# Patient Record
Sex: FEMALE | Race: WHITE | NOT HISPANIC OR LATINO | Employment: UNEMPLOYED | URBAN - METROPOLITAN AREA
[De-identification: names, ages, dates, MRNs, and addresses within clinical notes are randomized per-mention and may not be internally consistent; named-entity substitution may affect disease eponyms.]

---

## 2021-03-09 ENCOUNTER — PATIENT MESSAGE (OUTPATIENT)
Dept: OBSTETRICS AND GYNECOLOGY | Facility: CLINIC | Age: 61
End: 2021-03-09

## 2021-03-09 ENCOUNTER — OFFICE VISIT (OUTPATIENT)
Dept: OBSTETRICS AND GYNECOLOGY | Facility: CLINIC | Age: 61
End: 2021-03-09
Payer: COMMERCIAL

## 2021-03-09 VITALS — WEIGHT: 215.25 LBS | DIASTOLIC BLOOD PRESSURE: 70 MMHG | SYSTOLIC BLOOD PRESSURE: 118 MMHG

## 2021-03-09 DIAGNOSIS — Z12.39 ENCOUNTER FOR SCREENING FOR MALIGNANT NEOPLASM OF BREAST, UNSPECIFIED SCREENING MODALITY: ICD-10-CM

## 2021-03-09 DIAGNOSIS — Z01.419 ENCOUNTER FOR ANNUAL ROUTINE GYNECOLOGICAL EXAMINATION: Primary | ICD-10-CM

## 2021-03-09 PROCEDURE — 99999 PR PBB SHADOW E&M-NEW PATIENT-LVL III: CPT | Mod: PBBFAC,,, | Performed by: NURSE PRACTITIONER

## 2021-03-09 PROCEDURE — 99386 PREV VISIT NEW AGE 40-64: CPT | Mod: S$GLB,,, | Performed by: NURSE PRACTITIONER

## 2021-03-09 PROCEDURE — 1126F PR PAIN SEVERITY QUANTIFIED, NO PAIN PRESENT: ICD-10-PCS | Mod: S$GLB,,, | Performed by: NURSE PRACTITIONER

## 2021-03-09 PROCEDURE — 99386 PR PREVENTIVE VISIT,NEW,40-64: ICD-10-PCS | Mod: S$GLB,,, | Performed by: NURSE PRACTITIONER

## 2021-03-09 PROCEDURE — 99999 PR PBB SHADOW E&M-NEW PATIENT-LVL III: ICD-10-PCS | Mod: PBBFAC,,, | Performed by: NURSE PRACTITIONER

## 2021-03-09 PROCEDURE — 1126F AMNT PAIN NOTED NONE PRSNT: CPT | Mod: S$GLB,,, | Performed by: NURSE PRACTITIONER

## 2021-03-09 RX ORDER — LABETALOL 100 MG/1
TABLET, FILM COATED ORAL
COMMUNITY
Start: 2020-07-15 | End: 2021-10-12

## 2021-03-09 RX ORDER — ALENDRONATE SODIUM 70 MG/1
TABLET ORAL
COMMUNITY
End: 2021-10-12

## 2021-03-09 RX ORDER — HYDROCHLOROTHIAZIDE 25 MG/1
TABLET ORAL
COMMUNITY
Start: 2020-06-10 | End: 2021-10-12 | Stop reason: SDUPTHER

## 2021-03-09 RX ORDER — LOSARTAN POTASSIUM 100 MG/1
TABLET ORAL
COMMUNITY
Start: 2020-07-15 | End: 2022-02-22 | Stop reason: SDUPTHER

## 2021-03-09 RX ORDER — LEVOTHYROXINE SODIUM 200 UG/1
TABLET ORAL
COMMUNITY
Start: 2021-03-01

## 2021-03-09 RX ORDER — VENLAFAXINE HYDROCHLORIDE 150 MG/1
CAPSULE, EXTENDED RELEASE ORAL
COMMUNITY
Start: 2020-08-11 | End: 2022-02-15

## 2021-03-09 RX ORDER — DULOXETIN HYDROCHLORIDE 20 MG/1
20 CAPSULE, DELAYED RELEASE ORAL DAILY
COMMUNITY
Start: 2021-02-27 | End: 2021-10-12

## 2021-03-11 ENCOUNTER — HOSPITAL ENCOUNTER (OUTPATIENT)
Dept: RADIOLOGY | Facility: HOSPITAL | Age: 61
Discharge: HOME | End: 2021-03-11
Attending: NURSE PRACTITIONER
Payer: COMMERCIAL

## 2021-03-11 VITALS — WEIGHT: 215.38 LBS

## 2021-03-11 DIAGNOSIS — Z12.39 ENCOUNTER FOR SCREENING FOR MALIGNANT NEOPLASM OF BREAST, UNSPECIFIED SCREENING MODALITY: ICD-10-CM

## 2021-03-11 PROCEDURE — 77067 SCR MAMMO BI INCL CAD: CPT | Mod: 26,,, | Performed by: RADIOLOGY

## 2021-03-11 PROCEDURE — 77067 MAMMO DIGITAL SCREENING BILAT WITH TOMO: ICD-10-PCS | Mod: 26,,, | Performed by: RADIOLOGY

## 2021-03-11 PROCEDURE — 77063 MAMMO DIGITAL SCREENING BILAT WITH TOMO: ICD-10-PCS | Mod: 26,,, | Performed by: RADIOLOGY

## 2021-03-11 PROCEDURE — 77063 BREAST TOMOSYNTHESIS BI: CPT | Mod: 26,,, | Performed by: RADIOLOGY

## 2021-03-11 PROCEDURE — 77067 SCR MAMMO BI INCL CAD: CPT | Mod: TC,PN

## 2021-10-10 PROBLEM — E03.9 HYPOTHYROIDISM: Status: ACTIVE | Noted: 2021-10-10

## 2021-10-10 PROBLEM — F32.A DEPRESSIVE DISORDER: Status: ACTIVE | Noted: 2020-10-19

## 2021-10-10 PROBLEM — E66.9 OBESITY: Status: ACTIVE | Noted: 2021-10-10

## 2021-10-10 PROBLEM — M81.0 OSTEOPOROSIS: Status: ACTIVE | Noted: 2020-10-19

## 2021-10-10 PROBLEM — M79.7 FIBROMYALGIA: Status: ACTIVE | Noted: 2020-10-19

## 2021-10-10 PROBLEM — I10 HYPERTENSION: Status: ACTIVE | Noted: 2020-10-19

## 2021-10-10 PROBLEM — M19.90 OSTEOARTHRITIS: Status: ACTIVE | Noted: 2020-10-19

## 2021-10-12 ENCOUNTER — OFFICE VISIT (OUTPATIENT)
Dept: PRIMARY CARE CLINIC | Facility: CLINIC | Age: 61
End: 2021-10-12
Payer: COMMERCIAL

## 2021-10-12 VITALS
DIASTOLIC BLOOD PRESSURE: 82 MMHG | OXYGEN SATURATION: 99 % | SYSTOLIC BLOOD PRESSURE: 126 MMHG | HEIGHT: 62 IN | TEMPERATURE: 98 F | BODY MASS INDEX: 40.57 KG/M2 | RESPIRATION RATE: 18 BRPM | WEIGHT: 220.44 LBS | HEART RATE: 92 BPM

## 2021-10-12 DIAGNOSIS — Z11.59 NEED FOR HEPATITIS C SCREENING TEST: ICD-10-CM

## 2021-10-12 DIAGNOSIS — C44.90 SKIN CANCER: ICD-10-CM

## 2021-10-12 DIAGNOSIS — F32.A DEPRESSIVE DISORDER: ICD-10-CM

## 2021-10-12 DIAGNOSIS — M81.0 OSTEOPOROSIS, UNSPECIFIED OSTEOPOROSIS TYPE, UNSPECIFIED PATHOLOGICAL FRACTURE PRESENCE: ICD-10-CM

## 2021-10-12 DIAGNOSIS — E66.01 MORBID OBESITY WITH BMI OF 40.0-44.9, ADULT: ICD-10-CM

## 2021-10-12 DIAGNOSIS — F41.9 ANXIETY: ICD-10-CM

## 2021-10-12 DIAGNOSIS — G60.9 IDIOPATHIC PERIPHERAL NEUROPATHY: ICD-10-CM

## 2021-10-12 DIAGNOSIS — C73 THYROID CANCER: ICD-10-CM

## 2021-10-12 DIAGNOSIS — M79.7 FIBROMYALGIA: ICD-10-CM

## 2021-10-12 DIAGNOSIS — E89.0 POSTOPERATIVE HYPOTHYROIDISM: ICD-10-CM

## 2021-10-12 DIAGNOSIS — N18.30 STAGE 3 CHRONIC KIDNEY DISEASE, UNSPECIFIED WHETHER STAGE 3A OR 3B CKD: ICD-10-CM

## 2021-10-12 DIAGNOSIS — I10 HYPERTENSION, UNSPECIFIED TYPE: Primary | ICD-10-CM

## 2021-10-12 DIAGNOSIS — Z11.4 ENCOUNTER FOR SCREENING FOR HIV: ICD-10-CM

## 2021-10-12 PROBLEM — E55.9 VITAMIN D DEFICIENCY: Status: ACTIVE | Noted: 2021-04-29

## 2021-10-12 PROBLEM — R80.0 ISOLATED NON-NEPHROTIC PROTEINURIA: Status: ACTIVE | Noted: 2021-04-29

## 2021-10-12 PROBLEM — D63.1 ANEMIA IN CHRONIC KIDNEY DISEASE: Status: ACTIVE | Noted: 2021-04-29

## 2021-10-12 PROBLEM — E78.00 HYPERCHOLESTEROLEMIA: Status: ACTIVE | Noted: 2021-10-07

## 2021-10-12 PROBLEM — N18.9 ANEMIA IN CHRONIC KIDNEY DISEASE: Status: ACTIVE | Noted: 2021-04-29

## 2021-10-12 PROCEDURE — 99204 OFFICE O/P NEW MOD 45 MIN: CPT | Mod: S$GLB,,, | Performed by: INTERNAL MEDICINE

## 2021-10-12 PROCEDURE — 3074F PR MOST RECENT SYSTOLIC BLOOD PRESSURE < 130 MM HG: ICD-10-PCS | Mod: CPTII,S$GLB,, | Performed by: INTERNAL MEDICINE

## 2021-10-12 PROCEDURE — 1160F PR REVIEW ALL MEDS BY PRESCRIBER/CLIN PHARMACIST DOCUMENTED: ICD-10-PCS | Mod: CPTII,S$GLB,, | Performed by: INTERNAL MEDICINE

## 2021-10-12 PROCEDURE — 99999 PR PBB SHADOW E&M-EST. PATIENT-LVL IV: CPT | Mod: PBBFAC,,, | Performed by: INTERNAL MEDICINE

## 2021-10-12 PROCEDURE — 4010F PR ACE/ARB THEARPY RXD/TAKEN: ICD-10-PCS | Mod: CPTII,S$GLB,, | Performed by: INTERNAL MEDICINE

## 2021-10-12 PROCEDURE — 4010F ACE/ARB THERAPY RXD/TAKEN: CPT | Mod: CPTII,S$GLB,, | Performed by: INTERNAL MEDICINE

## 2021-10-12 PROCEDURE — 3079F PR MOST RECENT DIASTOLIC BLOOD PRESSURE 80-89 MM HG: ICD-10-PCS | Mod: CPTII,S$GLB,, | Performed by: INTERNAL MEDICINE

## 2021-10-12 PROCEDURE — 3074F SYST BP LT 130 MM HG: CPT | Mod: CPTII,S$GLB,, | Performed by: INTERNAL MEDICINE

## 2021-10-12 PROCEDURE — 3008F BODY MASS INDEX DOCD: CPT | Mod: CPTII,S$GLB,, | Performed by: INTERNAL MEDICINE

## 2021-10-12 PROCEDURE — 99204 PR OFFICE/OUTPT VISIT, NEW, LEVL IV, 45-59 MIN: ICD-10-PCS | Mod: S$GLB,,, | Performed by: INTERNAL MEDICINE

## 2021-10-12 PROCEDURE — 99999 PR PBB SHADOW E&M-EST. PATIENT-LVL IV: ICD-10-PCS | Mod: PBBFAC,,, | Performed by: INTERNAL MEDICINE

## 2021-10-12 PROCEDURE — 3079F DIAST BP 80-89 MM HG: CPT | Mod: CPTII,S$GLB,, | Performed by: INTERNAL MEDICINE

## 2021-10-12 PROCEDURE — 1159F MED LIST DOCD IN RCRD: CPT | Mod: CPTII,S$GLB,, | Performed by: INTERNAL MEDICINE

## 2021-10-12 PROCEDURE — 1160F RVW MEDS BY RX/DR IN RCRD: CPT | Mod: CPTII,S$GLB,, | Performed by: INTERNAL MEDICINE

## 2021-10-12 PROCEDURE — 1159F PR MEDICATION LIST DOCUMENTED IN MEDICAL RECORD: ICD-10-PCS | Mod: CPTII,S$GLB,, | Performed by: INTERNAL MEDICINE

## 2021-10-12 PROCEDURE — 3008F PR BODY MASS INDEX (BMI) DOCUMENTED: ICD-10-PCS | Mod: CPTII,S$GLB,, | Performed by: INTERNAL MEDICINE

## 2021-10-12 RX ORDER — HYDROCHLOROTHIAZIDE 12.5 MG/1
12.5 TABLET ORAL DAILY
Qty: 90 TABLET | Refills: 1 | Status: SHIPPED | OUTPATIENT
Start: 2021-10-12 | End: 2022-02-22 | Stop reason: SDUPTHER

## 2021-10-12 RX ORDER — ACETAMINOPHEN AND CODEINE PHOSPHATE 300; 30 MG/1; MG/1
1 TABLET ORAL
COMMUNITY
Start: 2021-06-17 | End: 2021-10-12

## 2021-10-12 RX ORDER — VENLAFAXINE HYDROCHLORIDE 37.5 MG/1
CAPSULE, EXTENDED RELEASE ORAL
Qty: 30 CAPSULE | Refills: 1 | Status: SHIPPED | OUTPATIENT
Start: 2021-10-12 | End: 2021-12-06

## 2021-10-12 RX ORDER — DICLOXACILLIN SODIUM 500 MG/1
500 CAPSULE ORAL 2 TIMES DAILY
COMMUNITY
Start: 2021-06-18 | End: 2021-10-12

## 2021-10-12 RX ORDER — PREGABALIN 25 MG/1
25 CAPSULE ORAL
COMMUNITY
Start: 2021-10-08 | End: 2023-03-01

## 2021-10-12 RX ORDER — METOPROLOL SUCCINATE 50 MG/1
50 TABLET, EXTENDED RELEASE ORAL DAILY
COMMUNITY
Start: 2021-09-22 | End: 2022-02-22 | Stop reason: SDUPTHER

## 2021-11-23 ENCOUNTER — OFFICE VISIT (OUTPATIENT)
Dept: URGENT CARE | Facility: CLINIC | Age: 61
End: 2021-11-23
Payer: COMMERCIAL

## 2021-11-23 VITALS
TEMPERATURE: 98 F | SYSTOLIC BLOOD PRESSURE: 156 MMHG | OXYGEN SATURATION: 98 % | RESPIRATION RATE: 18 BRPM | WEIGHT: 215 LBS | BODY MASS INDEX: 39.56 KG/M2 | HEART RATE: 74 BPM | HEIGHT: 62 IN | DIASTOLIC BLOOD PRESSURE: 101 MMHG

## 2021-11-23 DIAGNOSIS — C73 THYROID CANCER: ICD-10-CM

## 2021-11-23 DIAGNOSIS — N18.31 STAGE 3A CHRONIC KIDNEY DISEASE: ICD-10-CM

## 2021-11-23 DIAGNOSIS — E66.9 OBESITY, UNSPECIFIED CLASSIFICATION, UNSPECIFIED OBESITY TYPE, UNSPECIFIED WHETHER SERIOUS COMORBIDITY PRESENT: ICD-10-CM

## 2021-11-23 DIAGNOSIS — S49.92XA INJURY OF LEFT SHOULDER, INITIAL ENCOUNTER: Primary | ICD-10-CM

## 2021-11-23 PROCEDURE — 4010F ACE/ARB THERAPY RXD/TAKEN: CPT | Mod: CPTII,S$GLB,, | Performed by: SURGERY

## 2021-11-23 PROCEDURE — 73030 X-RAY EXAM OF SHOULDER: CPT | Mod: LT,S$GLB,, | Performed by: RADIOLOGY

## 2021-11-23 PROCEDURE — 99214 PR OFFICE/OUTPT VISIT, EST, LEVL IV, 30-39 MIN: ICD-10-PCS | Mod: 25,S$GLB,, | Performed by: SURGERY

## 2021-11-23 PROCEDURE — 4010F PR ACE/ARB THEARPY RXD/TAKEN: ICD-10-PCS | Mod: CPTII,S$GLB,, | Performed by: SURGERY

## 2021-11-23 PROCEDURE — 99214 OFFICE O/P EST MOD 30 MIN: CPT | Mod: 25,S$GLB,, | Performed by: SURGERY

## 2021-11-23 PROCEDURE — 96372 THER/PROPH/DIAG INJ SC/IM: CPT | Mod: S$GLB,,, | Performed by: SURGERY

## 2021-11-23 PROCEDURE — 96372 PR INJECTION,THERAP/PROPH/DIAG2ST, IM OR SUBCUT: ICD-10-PCS | Mod: S$GLB,,, | Performed by: SURGERY

## 2021-11-23 PROCEDURE — 73030 XR SHOULDER COMPLETE 2 OR MORE VIEWS LEFT: ICD-10-PCS | Mod: LT,S$GLB,, | Performed by: RADIOLOGY

## 2021-11-23 RX ORDER — BETAMETHASONE SODIUM PHOSPHATE AND BETAMETHASONE ACETATE 3; 3 MG/ML; MG/ML
6 INJECTION, SUSPENSION INTRA-ARTICULAR; INTRALESIONAL; INTRAMUSCULAR; SOFT TISSUE
Status: COMPLETED | OUTPATIENT
Start: 2021-11-23 | End: 2021-11-23

## 2021-11-23 RX ORDER — TIZANIDINE 4 MG/1
4 TABLET ORAL 3 TIMES DAILY PRN
Qty: 20 TABLET | Refills: 0 | Status: SHIPPED | OUTPATIENT
Start: 2021-11-23 | End: 2022-02-22

## 2021-11-23 RX ADMIN — BETAMETHASONE SODIUM PHOSPHATE AND BETAMETHASONE ACETATE 6 MG: 3; 3 INJECTION, SUSPENSION INTRA-ARTICULAR; INTRALESIONAL; INTRAMUSCULAR; SOFT TISSUE at 08:11

## 2021-11-29 ENCOUNTER — OFFICE VISIT (OUTPATIENT)
Dept: SPORTS MEDICINE | Facility: CLINIC | Age: 61
End: 2021-11-29
Payer: COMMERCIAL

## 2021-11-29 VITALS
HEIGHT: 62 IN | WEIGHT: 222.88 LBS | DIASTOLIC BLOOD PRESSURE: 103 MMHG | HEART RATE: 96 BPM | BODY MASS INDEX: 41.02 KG/M2 | SYSTOLIC BLOOD PRESSURE: 153 MMHG

## 2021-11-29 DIAGNOSIS — M75.22 BICEPS TENDINITIS OF LEFT SHOULDER: ICD-10-CM
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
## 2021-11-29 DIAGNOSIS — M25.512 ACUTE PAIN OF LEFT SHOULDER: Primary | ICD-10-CM

## 2021-11-29 PROCEDURE — 99204 PR OFFICE/OUTPT VISIT, NEW, LEVL IV, 45-59 MIN: ICD-10-PCS | Mod: S$GLB,,, | Performed by: PHYSICIAN ASSISTANT

## 2021-11-29 PROCEDURE — 99204 OFFICE O/P NEW MOD 45 MIN: CPT | Mod: S$GLB,,, | Performed by: PHYSICIAN ASSISTANT

## 2021-11-29 PROCEDURE — 4010F PR ACE/ARB THEARPY RXD/TAKEN: ICD-10-PCS | Mod: CPTII,S$GLB,, | Performed by: PHYSICIAN ASSISTANT

## 2021-11-29 PROCEDURE — 99999 PR PBB SHADOW E&M-EST. PATIENT-LVL III: CPT | Mod: PBBFAC,,, | Performed by: PHYSICIAN ASSISTANT

## 2021-11-29 PROCEDURE — 4010F ACE/ARB THERAPY RXD/TAKEN: CPT | Mod: CPTII,S$GLB,, | Performed by: PHYSICIAN ASSISTANT

## 2021-11-29 PROCEDURE — 99999 PR PBB SHADOW E&M-EST. PATIENT-LVL III: ICD-10-PCS | Mod: PBBFAC,,, | Performed by: PHYSICIAN ASSISTANT

## 2021-11-29 RX ORDER — METHOCARBAMOL 500 MG/1
500 TABLET, FILM COATED ORAL 4 TIMES DAILY
Qty: 40 TABLET | Refills: 0 | Status: SHIPPED | OUTPATIENT
Start: 2021-11-29 | End: 2021-12-09

## 2021-12-08 LAB
HCV AB S/CO SERPL IA: 0.01
HCV AB SERPL QL IA: NORMAL
HIV 1+2 AB+HIV1 P24 AG SERPL QL IA: NORMAL

## 2021-12-13 ENCOUNTER — PATIENT MESSAGE (OUTPATIENT)
Dept: PRIMARY CARE CLINIC | Facility: CLINIC | Age: 61
End: 2021-12-13
Payer: COMMERCIAL

## 2021-12-13 RX ORDER — DAPAGLIFLOZIN 10 MG/1
10 TABLET, FILM COATED ORAL DAILY
Qty: 1 TABLET | Refills: 0
Start: 2021-12-13 | End: 2022-02-22

## 2021-12-17 ENCOUNTER — OFFICE VISIT (OUTPATIENT)
Dept: SPORTS MEDICINE | Facility: CLINIC | Age: 61
End: 2021-12-17
Payer: COMMERCIAL

## 2021-12-17 VITALS
SYSTOLIC BLOOD PRESSURE: 164 MMHG | DIASTOLIC BLOOD PRESSURE: 94 MMHG | WEIGHT: 222.25 LBS | HEIGHT: 62 IN | HEART RATE: 96 BPM | BODY MASS INDEX: 40.9 KG/M2

## 2021-12-17 DIAGNOSIS — M25.512 ACUTE PAIN OF LEFT SHOULDER: Primary | ICD-10-CM

## 2021-12-17 PROCEDURE — 4010F ACE/ARB THERAPY RXD/TAKEN: CPT | Mod: CPTII,S$GLB,, | Performed by: PHYSICIAN ASSISTANT

## 2021-12-17 PROCEDURE — 99999 PR PBB SHADOW E&M-EST. PATIENT-LVL III: ICD-10-PCS | Mod: PBBFAC,,, | Performed by: PHYSICIAN ASSISTANT

## 2021-12-17 PROCEDURE — 99213 PR OFFICE/OUTPT VISIT, EST, LEVL III, 20-29 MIN: ICD-10-PCS | Mod: S$GLB,,, | Performed by: PHYSICIAN ASSISTANT

## 2021-12-17 PROCEDURE — 99999 PR PBB SHADOW E&M-EST. PATIENT-LVL III: CPT | Mod: PBBFAC,,, | Performed by: PHYSICIAN ASSISTANT

## 2021-12-17 PROCEDURE — 99213 OFFICE O/P EST LOW 20 MIN: CPT | Mod: S$GLB,,, | Performed by: PHYSICIAN ASSISTANT

## 2021-12-17 PROCEDURE — 4010F PR ACE/ARB THEARPY RXD/TAKEN: ICD-10-PCS | Mod: CPTII,S$GLB,, | Performed by: PHYSICIAN ASSISTANT

## 2021-12-21 ENCOUNTER — PATIENT OUTREACH (OUTPATIENT)
Dept: ADMINISTRATIVE | Facility: HOSPITAL | Age: 61
End: 2021-12-21
Payer: COMMERCIAL

## 2022-01-07 ENCOUNTER — PATIENT MESSAGE (OUTPATIENT)
Dept: PRIMARY CARE CLINIC | Facility: CLINIC | Age: 62
End: 2022-01-07
Payer: COMMERCIAL

## 2022-01-10 NOTE — TELEPHONE ENCOUNTER
Pt was in contact with a covid positive person on 1/6/22. She states she started with sore throat and a cough on Saturday 1/8/22 and received a negative covid test.

## 2022-01-25 ENCOUNTER — PATIENT MESSAGE (OUTPATIENT)
Dept: PRIMARY CARE CLINIC | Facility: CLINIC | Age: 62
End: 2022-01-25
Payer: COMMERCIAL

## 2022-01-25 DIAGNOSIS — R05.9 COUGH: Primary | ICD-10-CM

## 2022-01-25 RX ORDER — CODEINE PHOSPHATE AND GUAIFENESIN 10; 100 MG/5ML; MG/5ML
5 SOLUTION ORAL EVERY 8 HOURS PRN
Qty: 105 ML | Refills: 0 | Status: SHIPPED | OUTPATIENT
Start: 2022-01-25 | End: 2022-02-04

## 2022-01-25 NOTE — TELEPHONE ENCOUNTER
Will send her cough syrup with codeine.  The Rx says she can take it up to Q8hrs but she may want to take just at bedtime as it may make her tired and a little loopy due to the codeine.  If this does not help or she worsens she should let us know and she may need a CXR and examination.    Dr. ROGERS

## 2022-02-08 ENCOUNTER — PATIENT OUTREACH (OUTPATIENT)
Dept: ADMINISTRATIVE | Facility: HOSPITAL | Age: 62
End: 2022-02-08
Payer: COMMERCIAL

## 2022-02-08 NOTE — PROGRESS NOTES
Patient due for the following    Pneumococcal Vaccines (Age 0-64) (2 of 4 - PPSV23)    Mammogram       Received c-scope records.  Scanned to media.  updated    Mammogram due 3/2022      Immunizations: reviewed and updated  Care Everywhere: triggered  Care Teams:  Up to date  Outreach: none needed

## 2022-02-20 NOTE — PROGRESS NOTES
[facility] Note    Chief Complaint      Chief Complaint   Patient presents with    Follow-up       History of Present Illness      Bree Gonzales is a 61 y.o.  F with HTN, Hypothyroidism, Depression, Anxiety, FM, OA, and Osteoporosis, Anemia of CKD III, HLD, Vit D def, FM, Peripheral neuropathy, Morbid Obesity, and fam h/o Breast Ca presents to fu chronic issues. Last visit - 10/12/21.    H/o Melanoma - Fu by Derm. Doing well. Had resection of lesion to Left cheek 6/17/21     HTN - Pt controlled on HCTZ 12.5 mg/d, Toprol 50 mg/d, and Losartan 100 mg/d     Hypothyroidism - Pt on Brand Name Synthroid 200 mcg/d. Pt is s/p Total Thyroidectomy due to thyroid Ca. Fu by Dr. Iam Haile.      Depression - Pt on Venlafaxine  mg +37.5 mg/d.     Anxiety -  Pt on Venlafaxine  mg +37.5 mg/d. Her mom has dementia.      Anemia of CKD III - Fu by Dr. Sarmiento.        CKD III -  Fu by Dr. Sarmiento.  Pt could not afford Farxiga 10 mg/d.      HLD - Pt not on pharmacotherapy.        Vit D def - Pt is on a MVI and Ca+D.      OA - Fu by Rheum, Dr. Grimes.      Osteoporosis - Pt on Prolia. Last inj was in Dec.     FM - Pt on Lyrica 25 mg QAm and 2 tabs QHS.       Peripheral neuropathy - Foot pain - She c/o numbness and throbbing at night.   Cymbalta 20 mg/d no help. Pt is on Lyrica 25 mg QAm and 2 tabs QHS.     Morbid Obesity - BMI -41.65  Up 7 lb. She is in the process of moving. Pt reports a recent normal HA1c. Pt tries to limit carbs. She does not exercise.  Rec goal 150 min/wk.     fam h/o Breast Ca - Pt is BRCA neg.      H/o Malignant Hyperthermia -s/p anesthesia    Lab review: 12/6/21 - BUN/Cr - 33/1.10; H/H - 12/37.4.     HCM - Flu - 10/12/21;  Tdap - 5/2/14;  PCV 13 - none;  PVX 23 - none;  Shingrix - 8/26/20 and #2 - 11/11/21;  Zostavax - 2/24/15; COVID - 19 Vaccine (Pfiizer)  #1 2/26/21; #2 3/19/21; #3 - 10/26/21; MGM - 3/11/21 - repeat 1 yr;  DEXA - 11/2020 - +Osteoporosis;  PAP - ?; Hep C  Screen - 12/6/21 - neg. ;  HIV Screen - 12/6/21 - neg. ; C-scope - '11- due;   Prev PCP - Dr. Duckworth;  Rheum - Dr. Grimes; Renal - Dr. Sarmiento; Derm - [facility]; Hand Sx - Dr. Quiroz; Ob/GYN - NP - Quiana Sotomayor; Endo - Dr. Vitale; Ophtho - Dr. Kennedy; GI - Dr. Bansal       Patient Care Team:  Shana Jade MD as PCP - General (Internal Medicine)  Amber Sotomayor MA as Care Coordinator  Praneeth Bansal MD as Consulting Physician (Gastroenterology)     Health Maintenance:  Immunization History   Administered Date(s) Administered    COVID-19, MRNA, LN-S, PF (Pfizer) (Purple Cap) 02/26/2021, 03/19/2021, 10/26/2021    Influenza - Quadrivalent 10/03/2019    Influenza - Quadrivalent - MDCK - PF 08/26/2020    Influenza - Quadrivalent - PF *Preferred* (6 months and older) 10/03/2018, 10/12/2021    Pneumococcal Polysaccharide - 23 Valent 01/25/2017    Tdap 04/30/2014, 05/02/2014    Zoster 02/24/2015    Zoster Recombinant 08/26/2020, 11/11/2021      Health Maintenance   Topic Date Due    Mammogram  03/11/2022    TETANUS VACCINE  05/02/2024    Lipid Panel  12/29/2025    Hepatitis C Screening  Completed        Past Medical History:  Past Medical History:   Diagnosis Date    CKD (chronic kidney disease), stage III 10/12/2021    Family history of breast cancer     Fibromyalgia     Hypertension     Hypothyroidism     Malignant hyperthermia due to anesthesia     Osteoporosis     Skin cancer 10/12/2021    Thyroid cancer 10/12/2021       Past Surgical History:   has a past surgical history that includes Cholecystectomy (05/1981); Total thyroidectomy (03/2002); Skin cancer excision; Dilation and curettage of uterus; Trigger finger release (Right, 11/2020); and Cataract extraction, bilateral.    Family History:  family history includes Breast cancer in her maternal grandmother; Breast cancer (age of onset: 51) in her sister; Breast cancer (age of onset: 74) in her mother; Dementia in her mother.      Social History:  Social History     Tobacco Use    Smoking status: Never Smoker    Smokeless tobacco: Never Used   Substance Use Topics    Alcohol use: Never    Drug use: Never       Review of Systems   Constitutional: Negative for chills, diaphoresis and fever.   HENT: Positive for nasal congestion and postnasal drip.    Eyes: Negative for visual disturbance.   Respiratory: Negative for chest tightness and shortness of breath.    Cardiovascular: Negative for chest pain and palpitations.   Gastrointestinal: Negative for abdominal pain, constipation, diarrhea, nausea and vomiting.   Genitourinary: Negative for dysuria and frequency.   Musculoskeletal: Positive for arthralgias and myalgias.        Low back - took tylenol this Am.  She has been packing and moving boxes.    Neurological: Negative for dizziness and headaches.   Psychiatric/Behavioral: Negative for dysphoric mood. The patient is not nervous/anxious.         Stress with moving        Medications:    Current Outpatient Medications:     cetirizine 10 mg Cap, Take by mouth., Disp: , Rfl:     denosumab (PROLIA) 60 mg/mL Syrg, Inject 1 mL (60 mg total) into the skin every 6 (six) months., Disp: , Rfl:     pregabalin (LYRICA) 25 MG capsule, Take 25 mg by mouth 3 (three) times daily with meals., Disp: , Rfl:     SYNTHROID 200 mcg tablet, TAKE 1 TABLET BY MOUTH ON MONDAY THRU SATURDAY AND 2 TABLETS ON SUNDAY, Disp: , Rfl:     hydroCHLOROthiazide (HYDRODIURIL) 12.5 MG Tab, Take 1 tablet (12.5 mg total) by mouth once daily., Disp: 90 tablet, Rfl: 1    losartan (COZAAR) 100 MG tablet, 1 po daily, Disp: 90 tablet, Rfl: 1    metoprolol succinate (TOPROL-XL) 50 MG 24 hr tablet, Take 1 tablet (50 mg total) by mouth once daily., Disp: 90 tablet, Rfl: 1    venlafaxine (EFFEXOR-XR) 150 MG Cp24, Take 1 capsule (150 mg total) by mouth every evening., Disp: 30 capsule, Rfl: 5    venlafaxine (EFFEXOR-XR) 37.5 MG 24 hr capsule, TAKE 1 CAPSULE BY MOUTH EVERY DAY  "ALONG WITH 150MG CAPSULES, Disp: 30 capsule, Rfl: 5     Allergies:  Review of patient's allergies indicates:   Allergen Reactions    Nsaids (non-steroidal anti-inflammatory drug)     Neosporin (neomycin-polymyx) Rash       Physical Exam      Vital Signs  Temp: 98 °F (36.7 °C)  Pulse: 89  Resp: 18  SpO2: 97 %  BP: 127/64  BP Location: Left arm  Patient Position: Sitting  Pain Score:   4  Height and Weight  Height: 5' 2" (157.5 cm)  Weight: 103.3 kg (227 lb 11.8 oz)  BSA (Calculated - sq m): 2.13 sq meters  BMI (Calculated): 41.6  Weight in (lb) to have BMI = 25: 136.4      Patient Position: Sitting      Physical Exam  Vitals reviewed.   Constitutional:       General: She is not in acute distress.     Appearance: Normal appearance. She is not ill-appearing, toxic-appearing or diaphoretic.   HENT:      Head: Normocephalic and atraumatic.      Ears:      Comments: RT serous otitis  Eyes:      Extraocular Movements: Extraocular movements intact.      Conjunctiva/sclera: Conjunctivae normal.      Pupils: Pupils are equal, round, and reactive to light.   Neck:      Vascular: No carotid bruit.   Cardiovascular:      Rate and Rhythm: Normal rate and regular rhythm.      Pulses: Normal pulses.      Heart sounds: Normal heart sounds. No murmur heard.  Pulmonary:      Effort: No respiratory distress.      Breath sounds: Normal breath sounds. No wheezing.   Abdominal:      General: Bowel sounds are normal. There is no distension.      Palpations: Abdomen is soft.      Tenderness: There is no abdominal tenderness. There is no guarding or rebound.   Musculoskeletal:         General: Normal range of motion.   Skin:     General: Skin is warm and dry.   Neurological:      General: No focal deficit present.      Mental Status: She is alert and oriented to person, place, and time.   Psychiatric:         Mood and Affect: Mood normal.         Behavior: Behavior normal.          Laboratory:        Recent Labs   Lab 12/06/21  0922 "   Hepatitis C Ab NON-REACTIVE       Assessment/Plan     Bree Gonzales is a 61 y.o.female with:    Normal physical exam, routine  - Performed today.  Will check Basic labs.  RTC in 1 yr for fu or sooner if needed    Hypertension, unspecified type  -     hydroCHLOROthiazide (HYDRODIURIL) 12.5 MG Tab; Take 1 tablet (12.5 mg total) by mouth once daily.  Dispense: 90 tablet; Refill: 1  -     losartan (COZAAR) 100 MG tablet; 1 po daily  Dispense: 90 tablet; Refill: 1  -     metoprolol succinate (TOPROL-XL) 50 MG 24 hr tablet; Take 1 tablet (50 mg total) by mouth once daily.  Dispense: 90 tablet; Refill: 1  - Controlled.  Cont current.     Postoperative hypothyroidism  - Controlled.  Cont current. Managed by Dr. Vitale.     Stage 3 chronic kidney disease, unspecified whether stage 3a or 3b CKD  - Stable.  Cont current regimen.    Depressive disorder  -     venlafaxine (EFFEXOR-XR) 150 MG Cp24; Take 1 capsule (150 mg total) by mouth every evening.  Dispense: 30 capsule; Refill: 5  -     venlafaxine (EFFEXOR-XR) 37.5 MG 24 hr capsule; TAKE 1 CAPSULE BY MOUTH EVERY DAY ALONG WITH 150MG CAPSULES  Dispense: 30 capsule; Refill: 5  - Stable.  Cont current regimen.    Anxiety  -     venlafaxine (EFFEXOR-XR) 37.5 MG 24 hr capsule; TAKE 1 CAPSULE BY MOUTH EVERY DAY ALONG WITH 150MG CAPSULES  Dispense: 30 capsule; Refill: 5  - Stable.  Cont current regimen.    Fibromyalgia  - Stable.  Cont current regimen.    Osteoporosis, unspecified osteoporosis type, unspecified pathological fracture presence  - Stable. Pt on Prolia. Last inj was in Dec.    Morbid obesity with BMI of 40.0-44.9, adult  - Rec diet and exercise as discussed for wt loss.      Hypercholesterolemia  -     Lipid Panel; Future; Expected date: 02/22/2022  -Repeat FLP. Pt not on pharmacotherapy.     Acute rhinitis  - Rec autoinsufflation exercises prn.  Flonase 2 SEN/d, Claritin daily prn. Thsi will likely help with postnasal drip, congestion, and serous otitis.           Chronic conditions status updated as per HPI.  Other than changes above, cont current medications and maintain follow up with specialists.  Follow up in about 6 months (around 8/22/2022) for fu chronic issues or sooner if needed.      Shana Jade MD  [facility]

## 2022-02-22 ENCOUNTER — OFFICE VISIT (OUTPATIENT)
Dept: PRIMARY CARE CLINIC | Facility: CLINIC | Age: 62
End: 2022-02-22
Payer: COMMERCIAL

## 2022-02-22 VITALS
DIASTOLIC BLOOD PRESSURE: 64 MMHG | RESPIRATION RATE: 18 BRPM | OXYGEN SATURATION: 97 % | BODY MASS INDEX: 41.91 KG/M2 | HEIGHT: 62 IN | TEMPERATURE: 98 F | SYSTOLIC BLOOD PRESSURE: 127 MMHG | WEIGHT: 227.75 LBS | HEART RATE: 89 BPM

## 2022-02-22 DIAGNOSIS — N18.30 STAGE 3 CHRONIC KIDNEY DISEASE, UNSPECIFIED WHETHER STAGE 3A OR 3B CKD: ICD-10-CM

## 2022-02-22 DIAGNOSIS — E66.01 MORBID OBESITY WITH BMI OF 40.0-44.9, ADULT: ICD-10-CM

## 2022-02-22 DIAGNOSIS — M81.0 OSTEOPOROSIS, UNSPECIFIED OSTEOPOROSIS TYPE, UNSPECIFIED PATHOLOGICAL FRACTURE PRESENCE: ICD-10-CM

## 2022-02-22 DIAGNOSIS — F41.9 ANXIETY: ICD-10-CM

## 2022-02-22 DIAGNOSIS — F32.A DEPRESSIVE DISORDER: ICD-10-CM

## 2022-02-22 DIAGNOSIS — M79.7 FIBROMYALGIA: ICD-10-CM

## 2022-02-22 DIAGNOSIS — J00 ACUTE RHINITIS: ICD-10-CM

## 2022-02-22 DIAGNOSIS — E78.00 HYPERCHOLESTEROLEMIA: ICD-10-CM

## 2022-02-22 DIAGNOSIS — Z00.00 NORMAL PHYSICAL EXAM, ROUTINE: Primary | ICD-10-CM

## 2022-02-22 DIAGNOSIS — I10 HYPERTENSION, UNSPECIFIED TYPE: ICD-10-CM

## 2022-02-22 DIAGNOSIS — E89.0 POSTOPERATIVE HYPOTHYROIDISM: ICD-10-CM

## 2022-02-22 PROCEDURE — 4010F ACE/ARB THERAPY RXD/TAKEN: CPT | Mod: CPTII,S$GLB,, | Performed by: INTERNAL MEDICINE

## 2022-02-22 PROCEDURE — 99396 PREV VISIT EST AGE 40-64: CPT | Mod: S$GLB,,, | Performed by: INTERNAL MEDICINE

## 2022-02-22 PROCEDURE — 1159F MED LIST DOCD IN RCRD: CPT | Mod: CPTII,S$GLB,, | Performed by: INTERNAL MEDICINE

## 2022-02-22 PROCEDURE — 3008F PR BODY MASS INDEX (BMI) DOCUMENTED: ICD-10-PCS | Mod: CPTII,S$GLB,, | Performed by: INTERNAL MEDICINE

## 2022-02-22 PROCEDURE — 1159F PR MEDICATION LIST DOCUMENTED IN MEDICAL RECORD: ICD-10-PCS | Mod: CPTII,S$GLB,, | Performed by: INTERNAL MEDICINE

## 2022-02-22 PROCEDURE — 3078F DIAST BP <80 MM HG: CPT | Mod: CPTII,S$GLB,, | Performed by: INTERNAL MEDICINE

## 2022-02-22 PROCEDURE — 4010F PR ACE/ARB THEARPY RXD/TAKEN: ICD-10-PCS | Mod: CPTII,S$GLB,, | Performed by: INTERNAL MEDICINE

## 2022-02-22 PROCEDURE — 99999 PR PBB SHADOW E&M-EST. PATIENT-LVL IV: CPT | Mod: PBBFAC,,, | Performed by: INTERNAL MEDICINE

## 2022-02-22 PROCEDURE — 99396 PR PREVENTIVE VISIT,EST,40-64: ICD-10-PCS | Mod: S$GLB,,, | Performed by: INTERNAL MEDICINE

## 2022-02-22 PROCEDURE — 3008F BODY MASS INDEX DOCD: CPT | Mod: CPTII,S$GLB,, | Performed by: INTERNAL MEDICINE

## 2022-02-22 PROCEDURE — 3078F PR MOST RECENT DIASTOLIC BLOOD PRESSURE < 80 MM HG: ICD-10-PCS | Mod: CPTII,S$GLB,, | Performed by: INTERNAL MEDICINE

## 2022-02-22 PROCEDURE — 3074F SYST BP LT 130 MM HG: CPT | Mod: CPTII,S$GLB,, | Performed by: INTERNAL MEDICINE

## 2022-02-22 PROCEDURE — 99999 PR PBB SHADOW E&M-EST. PATIENT-LVL IV: ICD-10-PCS | Mod: PBBFAC,,, | Performed by: INTERNAL MEDICINE

## 2022-02-22 PROCEDURE — 3074F PR MOST RECENT SYSTOLIC BLOOD PRESSURE < 130 MM HG: ICD-10-PCS | Mod: CPTII,S$GLB,, | Performed by: INTERNAL MEDICINE

## 2022-02-22 RX ORDER — HYDROCHLOROTHIAZIDE 12.5 MG/1
12.5 TABLET ORAL DAILY
Qty: 90 TABLET | Refills: 1 | Status: SHIPPED | OUTPATIENT
Start: 2022-02-22 | End: 2022-07-26

## 2022-02-22 RX ORDER — METOPROLOL SUCCINATE 50 MG/1
50 TABLET, EXTENDED RELEASE ORAL DAILY
Qty: 90 TABLET | Refills: 1 | Status: SHIPPED | OUTPATIENT
Start: 2022-02-22 | End: 2022-08-23 | Stop reason: SDUPTHER

## 2022-02-22 RX ORDER — VENLAFAXINE HYDROCHLORIDE 37.5 MG/1
CAPSULE, EXTENDED RELEASE ORAL
Qty: 30 CAPSULE | Refills: 5 | Status: SHIPPED | OUTPATIENT
Start: 2022-02-22 | End: 2022-08-23 | Stop reason: SDUPTHER

## 2022-02-22 RX ORDER — VENLAFAXINE HYDROCHLORIDE 150 MG/1
150 CAPSULE, EXTENDED RELEASE ORAL NIGHTLY
Qty: 30 CAPSULE | Refills: 5 | Status: SHIPPED | OUTPATIENT
Start: 2022-02-22 | End: 2022-08-23 | Stop reason: SDUPTHER

## 2022-02-22 RX ORDER — LOSARTAN POTASSIUM 100 MG/1
TABLET ORAL
Qty: 90 TABLET | Refills: 1 | Status: SHIPPED | OUTPATIENT
Start: 2022-02-22 | End: 2022-08-23 | Stop reason: SDUPTHER

## 2022-02-23 ENCOUNTER — PATIENT MESSAGE (OUTPATIENT)
Dept: PRIMARY CARE CLINIC | Facility: CLINIC | Age: 62
End: 2022-02-23
Payer: COMMERCIAL

## 2022-02-23 LAB
CHOLEST SERPL-MCNC: 240 MG/DL
CHOLEST/HDLC SERPL: 4.2 (CALC)
HDLC SERPL-MCNC: 57 MG/DL
LDLC SERPL CALC-MCNC: 148 MG/DL (CALC)
NONHDLC SERPL-MCNC: 183 MG/DL (CALC)
TRIGL SERPL-MCNC: 210 MG/DL

## 2022-02-23 NOTE — PROGRESS NOTES
I sent pt a my chart message -  I reviewed your labs.  Your cholesterol was high. The 10-year ASCVD risk score (Risk of having a cardiovascular event within 10 yrs) is: 4.9%.  Given that you have chronic kidney disease Stage III I recommend you follow a low cholesterol diet and exercise.  You can visit the American heart association website at heart.org for further info on a low cholesterol diet.   I recommend you consider starting a statin.  I would like to start you on Atorvastatin 20 mg daily. There are potential Side effects including muscle pain,  muscle weakness, and hyperglycemia.  Please let me know if you are willing to start this so I can send it to the pharm for you . You should call with any concerns.  Will cont to monitor. No further recommendations at this time.    Dr. ROGERS

## 2022-02-24 RX ORDER — ATORVASTATIN CALCIUM 20 MG/1
20 TABLET, FILM COATED ORAL DAILY
Qty: 90 TABLET | Refills: 1 | Status: SHIPPED | OUTPATIENT
Start: 2022-02-24 | End: 2022-07-28

## 2022-02-24 NOTE — TELEPHONE ENCOUNTER
No new care gaps identified.  Powered by [email or web address] by [employer]. Reference number: [id number].   2/24/2022 8:03:59 AM CST

## 2022-04-18 ENCOUNTER — OFFICE VISIT (OUTPATIENT)
Dept: OBSTETRICS AND GYNECOLOGY | Facility: CLINIC | Age: 62
End: 2022-04-18
Payer: COMMERCIAL

## 2022-04-18 ENCOUNTER — HOSPITAL ENCOUNTER (OUTPATIENT)
Dept: RADIOLOGY | Facility: HOSPITAL | Age: 62
Discharge: HOME | End: 2022-04-18
Attending: OBSTETRICS & GYNECOLOGY
Payer: COMMERCIAL

## 2022-04-18 VITALS
SYSTOLIC BLOOD PRESSURE: 120 MMHG | HEIGHT: 62 IN | WEIGHT: 230.19 LBS | BODY MASS INDEX: 42.36 KG/M2 | DIASTOLIC BLOOD PRESSURE: 78 MMHG

## 2022-04-18 VITALS — HEIGHT: 62 IN | WEIGHT: 229 LBS | BODY MASS INDEX: 42.14 KG/M2

## 2022-04-18 DIAGNOSIS — Z12.31 SCREENING MAMMOGRAM FOR BREAST CANCER: ICD-10-CM

## 2022-04-18 DIAGNOSIS — Z01.419 ENCOUNTER FOR WELL WOMAN EXAM WITH ROUTINE GYNECOLOGICAL EXAM: Primary | ICD-10-CM

## 2022-04-18 PROCEDURE — 77067 SCR MAMMO BI INCL CAD: CPT | Mod: 26,,, | Performed by: RADIOLOGY

## 2022-04-18 PROCEDURE — 4010F PR ACE/ARB THEARPY RXD/TAKEN: ICD-10-PCS | Mod: CPTII,S$GLB,, | Performed by: OBSTETRICS & GYNECOLOGY

## 2022-04-18 PROCEDURE — 77063 BREAST TOMOSYNTHESIS BI: CPT | Mod: TC,PN

## 2022-04-18 PROCEDURE — 3078F DIAST BP <80 MM HG: CPT | Mod: CPTII,S$GLB,, | Performed by: OBSTETRICS & GYNECOLOGY

## 2022-04-18 PROCEDURE — 3008F PR BODY MASS INDEX (BMI) DOCUMENTED: ICD-10-PCS | Mod: CPTII,S$GLB,, | Performed by: OBSTETRICS & GYNECOLOGY

## 2022-04-18 PROCEDURE — 3078F PR MOST RECENT DIASTOLIC BLOOD PRESSURE < 80 MM HG: ICD-10-PCS | Mod: CPTII,S$GLB,, | Performed by: OBSTETRICS & GYNECOLOGY

## 2022-04-18 PROCEDURE — 99396 PR PREVENTIVE VISIT,EST,40-64: ICD-10-PCS | Mod: S$GLB,,, | Performed by: OBSTETRICS & GYNECOLOGY

## 2022-04-18 PROCEDURE — 1159F PR MEDICATION LIST DOCUMENTED IN MEDICAL RECORD: ICD-10-PCS | Mod: CPTII,S$GLB,, | Performed by: OBSTETRICS & GYNECOLOGY

## 2022-04-18 PROCEDURE — 77067 MAMMO DIGITAL SCREENING BILAT WITH TOMO: ICD-10-PCS | Mod: 26,,, | Performed by: RADIOLOGY

## 2022-04-18 PROCEDURE — 3008F BODY MASS INDEX DOCD: CPT | Mod: CPTII,S$GLB,, | Performed by: OBSTETRICS & GYNECOLOGY

## 2022-04-18 PROCEDURE — 1159F MED LIST DOCD IN RCRD: CPT | Mod: CPTII,S$GLB,, | Performed by: OBSTETRICS & GYNECOLOGY

## 2022-04-18 PROCEDURE — 3074F SYST BP LT 130 MM HG: CPT | Mod: CPTII,S$GLB,, | Performed by: OBSTETRICS & GYNECOLOGY

## 2022-04-18 PROCEDURE — 4010F ACE/ARB THERAPY RXD/TAKEN: CPT | Mod: CPTII,S$GLB,, | Performed by: OBSTETRICS & GYNECOLOGY

## 2022-04-18 PROCEDURE — 99999 PR PBB SHADOW E&M-EST. PATIENT-LVL III: CPT | Mod: PBBFAC,,, | Performed by: OBSTETRICS & GYNECOLOGY

## 2022-04-18 PROCEDURE — 77063 BREAST TOMOSYNTHESIS BI: CPT | Mod: 26,,, | Performed by: RADIOLOGY

## 2022-04-18 PROCEDURE — 3074F PR MOST RECENT SYSTOLIC BLOOD PRESSURE < 130 MM HG: ICD-10-PCS | Mod: CPTII,S$GLB,, | Performed by: OBSTETRICS & GYNECOLOGY

## 2022-04-18 PROCEDURE — 77067 SCR MAMMO BI INCL CAD: CPT | Mod: TC,PN

## 2022-04-18 PROCEDURE — 99396 PREV VISIT EST AGE 40-64: CPT | Mod: S$GLB,,, | Performed by: OBSTETRICS & GYNECOLOGY

## 2022-04-18 PROCEDURE — 99999 PR PBB SHADOW E&M-EST. PATIENT-LVL III: ICD-10-PCS | Mod: PBBFAC,,, | Performed by: OBSTETRICS & GYNECOLOGY

## 2022-04-18 PROCEDURE — 77063 MAMMO DIGITAL SCREENING BILAT WITH TOMO: ICD-10-PCS | Mod: 26,,, | Performed by: RADIOLOGY

## 2022-04-18 RX ORDER — FERROUS SULFATE, DRIED 160(50) MG
1 TABLET, EXTENDED RELEASE ORAL
COMMUNITY
End: 2024-03-18

## 2022-04-18 RX ORDER — CEFDINIR 300 MG/1
CAPSULE ORAL
COMMUNITY
Start: 2022-01-09 | End: 2022-08-23

## 2022-04-18 RX ORDER — ZOSTER VACCINE RECOMBINANT, ADJUVANTED 50 MCG/0.5
KIT INTRAMUSCULAR
COMMUNITY
Start: 2021-11-11 | End: 2022-08-23

## 2022-04-18 NOTE — PROGRESS NOTES
CC: Well woman exam    SUBJECTIVE:   Bree Gonzales is a 61 y.o. female  (SVDx 2) here for annual routine Pap and checkup. No LMP recorded. Patient is postmenopausal..  She has no unusual complaints.      Pt is postmenopausal and is not on HRT. She denies hot flashes or night sweats. She denies postmenopausal bleeding.   Pt is not sexually active.    History of abnormal pap: Yes - h/o cryotherapy many years ago  Last Pap: , NILM- no HPV done  Last MMG: Yes - 3/11/2021  Last Colonoscopy:  Yes, scheduled for July with Dr. Bansal  Dexa: last year, osteoporosis, on fosamax. Followed by Dr. Rico     Family history of breast cancer in mom and sister. Mom was 74 and sister was 48.  Also maternal grandmother  Pt is negative for BRCA 1&2    Pt with hx of thyroid cancer s/p total thyroidectomy.  Followed by Dr. Vitale    Pt prior pt of Dr. Rojas.     Past Medical History:   Diagnosis Date    CKD (chronic kidney disease), stage III 10/12/2021    Family history of breast cancer     Fibromyalgia     Hypertension     Hypothyroidism     Malignant hyperthermia due to anesthesia     Osteoporosis     Skin cancer 10/12/2021    Thyroid cancer 10/12/2021     Past Surgical History:   Procedure Laterality Date    CATARACT EXTRACTION, BILATERAL      CHOLECYSTECTOMY  1981    DILATION AND CURETTAGE OF UTERUS      SKIN CANCER EXCISION      TOTAL THYROIDECTOMY  2002    TRIGGER FINGER RELEASE Right 2020     Social History     Socioeconomic History    Marital status:    Tobacco Use    Smoking status: Never Smoker    Smokeless tobacco: Never Used   Substance and Sexual Activity    Alcohol use: Never    Drug use: Never    Sexual activity: Yes     Partners: Male     Comment:      Family History   Problem Relation Age of Onset    Breast cancer Mother 74    Dementia Mother     Breast cancer Sister 51    Breast cancer Maternal Grandmother     Colon cancer Neg Hx     Ovarian  "cancer Neg Hx      OB History        2    Para   2    Term   2            AB        Living           SAB        IAB        Ectopic        Multiple        Live Births                     /78   Ht 5' 2" (1.575 m)   Wt 104.4 kg (230 lb 2.6 oz)   BMI 42.10 kg/m²     ROS:  GENERAL: Denies weight gain or weight loss. Feeling well overall.   SKIN: Denies rash or lesions.   HEAD: Denies head injury or headache.   NODES: Denies enlarged lymph nodes.   CHEST: Denies chest pain or shortness of breath.   CARDIOVASCULAR: Denies palpitations or left sided chest pain.   ABDOMEN: No abdominal pain, constipation, diarrhea, nausea, vomiting or rectal bleeding.   URINARY: No frequency, dysuria, hematuria, or burning on urination.  REPRODUCTIVE: See HPI.   BREASTS: The patient performs breast self-examination and denies pain, lumps, or nipple discharge.   HEMATOLOGIC: No easy bruisability or excessive bleeding.   MUSCULOSKELETAL: Denies joint pain or swelling.   NEUROLOGIC: Denies syncope or weakness.   PSYCHIATRIC: Denies depression, anxiety or mood swings.    PHYSICAL EXAM:  APPEARANCE: Well nourished, well developed, in no acute distress.  AFFECT: WNL, alert and oriented x 3  SKIN: No acne or hirsutism  NECK: Neck symmetric without masses or thyromegaly  NODES: No inguinal, cervical, axillary, or femoral lymph node enlargement  CHEST: Good respiratory effect  ABDOMEN: Soft.  No tenderness or masses.  No hepatosplenomegaly.  No hernias.  BREASTS: Symmetrical, no skin changes or visible lesions.  No palpable masses, nipple discharge bilaterally.  PELVIC: Normal external genitalia without lesions.  Normal hair distribution.  Adequate perineal body, normal urethral meatus.  Vagina mildly atrophic without lesions or discharge.  Cervix pink, without lesions, discharge or tenderness.  No significant cystocele or rectocele.  Bimanual exam shows uterus to be normal size, regular, mobile and nontender.  Adnexa without " masses or tenderness.    RECTAL: Rectovaginal exam confirms above with normal sphincter tone, no masses.  EXTREMITIES: No edema.    ASSESSMENT AND PLAN:    ICD-10-CM ICD-9-CM    1. Encounter for well woman exam with routine gynecological exam  Z01.419 V72.31    2. Screening mammogram for breast cancer  Z12.31 V76.12 Mammo Digital Screening Bilat w/ Dong         Bree was seen today for well woman.    Diagnoses and all orders for this visit:    Encounter for well woman exam with routine gynecological exam  - pap smear up to date.  Will do with cotesting next years 2023  - MMG ordered  - Cscope scheduled  - DEXA up to date    Screening mammogram for breast cancer  -     Mammo Digital Screening Bilat w/ Dong; Future      Orders Placed This Encounter   Procedures    Mammo Digital Screening Bilat w/ Odng       Follow up in about 1 year (around 4/18/2023) for annual.    Zara Beckett MD  Obstetrics & Gynecology

## 2022-05-30 ENCOUNTER — PATIENT OUTREACH (OUTPATIENT)
Dept: ADMINISTRATIVE | Facility: HOSPITAL | Age: 62
End: 2022-05-30
Payer: COMMERCIAL

## 2022-05-30 NOTE — PROGRESS NOTES
Patient due for the following    Pneumococcal Vaccines (Age 0-64) (2 - PCV)    COVID-19 Vaccine (4 - Booster for Pfizer series)    Colorectal Cancer Screening       [facility]    Immunizations: reviewed and updated  Care Everywhere: triggered  Care Teams: up to date  Outreach: none needed

## 2022-05-30 NOTE — LETTER
AUTHORIZATION FOR RELEASE OF   CONFIDENTIAL INFORMATION    Dear Dr. Bansal,    We are seeing Bree Gonzales, date of birth 1960, in the clinic at [facility]. Shana Jade MD is the patient's PCP. Bree Gonzales has an outstanding lab/procedure at the time we reviewed her chart. In order to help keep her health information updated, she has authorized us to request the following medical record(s):        (  )  MAMMOGRAM                                      ( X )  COLONOSCOPY      (  )  PAP SMEAR                                          (  )  OUTSIDE LAB RESULTS     (  )  DEXA SCAN                                          (  )  EYE EXAM            (  )  FOOT EXAM                                          (  )  ENTIRE RECORD     (  )  OUTSIDE IMMUNIZATIONS                 (  )  _______________         Please fax records to [facility], Nicole Giambrone, MD, [phone]     If you have any questions, please contact Amber at [phone].           Patient Name: Bree Gonzales  : 1960  Patient Phone #: [phone]

## 2022-06-07 LAB — CRC RECOMMENDATION EXT: NORMAL

## 2022-06-08 ENCOUNTER — PATIENT OUTREACH (OUTPATIENT)
Dept: ADMINISTRATIVE | Facility: HOSPITAL | Age: 62
End: 2022-06-08
Payer: COMMERCIAL

## 2022-06-08 NOTE — PROGRESS NOTES
Patient due for the following    Pneumococcal Vaccines (Age 0-64) (2 - PCV)    COVID-19 Vaccine (4 - Booster for Pfizer series)      Received c-scope records.  Scanned to media.  updated    Immunizations: reviewed and updated  Care Everywhere: triggered  Care Teams: up to date  Outreach: none needed

## 2022-06-14 ENCOUNTER — OFFICE VISIT (OUTPATIENT)
Dept: PRIMARY CARE CLINIC | Facility: CLINIC | Age: 62
End: 2022-06-14
Payer: COMMERCIAL

## 2022-06-14 ENCOUNTER — PATIENT MESSAGE (OUTPATIENT)
Dept: PRIMARY CARE CLINIC | Facility: CLINIC | Age: 62
End: 2022-06-14

## 2022-06-14 ENCOUNTER — TELEPHONE (OUTPATIENT)
Dept: PRIMARY CARE CLINIC | Facility: CLINIC | Age: 62
End: 2022-06-14
Payer: COMMERCIAL

## 2022-06-14 VITALS
RESPIRATION RATE: 18 BRPM | HEART RATE: 90 BPM | TEMPERATURE: 99 F | WEIGHT: 238.56 LBS | DIASTOLIC BLOOD PRESSURE: 78 MMHG | HEIGHT: 62 IN | SYSTOLIC BLOOD PRESSURE: 122 MMHG | OXYGEN SATURATION: 98 % | BODY MASS INDEX: 43.9 KG/M2

## 2022-06-14 DIAGNOSIS — R09.81 SINUS CONGESTION: ICD-10-CM

## 2022-06-14 DIAGNOSIS — H92.03 EAR PAIN, BILATERAL: Primary | ICD-10-CM

## 2022-06-14 DIAGNOSIS — J30.2 SEASONAL ALLERGIES: ICD-10-CM

## 2022-06-14 PROCEDURE — 99999 PR PBB SHADOW E&M-EST. PATIENT-LVL V: CPT | Mod: PBBFAC,,, | Performed by: NURSE PRACTITIONER

## 2022-06-14 PROCEDURE — 99213 PR OFFICE/OUTPT VISIT, EST, LEVL III, 20-29 MIN: ICD-10-PCS | Mod: S$GLB,,, | Performed by: NURSE PRACTITIONER

## 2022-06-14 PROCEDURE — 1160F PR REVIEW ALL MEDS BY PRESCRIBER/CLIN PHARMACIST DOCUMENTED: ICD-10-PCS | Mod: CPTII,S$GLB,, | Performed by: NURSE PRACTITIONER

## 2022-06-14 PROCEDURE — 3078F DIAST BP <80 MM HG: CPT | Mod: CPTII,S$GLB,, | Performed by: NURSE PRACTITIONER

## 2022-06-14 PROCEDURE — 99999 PR PBB SHADOW E&M-EST. PATIENT-LVL V: ICD-10-PCS | Mod: PBBFAC,,, | Performed by: NURSE PRACTITIONER

## 2022-06-14 PROCEDURE — 4010F PR ACE/ARB THEARPY RXD/TAKEN: ICD-10-PCS | Mod: CPTII,S$GLB,, | Performed by: NURSE PRACTITIONER

## 2022-06-14 PROCEDURE — 1160F RVW MEDS BY RX/DR IN RCRD: CPT | Mod: CPTII,S$GLB,, | Performed by: NURSE PRACTITIONER

## 2022-06-14 PROCEDURE — 99213 OFFICE O/P EST LOW 20 MIN: CPT | Mod: S$GLB,,, | Performed by: NURSE PRACTITIONER

## 2022-06-14 PROCEDURE — 3008F PR BODY MASS INDEX (BMI) DOCUMENTED: ICD-10-PCS | Mod: CPTII,S$GLB,, | Performed by: NURSE PRACTITIONER

## 2022-06-14 PROCEDURE — 3008F BODY MASS INDEX DOCD: CPT | Mod: CPTII,S$GLB,, | Performed by: NURSE PRACTITIONER

## 2022-06-14 PROCEDURE — 3074F PR MOST RECENT SYSTOLIC BLOOD PRESSURE < 130 MM HG: ICD-10-PCS | Mod: CPTII,S$GLB,, | Performed by: NURSE PRACTITIONER

## 2022-06-14 PROCEDURE — 3074F SYST BP LT 130 MM HG: CPT | Mod: CPTII,S$GLB,, | Performed by: NURSE PRACTITIONER

## 2022-06-14 PROCEDURE — 1159F MED LIST DOCD IN RCRD: CPT | Mod: CPTII,S$GLB,, | Performed by: NURSE PRACTITIONER

## 2022-06-14 PROCEDURE — 1159F PR MEDICATION LIST DOCUMENTED IN MEDICAL RECORD: ICD-10-PCS | Mod: CPTII,S$GLB,, | Performed by: NURSE PRACTITIONER

## 2022-06-14 PROCEDURE — 3078F PR MOST RECENT DIASTOLIC BLOOD PRESSURE < 80 MM HG: ICD-10-PCS | Mod: CPTII,S$GLB,, | Performed by: NURSE PRACTITIONER

## 2022-06-14 PROCEDURE — 4010F ACE/ARB THERAPY RXD/TAKEN: CPT | Mod: CPTII,S$GLB,, | Performed by: NURSE PRACTITIONER

## 2022-06-14 RX ORDER — PREDNISONE 20 MG/1
20 TABLET ORAL DAILY
Qty: 55 TABLET | Refills: 0 | Status: SHIPPED | OUTPATIENT
Start: 2022-06-14 | End: 2022-06-19

## 2022-06-14 NOTE — TELEPHONE ENCOUNTER
----- Message from Rosalinda Bangura sent at 6/14/2022  8:53 AM CDT -----  Contact: Pt [phone]  Pt called and stated that she seen you a few months ago and she was experiencing pain in one ear now it is in both ears. She stated that she has an appointment for 10:30 if she does not answer.     Thank  you       [id number] [place] - [address]  [address]  [place] [address]  Phone: [phone] Fax: [phone]

## 2022-06-14 NOTE — PROGRESS NOTES
[facility] Note    Chief Complaint      Chief Complaint   Patient presents with    Otalgia       History of Present Illness      Bree Gonzales is a 61 y.o. female who presents today for   Chief Complaint   Patient presents with    Otalgia   ear pain/congestion that has been ongoing for past 2 months and for sinus congestion. She denies any SOB, chest pain, N/V, unintentional weight loss, loss of appetite, fatigue, diarrhea, constipation. She is active daily and remains independent with ADL's.    Problem List Items Addressed This Visit    None     Visit Diagnoses     Ear pain, bilateral    -  Primary    Relevant Medications    predniSONE (DELTASONE) 20 MG tablet    Sinus congestion        Relevant Medications    predniSONE (DELTASONE) 20 MG tablet    Seasonal allergies              Review of Systems   Constitutional: Negative.    HENT: Positive for congestion, ear pain and sinus pain.    Eyes: Negative.    Respiratory: Negative.    Cardiovascular: Negative.    Gastrointestinal: Negative.    Genitourinary: Negative.    Musculoskeletal: Negative.    Skin: Negative.    Neurological: Negative.    Endo/Heme/Allergies: Negative.    Psychiatric/Behavioral: Negative.         Past Medical History:  Past Medical History:   Diagnosis Date    BRCA1 negative     BRCA2 negative     CKD (chronic kidney disease), stage III 10/12/2021    Family history of breast cancer     Fibromyalgia     Hypertension     Hypothyroidism     Malignant hyperthermia due to anesthesia     Osteoporosis     Skin cancer 10/12/2021    Thyroid cancer 10/12/2021       Past Surgical History:  Past Surgical History:   Procedure Laterality Date    CATARACT EXTRACTION, BILATERAL      CHOLECYSTECTOMY  05/1981    DILATION AND CURETTAGE OF UTERUS      SKIN CANCER EXCISION      TOTAL THYROIDECTOMY  03/2002    TRIGGER FINGER RELEASE Right 11/2020       Family History:  family history includes Breast cancer in her maternal  grandmother; Breast cancer (age of onset: 51) in her sister; Breast cancer (age of onset: 74) in her mother; Dementia in her mother.   Family history was reviewed with patient.    Social History:  Social History     Socioeconomic History    Marital status:    Tobacco Use    Smoking status: Never Smoker    Smokeless tobacco: Never Used   Substance and Sexual Activity    Alcohol use: Never    Drug use: Never    Sexual activity: Yes     Partners: Male     Comment:          Medications:  Outpatient Encounter Medications as of 6/14/2022   Medication Sig Dispense Refill    atorvastatin (LIPITOR) 20 MG tablet Take 1 tablet (20 mg total) by mouth once daily. 90 tablet 1    calcium-vitamin D3 (OS-MICH 500 + D3) 500 mg-5 mcg (200 unit) per tablet Take 1 tablet by mouth.      cefdinir (OMNICEF) 300 MG capsule TAKE ONE CAPSULE BY MOUTH EVERY 12 HOURS FOR 7 DAYS      cetirizine 10 mg Cap Take by mouth.      denosumab (PROLIA) 60 mg/mL Syrg Inject 1 mL (60 mg total) into the skin every 6 (six) months.      hydroCHLOROthiazide (HYDRODIURIL) 12.5 MG Tab Take 1 tablet (12.5 mg total) by mouth once daily. 90 tablet 1    losartan (COZAAR) 100 MG tablet 1 po daily 90 tablet 1    metoprolol succinate (TOPROL-XL) 50 MG 24 hr tablet Take 1 tablet (50 mg total) by mouth once daily. 90 tablet 1    pregabalin (LYRICA) 25 MG capsule Take 25 mg by mouth 3 (three) times daily with meals.      SHINGRIX, PF, 50 mcg/0.5 mL injection       SYNTHROID 200 mcg tablet TAKE 1 TABLET BY MOUTH ON MONDAY THRU SATURDAY AND 2 TABLETS ON SUNDAY      venlafaxine (EFFEXOR-XR) 150 MG Cp24 Take 1 capsule (150 mg total) by mouth every evening. 30 capsule 5    venlafaxine (EFFEXOR-XR) 37.5 MG 24 hr capsule TAKE 1 CAPSULE BY MOUTH EVERY DAY ALONG WITH 150MG CAPSULES 30 capsule 5    predniSONE (DELTASONE) 20 MG tablet Take 1 tablet (20 mg total) by mouth once daily. for 5 days 55 tablet 0     No facility-administered encounter  "medications on file as of 6/14/2022.       Allergies:  Review of patient's allergies indicates:   Allergen Reactions    Nsaids (non-steroidal anti-inflammatory drug)     Neosporin (neomycin-polymyx) Rash       Health Maintenance:  Health Maintenance   Topic Date Due    Mammogram  04/18/2023    TETANUS VACCINE  05/02/2024    Lipid Panel  02/22/2027    Hepatitis C Screening  Completed     Health Maintenance Topics with due status: Not Due       Topic Last Completion Date    TETANUS VACCINE 05/02/2014    Cervical Cancer Screening 08/13/2020    Lipid Panel 02/22/2022    Mammogram 04/18/2022    Colorectal Cancer Screening 06/07/2022       Physical Exam      Vital Signs  Temp: 98.6 °F (37 °C)  Pulse: 90  Resp: 18  SpO2: 98 %  BP: 122/78  Pain Score:   4  Pain Loc: Ear  Height and Weight  Height: 5' 2" (157.5 cm)  Weight: 108.2 kg (238 lb 8.6 oz)  BSA (Calculated - sq m): 2.18 sq meters  BMI (Calculated): 43.6  Weight in (lb) to have BMI = 25: 136.4]    Physical Exam  Constitutional:       Appearance: Normal appearance. She is normal weight.   HENT:      Head: Normocephalic and atraumatic.      Right Ear: Tympanic membrane, ear canal and external ear normal.      Left Ear: Tympanic membrane, ear canal and external ear normal.      Nose: Congestion present.      Mouth/Throat:      Mouth: Mucous membranes are moist.      Pharynx: Oropharynx is clear.   Eyes:      Extraocular Movements: Extraocular movements intact.      Conjunctiva/sclera: Conjunctivae normal.      Pupils: Pupils are equal, round, and reactive to light.   Cardiovascular:      Rate and Rhythm: Normal rate and regular rhythm.      Pulses: Normal pulses.      Heart sounds: Normal heart sounds.   Pulmonary:      Effort: Pulmonary effort is normal.      Breath sounds: Normal breath sounds.   Abdominal:      General: Abdomen is flat. Bowel sounds are normal.      Palpations: Abdomen is soft.   Musculoskeletal:         General: Normal range of motion.      " Cervical back: Normal range of motion and neck supple.   Skin:     General: Skin is warm and dry.      Capillary Refill: Capillary refill takes less than 2 seconds.   Neurological:      General: No focal deficit present.      Mental Status: She is alert and oriented to person, place, and time. Mental status is at baseline.   Psychiatric:         Mood and Affect: Mood normal.         Behavior: Behavior normal.         Thought Content: Thought content normal.         Judgment: Judgment normal.          Laboratory:  CBC:      CMP:        Invalid input(s): CREATININ  URINALYSIS:       LIPIDS:  Recent Labs   Lab 02/22/22  1009   HDL 57   Cholesterol 240 H   Triglycerides 210 H   LDL Cholesterol 148 H   HDL/Cholesterol Ratio 4.2   Non HDL Chol. (LDL+VLDL) 183 H     TSH:      A1C:        Radiology:        Assessment/Plan     Bree Gonzales is a 61 y.o.female with:    Ear pain, bilateral  -     predniSONE (DELTASONE) 20 MG tablet; Take 1 tablet (20 mg total) by mouth once daily. for 5 days  Dispense: 55 tablet; Refill: 0    Sinus congestion  -     predniSONE (DELTASONE) 20 MG tablet; Take 1 tablet (20 mg total) by mouth once daily. for 5 days  Dispense: 55 tablet; Refill: 0    Seasonal allergies    1) Ear pain/congestion:  - Prednisone 20 mg by mouth daily x 5 days    2) Sinus congestion:  - Saline nasal spray (Ocean Spray) to each nostril as needed. Can buy generic    3) Seasonal allergies:  - Continue cetirizine 10 mg by mouth every night    - Contact clinic if symptoms worsen    As above, continue current medications and maintain follow up with specialists.  Return to clinic as needed.    I spent 20 minutes on the day of this encounter for preparing, evaluating, examining, treating, and discussing plan of care with this patient.  Greater than 50% of this time was spent face to face with patient.  All questions were answered to patient's satisfaction.        Karen L Spencer, NP-C [facility]

## 2022-06-14 NOTE — PATIENT INSTRUCTIONS
1) Ear pain/congestion:  - Prednisone 20 mg by mouth daily x 5 days    2) Sinus congestion:  - Saline nasal spray (Ocean Spray) to each nostril as needed. Can buy generic    3) Seasonal allergies:  - Continue cetirizine 10 mg by mouth every night    - Contact clinic if symptoms worsen

## 2022-06-14 NOTE — TELEPHONE ENCOUNTER
lvm requesting pt contact the office. She will need an appt to be evaluated and have someone look into her ears

## 2022-08-22 NOTE — PROGRESS NOTES
[facility] Note    Chief Complaint      Chief Complaint   Patient presents with    Follow-up       History of Present Illness      Bree Gonzales is a 62 y.o.   F with HTN, Hypothyroidism, Depression, Anxiety, FM, OA, and Osteoporosis, Anemia of CKD III, HLD, Vit D def, FM, Peripheral neuropathy, Morbid Obesity, and fam h/o Breast Ca presents to fu chronic issues. Last visit - 2/22/22.    H/o Melanoma - Fu by Derm. Doing well. Had resection of lesion to Left cheek 6/17/21. Stable.      HTN - Pt controlled on HCTZ 12.5 mg/d, Toprol 50 mg/d, and Losartan 100 mg/d     Hypothyroidism - Pt on Brand Name Synthroid 200 mcg/d. Pt is s/p Total Thyroidectomy due to thyroid Ca. Fu by Dr. Iam Haile.      Depression - Pt on Venlafaxine  mg +37.5 mg/d. She recently moved and cares for her elderly Mom. Son recently bought a home and has anew GF and got a new job at [employer] and will be moving closer to home in 6 mos. Stable. No further crying spells.      Anxiety -  Pt on Venlafaxine  mg +37.5 mg/d. Her mom has dementia. Stable       Anemia of CKD III - Fu by Dr. Sarmiento.        CKD III -  Fu by Dr. Sarmiento.  Pt could not afford Farxiga 10 mg/d because insurance would not cover it.      HLD - Pt started atorvastatin 20 mg QHS in Feb. She c/o flatulence since starting this. She does not feel we need to stop the medication or try an alternate medication.  She will alert me if this changes.  In meantime she can try some Gas-X     Vit D def - Pt is on a MVI and Ca+D.      OA - Fu by Rheum, Dr. Grimes.      Osteoporosis - Pt on Prolia. Last inj was in June     FM - Pt on Lyrica 25 mg QAm and 2 tabs QHS.       Peripheral neuropathy - Foot pain - She c/o numbness and throbbing at night. Wearing socks help.  Cymbalta 20 mg/d no help. Pt is on Lyrica 25 mg QAm and 2 tabs QHS.     Morbid Obesity - BMI -44.19b Up 14 lb. No change in po intake or activity.  Pt reports a recent normal HA1c. Pt tries to limit  carbs. She does not exercise.  Rec goal 150 min/wk. She had recent TFT's with Dr. Vitale.      fam h/o Breast Ca - Pt is BRCA neg.      H/o Malignant Hyperthermia -s/p anesthesia     Lab review:   6/8/22 - BUN/Cr - 32/1.21 GFR 49  3/17/22 - BUN/Cr - 38/1.19 - GFR - 50;  H/H - 11.2/36; iPTH - 81; Fe - 68; TIBC 490 -elev; %Sat - 14%; Vit D - 52  12/6/21 - BUN/Cr - 33/1.10; H/H - 12/37.4.     HCM - Flu - 10/12/21;  Tdap - 5/2/14;  PCV 13 - none;  PVX 23 - none;  Shingrix - 8/26/20 and #2 - 11/11/21;  Zostavax - 2/24/15; COVID - 19 Vaccine (Pfiizer)  #1 2/26/21; #2 3/19/21; #3 - 10/26/21; MGM - 4/18/22 - repeat 1 yr;  DEXA - 11/2020 - +Osteoporosis;  PAP - ?; Hep C Screen - 12/6/21 - neg. ;  HIV Screen - 12/6/21 - neg. ; C-scope - '11- due;   Prev PCP - Dr. Duckworth;  Rheum - Dr. Grimes; Renal - Dr. Sarimento; Derm - [facility]; Hand Sx - Dr. Quiroz; Ob/GYN - NP - Quiana Sotomayor; Endo - Dr. Vitale; Ophtho - Dr. Kennedy; GI - Dr. Bansal; Well visit - 2/22/22    Patient Care Team:  Shana Jade MD as PCP - General (Internal Medicine)  Amber Sotomayor MA as Care Coordinator  Praneeth Bansal MD as Consulting Physician (Gastroenterology)     Health Maintenance:  Immunization History   Administered Date(s) Administered    COVID-19, MRNA, LN-S, PF (Pfizer) (Purple Cap) 02/26/2021, 03/19/2021, 10/26/2021, 08/01/2022    Influenza - Quadrivalent 10/03/2019    Influenza - Quadrivalent - MDCK - PF 08/26/2020    Influenza - Quadrivalent - PF *Preferred* (6 months and older) 10/03/2018, 10/12/2021    Pneumococcal Polysaccharide - 23 Valent 01/25/2017    Tdap 04/30/2014, 05/02/2014    Zoster 02/24/2015    Zoster Recombinant 08/26/2020, 11/11/2021      Health Maintenance   Topic Date Due    Mammogram  04/18/2023    TETANUS VACCINE  05/02/2024    Lipid Panel  02/22/2027    Hepatitis C Screening  Completed        Past Medical History:  Past Medical History:   Diagnosis Date    BRCA1 negative     BRCA2 negative      CKD (chronic kidney disease), stage III 10/12/2021    Family history of breast cancer     Fibromyalgia     Hypertension     Hypothyroidism     Malignant hyperthermia due to anesthesia     Osteoporosis     Skin cancer 10/12/2021    Thyroid cancer 10/12/2021       Past Surgical History:   has a past surgical history that includes Cholecystectomy (05/1981); Total thyroidectomy (03/2002); Skin cancer excision; Dilation and curettage of uterus; Trigger finger release (Right, 11/2020); and Cataract extraction, bilateral.    Family History:  family history includes Breast cancer in her maternal grandmother; Breast cancer (age of onset: 51) in her sister; Breast cancer (age of onset: 74) in her mother; Dementia in her mother.     Social History:  Social History     Tobacco Use    Smoking status: Never Smoker    Smokeless tobacco: Never Used   Substance Use Topics    Alcohol use: Never    Drug use: Never       Review of Systems   Constitutional: Positive for unexpected weight change. Negative for chills and fever.   HENT: Negative for hearing loss.    Eyes: Negative for visual disturbance.   Respiratory: Positive for shortness of breath. Negative for cough and chest tightness.         +MENDEZ going up an incline.  Not when at rest or walking on level ground.     Cardiovascular: Negative for chest pain and palpitations.   Gastrointestinal: Positive for abdominal distention. Negative for abdominal pain, constipation, diarrhea, nausea and vomiting.        Flatulence since starting atorvastatin.    Endocrine: Positive for heat intolerance. Negative for cold intolerance.        Rare hot flash - less freq.   Genitourinary: Negative for bladder incontinence, dysuria and frequency.   Musculoskeletal: Positive for arthralgias. Negative for myalgias.        Stiffness in AM - improves as she gets going.    Neurological: Negative for dizziness and headaches.   Psychiatric/Behavioral: Positive for dysphoric mood. Negative  "for suicidal ideas. The patient is nervous/anxious.         Stable.        Medications:    Current Outpatient Medications:     atorvastatin (LIPITOR) 20 MG tablet, TAKE 1 TABLET BY MOUTH EVERY DAY, Disp: 30 tablet, Rfl: 0    calcium-vitamin D3 (OS-MICH 500 + D3) 500 mg-5 mcg (200 unit) per tablet, Take 1 tablet by mouth., Disp: , Rfl:     cetirizine 10 mg Cap, Take by mouth., Disp: , Rfl:     denosumab (PROLIA) 60 mg/mL Syrg, Inject 1 mL (60 mg total) into the skin every 6 (six) months., Disp: , Rfl:     pregabalin (LYRICA) 25 MG capsule, Take 25 mg by mouth 3 (three) times daily with meals., Disp: , Rfl:     SYNTHROID 200 mcg tablet, Take one tablet 6 days a week and 1/2 tablet on Weds, Disp: , Rfl:     hydroCHLOROthiazide (HYDRODIURIL) 12.5 MG Tab, Take 1 tablet (12.5 mg total) by mouth once daily., Disp: 30 tablet, Rfl: 5    losartan (COZAAR) 100 MG tablet, 1 po daily, Disp: 30 tablet, Rfl: 5    metoprolol succinate (TOPROL-XL) 50 MG 24 hr tablet, Take 1 tablet (50 mg total) by mouth once daily., Disp: 30 tablet, Rfl: 5    venlafaxine (EFFEXOR-XR) 150 MG Cp24, Take 1 capsule (150 mg total) by mouth every evening., Disp: 30 capsule, Rfl: 5    venlafaxine (EFFEXOR-XR) 37.5 MG 24 hr capsule, TAKE 1 CAPSULE BY MOUTH EVERY DAY ALONG WITH 150MG CAPSULES, Disp: 30 capsule, Rfl: 5     Allergies:  Review of patient's allergies indicates:   Allergen Reactions    Nsaids (non-steroidal anti-inflammatory drug)     Neosporin (neomycin-polymyx) Rash       Physical Exam      Vital Signs  Temp: 98.1 °F (36.7 °C)  Temp src: Oral  Pulse: 93  Resp: 18  SpO2: 97 %  BP: (P) 128/80  BP Location: (P) Right arm  Patient Position: (P) Sitting  Pain Score: 0-No pain  Height and Weight  Height: 5' 2" (157.5 cm)  Weight: 109.6 kg (241 lb 10 oz)  BSA (Calculated - sq m): 2.19 sq meters  BMI (Calculated): 44.2  Weight in (lb) to have BMI = 25: 136.4      Patient Position: (P) Sitting      Physical Exam  Vitals reviewed. "   Constitutional:       General: She is not in acute distress.     Appearance: Normal appearance. She is obese. She is not ill-appearing, toxic-appearing or diaphoretic.   HENT:      Head: Normocephalic and atraumatic.      Right Ear: Tympanic membrane normal.      Left Ear: Tympanic membrane normal.      Mouth/Throat:      Comments: Purple papule to Left lower lip  Eyes:      Extraocular Movements: Extraocular movements intact.      Conjunctiva/sclera: Conjunctivae normal.      Pupils: Pupils are equal, round, and reactive to light.   Neck:      Vascular: No carotid bruit.   Cardiovascular:      Rate and Rhythm: Normal rate and regular rhythm.      Pulses: Normal pulses.      Heart sounds: Normal heart sounds. No murmur heard.  Pulmonary:      Effort: No respiratory distress.      Breath sounds: Normal breath sounds. No wheezing.   Abdominal:      General: Bowel sounds are normal. There is no distension.      Palpations: Abdomen is soft.      Tenderness: There is no abdominal tenderness. There is no guarding.   Musculoskeletal:         General: Normal range of motion.   Skin:     General: Skin is warm and dry.      Comments: Erythema/telangiectasias to cheeks   Neurological:      General: No focal deficit present.      Mental Status: She is alert and oriented to person, place, and time.   Psychiatric:         Mood and Affect: Mood normal.         Behavior: Behavior normal.          Laboratory:    LIPIDS:  Recent Labs   Lab 02/22/22  1009   HDL 57   Cholesterol 240 H   Triglycerides 210 H   LDL Cholesterol 148 H   HDL/Cholesterol Ratio 4.2   Non HDL Chol. (LDL+VLDL) 183 H         Recent Labs   Lab 12/06/21  0922   Hepatitis C Ab NON-REACTIVE       Assessment/Plan     Bree Gonzales is a 62 y.o.female with:    Hypertension, unspecified type  -     hydroCHLOROthiazide (HYDRODIURIL) 12.5 MG Tab; Take 1 tablet (12.5 mg total) by mouth once daily.  Dispense: 30 tablet; Refill: 5  -     losartan (COZAAR) 100 MG tablet; 1  po daily  Dispense: 30 tablet; Refill: 5  -     metoprolol succinate (TOPROL-XL) 50 MG 24 hr tablet; Take 1 tablet (50 mg total) by mouth once daily.  Dispense: 30 tablet; Refill: 5  - Controlled.  Cont current.     Hypercholesterolemia  -     Lipid Panel; Future; Expected date: 08/23/2022  -     Comprehensive Metabolic Panel; Future; Expected date: 08/23/2022  - Stable.  Cont current regimen. Pt on atorvastatin 20 mg QHS in Feb. She c/o flatulence since starting this. She does not feel we need to stop the medication or try an alternate medication.  She will alert me if this changes.  In meantime she can try some Gas-X    Depressive disorder  -     venlafaxine (EFFEXOR-XR) 150 MG Cp24; Take 1 capsule (150 mg total) by mouth every evening.  Dispense: 30 capsule; Refill: 5  -     venlafaxine (EFFEXOR-XR) 37.5 MG 24 hr capsule; TAKE 1 CAPSULE BY MOUTH EVERY DAY ALONG WITH 150MG CAPSULES  Dispense: 30 capsule; Refill: 5  - Stable.  Cont current regimen.    Anxiety  -     venlafaxine (EFFEXOR-XR) 37.5 MG 24 hr capsule; TAKE 1 CAPSULE BY MOUTH EVERY DAY ALONG WITH 150MG CAPSULES  Dispense: 30 capsule; Refill: 5  - Stable.  Cont current regimen.    Unexplained weight gain  - Recent TFT's wnl per pt.     Stage 3a chronic kidney disease  -Repeat CMP.    Idiopathic peripheral neuropathy  - Stable.  Cont current regimen.    Postoperative hypothyroidism  - Stable.  Cont current regimen. Will get recent labs/note from Dr. Vitale to review.    Osteoporosis, unspecified osteoporosis type, unspecified pathological fracture presence  - Stable.  Cont current regimen.    Morbid obesity with BMI of 40.0-44.9, adult  - Rec diet and exercise as discussed for wt loss.      Chronic conditions status updated as per HPI.  Other than changes above, cont current medications and maintain follow up with specialists.  Follow up in about 6 months (around 2/23/2023) for fu chronic issues or sooner if needed.      Shana Jade MD  [facility]

## 2022-08-23 ENCOUNTER — OFFICE VISIT (OUTPATIENT)
Dept: PRIMARY CARE CLINIC | Facility: CLINIC | Age: 62
End: 2022-08-23
Payer: COMMERCIAL

## 2022-08-23 VITALS
HEIGHT: 62 IN | RESPIRATION RATE: 18 BRPM | WEIGHT: 241.63 LBS | BODY MASS INDEX: 44.46 KG/M2 | OXYGEN SATURATION: 97 % | SYSTOLIC BLOOD PRESSURE: 130 MMHG | DIASTOLIC BLOOD PRESSURE: 86 MMHG | HEART RATE: 93 BPM | TEMPERATURE: 98 F

## 2022-08-23 DIAGNOSIS — E89.0 POSTOPERATIVE HYPOTHYROIDISM: ICD-10-CM

## 2022-08-23 DIAGNOSIS — E66.01 MORBID OBESITY WITH BMI OF 40.0-44.9, ADULT: ICD-10-CM

## 2022-08-23 DIAGNOSIS — M81.0 OSTEOPOROSIS, UNSPECIFIED OSTEOPOROSIS TYPE, UNSPECIFIED PATHOLOGICAL FRACTURE PRESENCE: ICD-10-CM

## 2022-08-23 DIAGNOSIS — N18.31 STAGE 3A CHRONIC KIDNEY DISEASE: ICD-10-CM

## 2022-08-23 DIAGNOSIS — G60.9 IDIOPATHIC PERIPHERAL NEUROPATHY: ICD-10-CM

## 2022-08-23 DIAGNOSIS — F32.A DEPRESSIVE DISORDER: ICD-10-CM

## 2022-08-23 DIAGNOSIS — F41.9 ANXIETY: ICD-10-CM

## 2022-08-23 DIAGNOSIS — I10 HYPERTENSION, UNSPECIFIED TYPE: Primary | ICD-10-CM

## 2022-08-23 DIAGNOSIS — E78.00 HYPERCHOLESTEROLEMIA: ICD-10-CM

## 2022-08-23 DIAGNOSIS — R63.5 UNEXPLAINED WEIGHT GAIN: ICD-10-CM

## 2022-08-23 PROCEDURE — 99214 PR OFFICE/OUTPT VISIT, EST, LEVL IV, 30-39 MIN: ICD-10-PCS | Mod: S$GLB,,, | Performed by: INTERNAL MEDICINE

## 2022-08-23 PROCEDURE — 4010F PR ACE/ARB THEARPY RXD/TAKEN: ICD-10-PCS | Mod: CPTII,S$GLB,, | Performed by: INTERNAL MEDICINE

## 2022-08-23 PROCEDURE — 1159F MED LIST DOCD IN RCRD: CPT | Mod: CPTII,S$GLB,, | Performed by: INTERNAL MEDICINE

## 2022-08-23 PROCEDURE — 1159F PR MEDICATION LIST DOCUMENTED IN MEDICAL RECORD: ICD-10-PCS | Mod: CPTII,S$GLB,, | Performed by: INTERNAL MEDICINE

## 2022-08-23 PROCEDURE — 4010F ACE/ARB THERAPY RXD/TAKEN: CPT | Mod: CPTII,S$GLB,, | Performed by: INTERNAL MEDICINE

## 2022-08-23 PROCEDURE — 3075F SYST BP GE 130 - 139MM HG: CPT | Mod: CPTII,S$GLB,, | Performed by: INTERNAL MEDICINE

## 2022-08-23 PROCEDURE — 3079F PR MOST RECENT DIASTOLIC BLOOD PRESSURE 80-89 MM HG: ICD-10-PCS | Mod: CPTII,S$GLB,, | Performed by: INTERNAL MEDICINE

## 2022-08-23 PROCEDURE — 3008F BODY MASS INDEX DOCD: CPT | Mod: CPTII,S$GLB,, | Performed by: INTERNAL MEDICINE

## 2022-08-23 PROCEDURE — 3008F PR BODY MASS INDEX (BMI) DOCUMENTED: ICD-10-PCS | Mod: CPTII,S$GLB,, | Performed by: INTERNAL MEDICINE

## 2022-08-23 PROCEDURE — 99999 PR PBB SHADOW E&M-EST. PATIENT-LVL V: CPT | Mod: PBBFAC,,, | Performed by: INTERNAL MEDICINE

## 2022-08-23 PROCEDURE — 1160F RVW MEDS BY RX/DR IN RCRD: CPT | Mod: CPTII,S$GLB,, | Performed by: INTERNAL MEDICINE

## 2022-08-23 PROCEDURE — 3075F PR MOST RECENT SYSTOLIC BLOOD PRESS GE 130-139MM HG: ICD-10-PCS | Mod: CPTII,S$GLB,, | Performed by: INTERNAL MEDICINE

## 2022-08-23 PROCEDURE — 99999 PR PBB SHADOW E&M-EST. PATIENT-LVL V: ICD-10-PCS | Mod: PBBFAC,,, | Performed by: INTERNAL MEDICINE

## 2022-08-23 PROCEDURE — 3079F DIAST BP 80-89 MM HG: CPT | Mod: CPTII,S$GLB,, | Performed by: INTERNAL MEDICINE

## 2022-08-23 PROCEDURE — 1160F PR REVIEW ALL MEDS BY PRESCRIBER/CLIN PHARMACIST DOCUMENTED: ICD-10-PCS | Mod: CPTII,S$GLB,, | Performed by: INTERNAL MEDICINE

## 2022-08-23 PROCEDURE — 99214 OFFICE O/P EST MOD 30 MIN: CPT | Mod: S$GLB,,, | Performed by: INTERNAL MEDICINE

## 2022-08-23 RX ORDER — VENLAFAXINE HYDROCHLORIDE 150 MG/1
150 CAPSULE, EXTENDED RELEASE ORAL NIGHTLY
Qty: 30 CAPSULE | Refills: 5 | Status: SHIPPED | OUTPATIENT
Start: 2022-08-23 | End: 2023-02-12

## 2022-08-23 RX ORDER — VENLAFAXINE HYDROCHLORIDE 37.5 MG/1
CAPSULE, EXTENDED RELEASE ORAL
Qty: 30 CAPSULE | Refills: 5 | Status: SHIPPED | OUTPATIENT
Start: 2022-08-23 | End: 2023-02-12

## 2022-08-23 RX ORDER — VENLAFAXINE HYDROCHLORIDE 37.5 MG/1
CAPSULE, EXTENDED RELEASE ORAL
Qty: 90 CAPSULE | Refills: 0 | OUTPATIENT
Start: 2022-08-23

## 2022-08-23 RX ORDER — LOSARTAN POTASSIUM 100 MG/1
TABLET ORAL
Qty: 30 TABLET | Refills: 5 | Status: SHIPPED | OUTPATIENT
Start: 2022-08-23 | End: 2023-03-01 | Stop reason: SDUPTHER

## 2022-08-23 RX ORDER — HYDROCHLOROTHIAZIDE 12.5 MG/1
12.5 TABLET ORAL DAILY
Qty: 30 TABLET | Refills: 5 | Status: SHIPPED | OUTPATIENT
Start: 2022-08-23 | End: 2023-03-01 | Stop reason: SDUPTHER

## 2022-08-23 RX ORDER — METOPROLOL SUCCINATE 50 MG/1
50 TABLET, EXTENDED RELEASE ORAL DAILY
Qty: 30 TABLET | Refills: 5 | Status: SHIPPED | OUTPATIENT
Start: 2022-08-23 | End: 2023-03-01 | Stop reason: SDUPTHER

## 2022-08-23 NOTE — TELEPHONE ENCOUNTER
Refill Routing Note   Medication(s) are not appropriate for processing by [facility] for the following reason(s):      - Required laboratory values are outdated    [facility] action(s):  Defer          Medication reconciliation completed: No     Appointments  past 12m or future 3m with PCP    Date Provider   Last Visit   2/22/2022 Shana Jade MD   Next Visit   8/23/2022 Shana Jade MD   ED visits in past 90 days: 0        Note composed:8:43 AM 08/23/2022

## 2022-08-23 NOTE — TELEPHONE ENCOUNTER
No new care gaps identified.  [facility] Embedded Care Gaps. Reference number: [id number]. 8/23/2022   12:05:59 AM SAIRAT

## 2022-08-26 LAB
ALBUMIN SERPL-MCNC: 4.1 G/DL (ref 3.6–5.1)
ALBUMIN/GLOB SERPL: 1.5 (CALC) (ref 1–2.5)
ALP SERPL-CCNC: 90 U/L (ref 37–153)
ALT SERPL-CCNC: 17 U/L (ref 6–29)
AST SERPL-CCNC: 18 U/L (ref 10–35)
BILIRUB SERPL-MCNC: 0.5 MG/DL (ref 0.2–1.2)
BUN SERPL-MCNC: 37 MG/DL (ref 7–25)
BUN/CREAT SERPL: 31 (CALC) (ref 6–22)
CALCIUM SERPL-MCNC: 9.7 MG/DL (ref 8.6–10.4)
CHLORIDE SERPL-SCNC: 100 MMOL/L (ref 98–110)
CHOLEST SERPL-MCNC: 185 MG/DL
CHOLEST/HDLC SERPL: 3.1 (CALC)
CO2 SERPL-SCNC: 31 MMOL/L (ref 20–32)
CREAT SERPL-MCNC: 1.2 MG/DL (ref 0.5–1.05)
EGFR: 52 ML/MIN/1.73M2
GLOBULIN SER CALC-MCNC: 2.7 G/DL (CALC) (ref 1.9–3.7)
GLUCOSE SERPL-MCNC: 102 MG/DL (ref 65–99)
HDLC SERPL-MCNC: 60 MG/DL
LDLC SERPL CALC-MCNC: 98 MG/DL (CALC)
NONHDLC SERPL-MCNC: 125 MG/DL (CALC)
POTASSIUM SERPL-SCNC: 3.6 MMOL/L (ref 3.5–5.3)
PROT SERPL-MCNC: 6.8 G/DL (ref 6.1–8.1)
SODIUM SERPL-SCNC: 141 MMOL/L (ref 135–146)
TRIGL SERPL-MCNC: 178 MG/DL

## 2022-08-29 NOTE — PROGRESS NOTES
I sent pt a my chart message -  I reviewed your  labs.   Your Cholesterol looked good. Continue your Atorvastatin. .  Your kidney function was stable and liver functions looked good. It does appear you could increase your hydration. Your glucose was borderline high for being fasting at 102.  We can check a Ha1c with your next labs.  No further recommendations at this time.    Dr. ROGERS

## 2022-09-12 ENCOUNTER — PATIENT OUTREACH (OUTPATIENT)
Dept: ADMINISTRATIVE | Facility: HOSPITAL | Age: 62
End: 2022-09-12
Payer: COMMERCIAL

## 2022-09-12 NOTE — PROGRESS NOTES
Patient due for the following   Health Maintenance Due   Topic    Pneumococcal Vaccines (Age 0-64) (2 - PCV)    Influenza Vaccine (1)      Received c-scope path report.  Scanned to media.  updated    Immunizations: reviewed and updated  Care Everywhere: triggered  Care Teams: up to date  Outreach: none needed

## 2022-11-10 ENCOUNTER — TELEPHONE (OUTPATIENT)
Dept: PRIMARY CARE CLINIC | Facility: CLINIC | Age: 62
End: 2022-11-10
Payer: COMMERCIAL

## 2022-11-10 ENCOUNTER — PATIENT MESSAGE (OUTPATIENT)
Dept: PRIMARY CARE CLINIC | Facility: CLINIC | Age: 62
End: 2022-11-10
Payer: COMMERCIAL

## 2022-11-10 NOTE — TELEPHONE ENCOUNTER
----- Message from Darcy Capone sent at 11/10/2022  1:58 PM CST -----  Contact: RAYMUNDO MCMULLEN [[id number]][phone]  She will be having hand surgery and they sent some forms to be filled out from her last appointment. Please call her.

## 2022-11-10 NOTE — TELEPHONE ENCOUNTER
----- Message from Chelsea Stearns sent at 11/10/2022  4:10 PM CST -----  Contact: Self/[phone]  Patient is returning a phone call.  Who left a message for the patient: Ryann   Does patient know what this is regarding:  Yes  Would you like a call back, or a response through your [facility] portal?:   call back   Comments: pt stated that she was just seen in the office and wants to know why she needs an ov to get form filled out , pt has scheduled an appt for 11/14 with Dr Graves

## 2022-11-14 ENCOUNTER — OFFICE VISIT (OUTPATIENT)
Dept: PRIMARY CARE CLINIC | Facility: CLINIC | Age: 62
End: 2022-11-14
Payer: COMMERCIAL

## 2022-11-14 ENCOUNTER — LAB VISIT (OUTPATIENT)
Dept: LAB | Facility: HOSPITAL | Age: 62
End: 2022-11-14
Attending: INTERNAL MEDICINE
Payer: COMMERCIAL

## 2022-11-14 VITALS
SYSTOLIC BLOOD PRESSURE: 128 MMHG | BODY MASS INDEX: 43.45 KG/M2 | DIASTOLIC BLOOD PRESSURE: 74 MMHG | TEMPERATURE: 98 F | HEART RATE: 88 BPM | HEIGHT: 62 IN | WEIGHT: 236.13 LBS | OXYGEN SATURATION: 99 % | RESPIRATION RATE: 18 BRPM

## 2022-11-14 DIAGNOSIS — Z01.818 PREOPERATIVE CLEARANCE: Primary | ICD-10-CM

## 2022-11-14 DIAGNOSIS — T88.3XXS: ICD-10-CM

## 2022-11-14 DIAGNOSIS — N18.31 STAGE 3A CHRONIC KIDNEY DISEASE: ICD-10-CM

## 2022-11-14 DIAGNOSIS — E66.01 MORBID OBESITY WITH BMI OF 40.0-44.9, ADULT: ICD-10-CM

## 2022-11-14 DIAGNOSIS — Z01.818 PREOPERATIVE CLEARANCE: ICD-10-CM

## 2022-11-14 LAB
ALBUMIN SERPL BCP-MCNC: 3.6 G/DL (ref 3.5–5.2)
ANION GAP SERPL CALC-SCNC: 10 MMOL/L (ref 8–16)
BASOPHILS # BLD AUTO: 0.06 K/UL (ref 0–0.2)
BASOPHILS NFR BLD: 0.8 % (ref 0–1.9)
BUN SERPL-MCNC: 36 MG/DL (ref 8–23)
CALCIUM SERPL-MCNC: 10.5 MG/DL (ref 8.7–10.5)
CHLORIDE SERPL-SCNC: 99 MMOL/L (ref 95–110)
CO2 SERPL-SCNC: 30 MMOL/L (ref 23–29)
CREAT SERPL-MCNC: 1.1 MG/DL (ref 0.5–1.4)
DIFFERENTIAL METHOD: ABNORMAL
EOSINOPHIL # BLD AUTO: 0.2 K/UL (ref 0–0.5)
EOSINOPHIL NFR BLD: 3.2 % (ref 0–8)
ERYTHROCYTE [DISTWIDTH] IN BLOOD BY AUTOMATED COUNT: 15.1 % (ref 11.5–14.5)
EST. GFR  (NO RACE VARIABLE): 57.2 ML/MIN/1.73 M^2
GLUCOSE SERPL-MCNC: 89 MG/DL (ref 70–110)
HCT VFR BLD AUTO: 38.7 % (ref 37–48.5)
HGB BLD-MCNC: 12.4 G/DL (ref 12–16)
IMM GRANULOCYTES # BLD AUTO: 0.02 K/UL (ref 0–0.04)
IMM GRANULOCYTES NFR BLD AUTO: 0.3 % (ref 0–0.5)
LYMPHOCYTES # BLD AUTO: 1.3 K/UL (ref 1–4.8)
LYMPHOCYTES NFR BLD: 17.3 % (ref 18–48)
MCH RBC QN AUTO: 28.6 PG (ref 27–31)
MCHC RBC AUTO-ENTMCNC: 32 G/DL (ref 32–36)
MCV RBC AUTO: 89 FL (ref 82–98)
MONOCYTES # BLD AUTO: 0.8 K/UL (ref 0.3–1)
MONOCYTES NFR BLD: 10.1 % (ref 4–15)
NEUTROPHILS # BLD AUTO: 5.1 K/UL (ref 1.8–7.7)
NEUTROPHILS NFR BLD: 68.3 % (ref 38–73)
NRBC BLD-RTO: 0 /100 WBC
PHOSPHATE SERPL-MCNC: 3.6 MG/DL (ref 2.7–4.5)
PLATELET # BLD AUTO: 275 K/UL (ref 150–450)
PMV BLD AUTO: 10.3 FL (ref 9.2–12.9)
POTASSIUM SERPL-SCNC: 4 MMOL/L (ref 3.5–5.1)
RBC # BLD AUTO: 4.33 M/UL (ref 4–5.4)
SODIUM SERPL-SCNC: 139 MMOL/L (ref 136–145)
WBC # BLD AUTO: 7.52 K/UL (ref 3.9–12.7)

## 2022-11-14 PROCEDURE — 93005 EKG 12-LEAD: ICD-10-PCS | Mod: S$GLB,,, | Performed by: INTERNAL MEDICINE

## 2022-11-14 PROCEDURE — 3074F SYST BP LT 130 MM HG: CPT | Mod: CPTII,S$GLB,, | Performed by: INTERNAL MEDICINE

## 2022-11-14 PROCEDURE — 80069 RENAL FUNCTION PANEL: CPT | Performed by: INTERNAL MEDICINE

## 2022-11-14 PROCEDURE — 99214 PR OFFICE/OUTPT VISIT, EST, LEVL IV, 30-39 MIN: ICD-10-PCS | Mod: S$GLB,,, | Performed by: INTERNAL MEDICINE

## 2022-11-14 PROCEDURE — 99999 PR PBB SHADOW E&M-EST. PATIENT-LVL V: ICD-10-PCS | Mod: PBBFAC,,, | Performed by: INTERNAL MEDICINE

## 2022-11-14 PROCEDURE — 4010F PR ACE/ARB THEARPY RXD/TAKEN: ICD-10-PCS | Mod: CPTII,S$GLB,, | Performed by: INTERNAL MEDICINE

## 2022-11-14 PROCEDURE — 3008F PR BODY MASS INDEX (BMI) DOCUMENTED: ICD-10-PCS | Mod: CPTII,S$GLB,, | Performed by: INTERNAL MEDICINE

## 2022-11-14 PROCEDURE — 85025 COMPLETE CBC W/AUTO DIFF WBC: CPT | Performed by: INTERNAL MEDICINE

## 2022-11-14 PROCEDURE — 3078F PR MOST RECENT DIASTOLIC BLOOD PRESSURE < 80 MM HG: ICD-10-PCS | Mod: CPTII,S$GLB,, | Performed by: INTERNAL MEDICINE

## 2022-11-14 PROCEDURE — 93010 EKG 12-LEAD: ICD-10-PCS | Mod: S$GLB,,, | Performed by: INTERNAL MEDICINE

## 2022-11-14 PROCEDURE — 36415 COLL VENOUS BLD VENIPUNCTURE: CPT | Mod: PN | Performed by: INTERNAL MEDICINE

## 2022-11-14 PROCEDURE — 93010 ELECTROCARDIOGRAM REPORT: CPT | Mod: S$GLB,,, | Performed by: INTERNAL MEDICINE

## 2022-11-14 PROCEDURE — 3078F DIAST BP <80 MM HG: CPT | Mod: CPTII,S$GLB,, | Performed by: INTERNAL MEDICINE

## 2022-11-14 PROCEDURE — 4010F ACE/ARB THERAPY RXD/TAKEN: CPT | Mod: CPTII,S$GLB,, | Performed by: INTERNAL MEDICINE

## 2022-11-14 PROCEDURE — 1159F PR MEDICATION LIST DOCUMENTED IN MEDICAL RECORD: ICD-10-PCS | Mod: CPTII,S$GLB,, | Performed by: INTERNAL MEDICINE

## 2022-11-14 PROCEDURE — 99214 OFFICE O/P EST MOD 30 MIN: CPT | Mod: S$GLB,,, | Performed by: INTERNAL MEDICINE

## 2022-11-14 PROCEDURE — 1160F RVW MEDS BY RX/DR IN RCRD: CPT | Mod: CPTII,S$GLB,, | Performed by: INTERNAL MEDICINE

## 2022-11-14 PROCEDURE — 3008F BODY MASS INDEX DOCD: CPT | Mod: CPTII,S$GLB,, | Performed by: INTERNAL MEDICINE

## 2022-11-14 PROCEDURE — 1159F MED LIST DOCD IN RCRD: CPT | Mod: CPTII,S$GLB,, | Performed by: INTERNAL MEDICINE

## 2022-11-14 PROCEDURE — 3074F PR MOST RECENT SYSTOLIC BLOOD PRESSURE < 130 MM HG: ICD-10-PCS | Mod: CPTII,S$GLB,, | Performed by: INTERNAL MEDICINE

## 2022-11-14 PROCEDURE — 93005 ELECTROCARDIOGRAM TRACING: CPT | Mod: S$GLB,,, | Performed by: INTERNAL MEDICINE

## 2022-11-14 PROCEDURE — 1160F PR REVIEW ALL MEDS BY PRESCRIBER/CLIN PHARMACIST DOCUMENTED: ICD-10-PCS | Mod: CPTII,S$GLB,, | Performed by: INTERNAL MEDICINE

## 2022-11-14 PROCEDURE — 99999 PR PBB SHADOW E&M-EST. PATIENT-LVL V: CPT | Mod: PBBFAC,,, | Performed by: INTERNAL MEDICINE

## 2022-11-14 RX ORDER — BNT162B2 0.23 MG/2.25ML
INJECTION, SUSPENSION INTRAMUSCULAR
COMMUNITY
Start: 2022-08-01 | End: 2023-03-01

## 2022-11-14 RX ORDER — ASCORBIC ACID 125 MG
TABLET,CHEWABLE ORAL
COMMUNITY
End: 2023-07-10

## 2022-11-14 RX ORDER — KETOCONAZOLE 20 MG/G
CREAM TOPICAL 2 TIMES DAILY
COMMUNITY
Start: 2022-10-19 | End: 2023-03-01

## 2022-11-14 NOTE — LETTER
November 14, 2022    Bree Gonzales  [address] [facility] [place] [address]             [facility]  [address] [facility] [address]  Phone: [phone]  Fax: [phone] Dear Dr. Quiroz,    Ms. Bree Gonzales ([id number]) was recently seen and assessed in my clinic for pre-operative assessment for right hand trigger finger release, which is currently scheduled on 11/30/2022.  She is considered a low-risk patient for a low-risk procedure, as long as she does not receive general anesthetic given her history of malignant hyperthermia and does not require any additional cardiac testing at this time.    Recent labs have been reviewed and are acceptable.    She can follow up with her PCP, Dr. Jade as needed following surgery.  Should you have any questions regarding her care, please feel free to contact my office at [phone].    Thank you for you assistance with her care.    Selene Price M.D.  Section of Internal Medicine/Primary Care

## 2022-11-14 NOTE — PROGRESS NOTES
Subjective:      Patient ID: Bree Gonzales is a 62 y.o. female.    Chief Complaint: Pre-op Exam    Bree Gonzales is a 62 y.o.   F with HTN, Hypothyroidism, Depression, Anxiety, FM, OA, and Osteoporosis, Anemia of CKD III, HLD, Vit D def, FM, Peripheral neuropathy, Morbid Obesity, and fam h/o Breast Ca presents who follows with Dr. Jade, here for preoperative examination.     H/o Malignant Hyperthermia -s/p anesthesia. She is undergoing trigger finger surgery on 11/30/2022. She has had a hx of malignant hyperthermia and for her procedure on 11/30/2022, they will not be using general anesthesia for this procedure. Pt has no active cardiac condition (ACS/USA, decompenstated CHF, significant arrhythmias or severe valvular disease) and can easily achieve 4 METS. She has no resting chest pain, SOB.  Pt does not require any further workup prior to right hand trigger finger surgery. These recommendations follow the most current Guideline on Perioperative Cardiovascular Evaluation and Management of Patients Undergoing Noncardiac Surgery released by the ACC/AHA. (JACC 2014.07.944).    Denies any chest pain, shortness of breath, nausea vomiting constipation diarrhea, blood in stool, heartburn    Review of Systems   Constitutional:  Negative for chills, fever and weight loss.   HENT:  Negative for congestion, ear pain and sore throat.    Eyes:  Negative for double vision.   Respiratory:  Negative for cough and shortness of breath.    Cardiovascular:  Negative for chest pain, palpitations and leg swelling.   Gastrointestinal:  Negative for abdominal pain, heartburn, nausea and vomiting.   Skin:  Negative for rash.   Neurological:  Negative for dizziness, tingling and headaches.   Psychiatric/Behavioral:  Negative for depression.        Current Outpatient Medications:     atorvastatin (LIPITOR) 20 MG tablet, TAKE 1 TABLET BY MOUTH EVERY DAY, Disp: 90 tablet, Rfl: 1    calcium-vitamin D3 (OS-MICH 500 + D3) 500 mg-5 mcg  (200 unit) per tablet, Take 1 tablet by mouth., Disp: , Rfl:     cetirizine 10 mg Cap, Take by mouth., Disp: , Rfl:     cyanocobalamin, vitamin B-12, 5,000 mcg Cap, Take by mouth., Disp: , Rfl:     denosumab (PROLIA) 60 mg/mL Syrg, Inject 1 mL (60 mg total) into the skin every 6 (six) months., Disp: , Rfl:     hydroCHLOROthiazide (HYDRODIURIL) 12.5 MG Tab, Take 1 tablet (12.5 mg total) by mouth once daily., Disp: 30 tablet, Rfl: 5    ketoconazole (NIZORAL) 2 % cream, Apply topically 2 (two) times daily., Disp: , Rfl:     losartan (COZAAR) 100 MG tablet, 1 po daily, Disp: 30 tablet, Rfl: 5    metoprolol succinate (TOPROL-XL) 50 MG 24 hr tablet, Take 1 tablet (50 mg total) by mouth once daily., Disp: 30 tablet, Rfl: 5    PFIZER COVID-19 SIOBHAN VACCN,PF, 30 mcg/0.3 mL injection, , Disp: , Rfl:     pregabalin (LYRICA) 25 MG capsule, Take 25 mg by mouth 3 (three) times daily with meals., Disp: , Rfl:     SYNTHROID 200 mcg tablet, Take one tablet 6 days a week and 1/2 tablet on Weds, Disp: , Rfl:     venlafaxine (EFFEXOR-XR) 150 MG Cp24, Take 1 capsule (150 mg total) by mouth every evening., Disp: 30 capsule, Rfl: 5    venlafaxine (EFFEXOR-XR) 37.5 MG 24 hr capsule, TAKE 1 CAPSULE BY MOUTH EVERY DAY ALONG WITH 150MG CAPSULES, Disp: 30 capsule, Rfl: 5    No results found for: HGBA1C  No results found for: MICALBCREAT  Lab Results   Component Value Date    LDLCALC 98 08/25/2022    LDLCALC 148 (H) 02/22/2022    CHOL 185 08/25/2022    HDL 60 08/25/2022    TRIG 178 (H) 08/25/2022       Lab Results   Component Value Date     08/25/2022    K 3.6 08/25/2022     08/25/2022    CO2 31 08/25/2022     (H) 08/25/2022    BUN 37 (H) 08/25/2022    CREATININE 1.20 (H) 08/25/2022    CALCIUM 9.7 08/25/2022    PROT 6.8 08/25/2022    ALBUMIN 4.1 08/25/2022    BILITOT 0.5 08/25/2022    AST 18 08/25/2022    ALT 17 08/25/2022    HEPCAB NON-REACTIVE 12/06/2021       No results found for: LH, FSH, TOTALTESTOST, PROGESTERONE,  "ESTRADIOL, [id number], [id number], FERRITIN, IRON, TRANSFERRIN, TIBC, FESATURATED, ZINC      Past Medical History:   Diagnosis Date    BRCA1 negative     BRCA2 negative     CKD (chronic kidney disease), stage III 10/12/2021    Family history of breast cancer     Fibromyalgia     Hypertension     Hypothyroidism     Malignant hyperthermia due to anesthesia     Osteoporosis     Skin cancer 10/12/2021    Thyroid cancer 10/12/2021     Past Surgical History:   Procedure Laterality Date    CATARACT EXTRACTION, BILATERAL      CHOLECYSTECTOMY  05/1981    DILATION AND CURETTAGE OF UTERUS      SKIN CANCER EXCISION      TOTAL THYROIDECTOMY  03/2002    TRIGGER FINGER RELEASE Right 11/2020     Social History     Social History Narrative    Not on file     Family History   Problem Relation Age of Onset    Breast cancer Mother 74    Dementia Mother     Breast cancer Sister 51    Breast cancer Maternal Grandmother     Colon cancer Neg Hx     Ovarian cancer Neg Hx      Vitals:    11/14/22 0916   BP: 128/74   Pulse: 88   Resp: 18   Temp: 98 °F (36.7 °C)   SpO2: 99%   Weight: 107.1 kg (236 lb 1.8 oz)   Height: 5' 2" (1.575 m)   PainSc:   4     Objective:   Physical Exam  Vitals reviewed.   Constitutional:       Appearance: Normal appearance.   HENT:      Head: Normocephalic.      Right Ear: Tympanic membrane, ear canal and external ear normal.      Left Ear: Tympanic membrane, ear canal and external ear normal.      Nose: Nose normal.      Mouth/Throat:      Mouth: Mucous membranes are moist.      Pharynx: Oropharynx is clear.   Eyes:      Conjunctiva/sclera: Conjunctivae normal.      Pupils: Pupils are equal, round, and reactive to light.   Cardiovascular:      Rate and Rhythm: Normal rate and regular rhythm.      Pulses: Normal pulses.   Pulmonary:      Effort: Pulmonary effort is normal.      Breath sounds: Normal breath sounds.   Abdominal:      General: Abdomen is flat. Bowel sounds are normal.      Palpations: Abdomen is soft. "   Musculoskeletal:      Cervical back: Neck supple.   Skin:     General: Skin is warm.   Neurological:      General: No focal deficit present.      Mental Status: She is alert.   Psychiatric:         Mood and Affect: Mood normal.     Assessment:     1. Preoperative clearance    2. Malignant hyperthermia due to anesthesia, sequela    3. Morbid obesity with BMI of 40.0-44.9, adult    4. Stage 3a chronic kidney disease      Plan:     Orders Placed This Encounter    RENAL FUNCTION PANEL    CBC Auto Differential    IN OFFICE EKG 12-LEAD (to Muse)   Pt has no active cardiac condition (ACS/USA, decompenstated CHF, significant arrhythmias or severe valvular disease) and can easily achieve 4 METS.  Pt does not require any further workup prior to right hand trigger finger release. These recommendations follow the most current Guideline on Perioperative Cardiovascular Evaluation and Management of Patients Undergoing Noncardiac Surgery released by the ACC/AHA. (JACC 2014.07.944).

## 2022-11-15 NOTE — PROGRESS NOTES
Your blood count (CBC) is unremarkable.    Your sugar number (Glucose) is within normal limits.  Rest of your electrolytes are unremarkable.    Your kidney (BUN, Creatinine and GFT) function is stable

## 2023-02-11 DIAGNOSIS — F41.9 ANXIETY: ICD-10-CM

## 2023-02-11 DIAGNOSIS — F32.A DEPRESSIVE DISORDER: ICD-10-CM

## 2023-02-11 NOTE — TELEPHONE ENCOUNTER
No new care gaps identified.  [facility] Embedded Care Gaps. Reference number: [id number]. 2/11/2023   12:16:27 AM CST

## 2023-02-11 NOTE — TELEPHONE ENCOUNTER
Refill Routing Note   Medication(s) are not appropriate for processing by [facility] for the following reason(s):       Drug-disease interaction (venlafaxine and CKD (chronic kidney disease), stage III)  Recommend adding TDD to sig    [facility] action(s):  Defer              Medication Therapy Plan: ADJUSTMENT suggestion: TDD added, as adherence demonstrates 2 out of the last three fills patient ended up having to make 2 pharmacy trips. Placing TDD in sig allows filling tech and reviewing pharmacist a chance to catch problem and remedy.    Appointments  past 12m or future 3m with PCP    Date Provider   Last Visit   8/23/2022 Shana Jade MD   Next Visit   3/1/2023 Shana Jade MD   ED visits in past 90 days: 0        Note composed:10:45 AM 02/11/2023

## 2023-02-12 RX ORDER — VENLAFAXINE HYDROCHLORIDE 37.5 MG/1
37.5 CAPSULE, EXTENDED RELEASE ORAL DAILY
Qty: 90 CAPSULE | Refills: 1 | Status: SHIPPED | OUTPATIENT
Start: 2023-02-12 | End: 2023-08-01

## 2023-02-12 RX ORDER — VENLAFAXINE HYDROCHLORIDE 150 MG/1
150 CAPSULE, EXTENDED RELEASE ORAL NIGHTLY
Qty: 90 CAPSULE | Refills: 1 | Status: SHIPPED | OUTPATIENT
Start: 2023-02-12 | End: 2023-08-01

## 2023-02-26 NOTE — PROGRESS NOTES
[facility] Note    Chief Complaint      Chief Complaint   Patient presents with    Hypertension       History of Present Illness      Bree Gonzales is a 62 y.o. F with HTN, Hypothyroidism, Depression, Anxiety, FM, OA, and Osteoporosis, Anemia of CKD III, HLD, Vit D def, FM, Peripheral neuropathy, Morbid Obesity, and fam h/o Breast Ca presents to fu chronic issues. Last visit - 8/23/22.     H/o Melanoma - Fu by Derm. Doing well. Had resection of lesion to Left cheek 6/17/21. Stable.      HTN - Pt controlled on HCTZ 12.5 mg/d, Toprol 50 mg/d, and Losartan 100 mg/d     Hypothyroidism - Pt on Brand Name Synthroid 200 mcg/d. Pt is s/p Total Thyroidectomy due to thyroid Ca. Fu by Dr. Iam Haile.      Depression - Pt on Venlafaxine  mg +37.5 mg/d. She recently moved and cares for her elderly Mom. Son recently bought a home and has anew GF and got a new job at [employer] and will be moving closer to home in 6 mos. Stable. No further crying spells.      Anxiety -  Pt on Venlafaxine  mg +37.5 mg/d. Her mom has dementia. Stable       Anemia of CKD III - Fu by Dr. Sarmiento.        CKD III -  Fu by Dr. Sarmiento.  Pt could not afford Farxiga 10 mg/d because insurance would not cover it. Stable.      HLD - Pt started atorvastatin 20 mg QHS in Feb. She c/o flatulence since starting this. She does not feel we need to stop the medication or try an alternate medication.  She will alert me if this changes.  In meantime she can try some Gas-X     Vit D def - Pt is on a MVI and Ca+D.      OA - Fu by Rheum, Dr. Grimes.      Osteoporosis - Pt on Prolia. Last inj was in Dec. Knuckles hurt. Can try Voltaren gel.      FM - Pt on Lyrica 25 mg QAm and 2 tabs QHS.       Peripheral neuropathy - Foot pain - She c/o numbness and throbbing at night. Wearing socks help.  Cymbalta 20 mg/d no help. Pt is on Lyrica 50mg QAm and 2 tabs QHS.     Morbid Obesity - BMI -42.74b Down 8 lb. She has an indoor bike and has been using  it 21 miuntes 3 times/wk. No change in po intake or activity.  Pt reports a recent normal HA1c. Pt tries to limit carbs. She does not exercise.  Rec goal 150 min/wk. She had recent TFT's with Dr. Vitale.      fam h/o Breast Ca - Pt is BRCA neg. Due for MGM in Apr.      H/o Malignant Hyperthermia -s/p anesthesia     Lab review:   6/8/22 - BUN/Cr - 32/1.21 GFR 49  3/17/22 - BUN/Cr - 38/1.19 - GFR - 50;  H/H - 11.2/36; iPTH - 81; Fe - 68; TIBC 490 -elev; %Sat - 14%; Vit D - 52  12/6/21 - BUN/Cr - 33/1.10; H/H - 12/37.4.     HCM - Flu - 9/13/22;  Tdap - 5/2/14;  PCV 13 - none;  PVX 23 - 1/25/17?;  Shingrix - 8/26/20 and #2 - 11/11/21;  Zostavax - 2/24/15; COVID - 19 Vaccine (Pfiizer)  #1 2/26/21; #2 3/19/21; #3 - 10/26/21; # 4 8/1/22; # 5 ; MGM - 4/18/22 - repeat 1 yr;  DEXA - 11/2020 - +Osteoporosis;  PAP - ?; Hep C Screen - 12/6/21 - neg. ;  HIV Screen - 12/6/21 - neg. ; C-scope - 6/7/22- Polyps, hemorrhoids - repeat 3 yrs;   Prev PCP - Dr. Duckworth;  Rheum - Dr. Grimes; Renal - Dr. Sarmiento; Derm - [facility]; Hand Sx - Dr. Quiroz; Ob/GYN - NP - Quiana Sotomayor; Endo - Dr. Vitale; Ophtho - Dr. Kennedy; GI - Dr. Bansal; Well visit - 2/22/22    Patient Care Team:  Shana Jade MD as PCP - General (Internal Medicine)  Amber Sotomayor MA as Care Coordinator  Praneeth Bansal MD as Consulting Physician (Gastroenterology)     Health Maintenance:  Immunization History   Administered Date(s) Administered    COVID-19, MRNA, LN-S, PF (Pfizer) (Gray Cap) 07/31/2022    COVID-19, MRNA, LN-S, PF (Pfizer) (Purple Cap) 02/26/2021, 03/19/2021, 10/26/2021, 08/01/2022    Influenza - Quadrivalent 10/03/2019    Influenza - Quadrivalent - MDCK - PF 08/26/2020, 09/13/2022    Influenza - Quadrivalent - PF *Preferred* (6 months and older) 10/03/2018, 10/12/2021    Pneumococcal Polysaccharide - 23 Valent 01/25/2017    Tdap 04/30/2014, 05/02/2014    Zoster 02/24/2015, 08/26/2020, 11/11/2021    Zoster Recombinant 08/26/2020,  11/11/2021      Health Maintenance   Topic Date Due    Mammogram  04/18/2023    TETANUS VACCINE  05/02/2024    Lipid Panel  08/25/2027    Hepatitis C Screening  Completed        Past Medical History:  Past Medical History:   Diagnosis Date    BRCA1 negative     BRCA2 negative     CKD (chronic kidney disease), stage III 10/12/2021    Family history of breast cancer     Fibromyalgia     Hypertension     Hypothyroidism     Malignant hyperthermia due to anesthesia     Osteoporosis     Skin cancer 10/12/2021    Thyroid cancer 10/12/2021       Past Surgical History:   has a past surgical history that includes Cholecystectomy (05/1981); Total thyroidectomy (03/2002); Skin cancer excision; Dilation and curettage of uterus; Trigger finger release (Right, 11/2020); and Cataract extraction, bilateral.    Family History:  family history includes Breast cancer in her maternal grandmother; Breast cancer (age of onset: 51) in her sister; Breast cancer (age of onset: 74) in her mother; Dementia in her mother.     Social History:  Social History     Tobacco Use    Smoking status: Never    Smokeless tobacco: Never   Substance Use Topics    Alcohol use: Never    Drug use: Never       Review of Systems   Constitutional:  Negative for chills, diaphoresis and fever.   HENT:  Negative for hearing loss.    Eyes:  Negative for visual disturbance.   Respiratory:  Negative for chest tightness, shortness of breath and wheezing.    Cardiovascular:  Negative for chest pain and palpitations.   Gastrointestinal:  Negative for blood in stool, constipation, diarrhea, nausea and vomiting.   Endocrine: Positive for heat intolerance. Negative for cold intolerance and polydipsia.   Genitourinary:  Negative for bladder incontinence, dysuria and frequency.   Musculoskeletal:  Positive for arthralgias. Negative for myalgias.        Sadiq hips, knees, hands.   Neurological:  Positive for numbness.        In toes   Psychiatric/Behavioral:  Negative for  "dysphoric mood. The patient is not nervous/anxious.       Medications:    Current Outpatient Medications:     calcium-vitamin D3 (OS-MICH 500 + D3) 500 mg-5 mcg (200 unit) per tablet, Take 1 tablet by mouth., Disp: , Rfl:     cetirizine 10 mg Cap, Take by mouth., Disp: , Rfl:     cyanocobalamin, vitamin B-12, 5,000 mcg Cap, Take by mouth., Disp: , Rfl:     pregabalin (LYRICA) 50 MG capsule, Take 50 mg by mouth 3 (three) times daily., Disp: , Rfl:     SYNTHROID 200 mcg tablet, Take one tablet 6 days a week and 1/2 tablet on Weds, Disp: , Rfl:     venlafaxine (EFFEXOR-XR) 150 MG Cp24, Take 1 capsule (150 mg total) by mouth every evening. With 37.5mg capsule. Total daily dose 187.5mg, Disp: 90 capsule, Rfl: 1    venlafaxine (EFFEXOR-XR) 37.5 MG 24 hr capsule, Take 1 capsule (37.5 mg total) by mouth once daily. With 150mg. Total daily dose 187.5mg, Disp: 90 capsule, Rfl: 1    atorvastatin (LIPITOR) 20 MG tablet, Take 1 tablet (20 mg total) by mouth once daily., Disp: 30 tablet, Rfl: 5    denosumab (PROLIA) 60 mg/mL Syrg, Inject 1 mL (60 mg total) into the skin every 6 (six) months., Disp: , Rfl:     hydroCHLOROthiazide (HYDRODIURIL) 12.5 MG Tab, Take 1 tablet (12.5 mg total) by mouth once daily., Disp: 30 tablet, Rfl: 5    losartan (COZAAR) 100 MG tablet, 1 po daily, Disp: 30 tablet, Rfl: 5    metoprolol succinate (TOPROL-XL) 50 MG 24 hr tablet, Take 1 tablet (50 mg total) by mouth once daily., Disp: 30 tablet, Rfl: 5     Allergies:  Review of patient's allergies indicates:   Allergen Reactions    Nsaids (non-steroidal anti-inflammatory drug)     Neosporin (neomycin-polymyx) Rash       Physical Exam      Vital Signs  Temp: 98 °F (36.7 °C)  Pulse: 85  Resp: 18  SpO2: 96 %  BP: 126/88  Pain Score: 0-No pain  Height and Weight  Height: 5' 2" (157.5 cm)  Weight: 106 kg (233 lb 11.2 oz)  BSA (Calculated - sq m): 2.15 sq meters  BMI (Calculated): 42.7  Weight in (lb) to have BMI = 25: 136.4             Physical Exam  Vitals " reviewed.   Constitutional:       General: She is not in acute distress.     Appearance: Normal appearance. She is obese. She is not ill-appearing, toxic-appearing or diaphoretic.   HENT:      Head: Normocephalic and atraumatic.      Right Ear: Tympanic membrane normal.      Left Ear: Tympanic membrane normal.   Eyes:      Extraocular Movements: Extraocular movements intact.      Conjunctiva/sclera: Conjunctivae normal.      Pupils: Pupils are equal, round, and reactive to light.   Neck:      Vascular: No carotid bruit.   Cardiovascular:      Rate and Rhythm: Normal rate and regular rhythm.      Pulses: Normal pulses.      Heart sounds: Normal heart sounds.   Pulmonary:      Effort: Pulmonary effort is normal. No respiratory distress.      Breath sounds: Normal breath sounds.   Abdominal:      General: Bowel sounds are normal. There is no distension.      Palpations: Abdomen is soft.      Tenderness: There is no abdominal tenderness. There is no guarding or rebound.   Musculoskeletal:      Cervical back: Neck supple. No tenderness.   Neurological:      General: No focal deficit present.      Mental Status: She is alert and oriented to person, place, and time.   Psychiatric:         Mood and Affect: Mood normal.         Behavior: Behavior normal.        Laboratory:  CBC:  Recent Labs   Lab 11/14/22  1034   WBC 7.52   RBC 4.33   Hemoglobin 12.4   Hematocrit 38.7   Platelets 275   MCV 89   MCH 28.6   MCHC 32.0       CMP:  Recent Labs   Lab 08/25/22  0855 11/14/22  1034   Glucose 102 H 89   Calcium 9.7 10.5   Albumin 4.1 3.6   Total Protein 6.8  --    Sodium 141 139   Potassium 3.6 4.0   CO2 31 30 H   Chloride 100 99   BUN 37 H 36 H   Creatinine 1.20 H 1.1   ALT 17  --    AST 18  --    Total Bilirubin 0.5  --        LIPIDS:  Recent Labs   Lab 02/22/22  1009 08/25/22  0855   HDL 57 60   Cholesterol 240 H 185   Triglycerides 210 H 178 H   LDL Cholesterol 148 H 98   HDL/Cholesterol Ratio 4.2 3.1   Non HDL Chol. (LDL+VLDL)  183 H 125         Recent Labs   Lab 12/06/21  0922   Hepatitis C Ab NON-REACTIVE       Assessment/Plan     Bree Gonzales is a 62 y.o.female with:    Normal physical exam, routine  -     Hemoglobin A1C; Future; Expected date: 06/16/2023  -     TSH; Future; Expected date: 06/16/2023  -     T4, Free; Future; Expected date: 06/16/2023  - Performed today.  Will check Basic labs.       Hypertension, unspecified type  -     metoprolol succinate (TOPROL-XL) 50 MG 24 hr tablet; Take 1 tablet (50 mg total) by mouth once daily.  Dispense: 30 tablet; Refill: 5  -     losartan (COZAAR) 100 MG tablet; 1 po daily  Dispense: 30 tablet; Refill: 5  -     hydroCHLOROthiazide (HYDRODIURIL) 12.5 MG Tab; Take 1 tablet (12.5 mg total) by mouth once daily.  Dispense: 30 tablet; Refill: 5  - Controlled.  Cont current.     Hypercholesterolemia  -     atorvastatin (LIPITOR) 20 MG tablet; Take 1 tablet (20 mg total) by mouth once daily.  Dispense: 30 tablet; Refill: 5  -     Lipid Panel; Future; Expected date: 06/16/2023  nasal stuffiness    Stage 3a chronic kidney disease  - Stable.  Cont current regimen.    Morbid obesity with BMI of 40.0-44.9, adult  - Rec diet and exercise as discussed for wt loss.      Depressive disorder  - Stable.  Cont current regimen.    Anxiety  - Stable.  Cont current regimen.    Idiopathic peripheral neuropathy  - Stable.  Cont current regimen.    Osteoporosis, unspecified osteoporosis type, unspecified pathological fracture presence  - Stable.  Cont current regimen. Fu by Dr. Grimes    Anemia in stage 3 chronic kidney disease, unspecified whether stage 3a or 3b CKD  - Pt donates blood often. She will try taking Iron 2- 3 times/wk as she was told recently she was slightly iron def per     Fibromyalgia  - Stable.  Cont current regimen.    Screening mammogram, encounter for  -     Mammo Digital Screening Bilat w/ Dong; Future; Expected date: 04/19/2023      Chronic conditions status updated as per HPI.  Other than  changes above, cont current medications and maintain follow up with specialists.  Follow up in about 6 months (around 9/1/2023).      Shana Jade MD  [facility]

## 2023-03-01 ENCOUNTER — OFFICE VISIT (OUTPATIENT)
Dept: PRIMARY CARE CLINIC | Facility: CLINIC | Age: 63
End: 2023-03-01
Payer: COMMERCIAL

## 2023-03-01 VITALS
BODY MASS INDEX: 43 KG/M2 | TEMPERATURE: 98 F | DIASTOLIC BLOOD PRESSURE: 88 MMHG | RESPIRATION RATE: 18 BRPM | OXYGEN SATURATION: 96 % | HEART RATE: 85 BPM | HEIGHT: 62 IN | WEIGHT: 233.69 LBS | SYSTOLIC BLOOD PRESSURE: 126 MMHG

## 2023-03-01 DIAGNOSIS — M81.0 OSTEOPOROSIS, UNSPECIFIED OSTEOPOROSIS TYPE, UNSPECIFIED PATHOLOGICAL FRACTURE PRESENCE: ICD-10-CM

## 2023-03-01 DIAGNOSIS — Z12.31 SCREENING MAMMOGRAM, ENCOUNTER FOR: ICD-10-CM

## 2023-03-01 DIAGNOSIS — F32.A DEPRESSIVE DISORDER: ICD-10-CM

## 2023-03-01 DIAGNOSIS — G60.9 IDIOPATHIC PERIPHERAL NEUROPATHY: ICD-10-CM

## 2023-03-01 DIAGNOSIS — Z00.00 NORMAL PHYSICAL EXAM, ROUTINE: Primary | ICD-10-CM

## 2023-03-01 DIAGNOSIS — E78.00 HYPERCHOLESTEROLEMIA: ICD-10-CM

## 2023-03-01 DIAGNOSIS — I10 HYPERTENSION, UNSPECIFIED TYPE: ICD-10-CM

## 2023-03-01 DIAGNOSIS — M79.7 FIBROMYALGIA: ICD-10-CM

## 2023-03-01 DIAGNOSIS — N18.31 STAGE 3A CHRONIC KIDNEY DISEASE: ICD-10-CM

## 2023-03-01 DIAGNOSIS — F41.9 ANXIETY: ICD-10-CM

## 2023-03-01 DIAGNOSIS — D63.1 ANEMIA IN STAGE 3 CHRONIC KIDNEY DISEASE, UNSPECIFIED WHETHER STAGE 3A OR 3B CKD: ICD-10-CM

## 2023-03-01 DIAGNOSIS — E66.01 MORBID OBESITY WITH BMI OF 40.0-44.9, ADULT: ICD-10-CM

## 2023-03-01 DIAGNOSIS — N18.30 ANEMIA IN STAGE 3 CHRONIC KIDNEY DISEASE, UNSPECIFIED WHETHER STAGE 3A OR 3B CKD: ICD-10-CM

## 2023-03-01 PROCEDURE — 4010F ACE/ARB THERAPY RXD/TAKEN: CPT | Mod: CPTII,S$GLB,, | Performed by: INTERNAL MEDICINE

## 2023-03-01 PROCEDURE — 1159F PR MEDICATION LIST DOCUMENTED IN MEDICAL RECORD: ICD-10-PCS | Mod: CPTII,S$GLB,, | Performed by: INTERNAL MEDICINE

## 2023-03-01 PROCEDURE — 3079F DIAST BP 80-89 MM HG: CPT | Mod: CPTII,S$GLB,, | Performed by: INTERNAL MEDICINE

## 2023-03-01 PROCEDURE — 99396 PREV VISIT EST AGE 40-64: CPT | Mod: S$GLB,,, | Performed by: INTERNAL MEDICINE

## 2023-03-01 PROCEDURE — 3008F PR BODY MASS INDEX (BMI) DOCUMENTED: ICD-10-PCS | Mod: CPTII,S$GLB,, | Performed by: INTERNAL MEDICINE

## 2023-03-01 PROCEDURE — 3074F PR MOST RECENT SYSTOLIC BLOOD PRESSURE < 130 MM HG: ICD-10-PCS | Mod: CPTII,S$GLB,, | Performed by: INTERNAL MEDICINE

## 2023-03-01 PROCEDURE — 1159F MED LIST DOCD IN RCRD: CPT | Mod: CPTII,S$GLB,, | Performed by: INTERNAL MEDICINE

## 2023-03-01 PROCEDURE — 3074F SYST BP LT 130 MM HG: CPT | Mod: CPTII,S$GLB,, | Performed by: INTERNAL MEDICINE

## 2023-03-01 PROCEDURE — 4010F PR ACE/ARB THEARPY RXD/TAKEN: ICD-10-PCS | Mod: CPTII,S$GLB,, | Performed by: INTERNAL MEDICINE

## 2023-03-01 PROCEDURE — 3079F PR MOST RECENT DIASTOLIC BLOOD PRESSURE 80-89 MM HG: ICD-10-PCS | Mod: CPTII,S$GLB,, | Performed by: INTERNAL MEDICINE

## 2023-03-01 PROCEDURE — 99999 PR PBB SHADOW E&M-EST. PATIENT-LVL V: ICD-10-PCS | Mod: PBBFAC,,, | Performed by: INTERNAL MEDICINE

## 2023-03-01 PROCEDURE — 3008F BODY MASS INDEX DOCD: CPT | Mod: CPTII,S$GLB,, | Performed by: INTERNAL MEDICINE

## 2023-03-01 PROCEDURE — 99396 PR PREVENTIVE VISIT,EST,40-64: ICD-10-PCS | Mod: S$GLB,,, | Performed by: INTERNAL MEDICINE

## 2023-03-01 PROCEDURE — 99999 PR PBB SHADOW E&M-EST. PATIENT-LVL V: CPT | Mod: PBBFAC,,, | Performed by: INTERNAL MEDICINE

## 2023-03-01 RX ORDER — PREGABALIN 50 MG/1
50 CAPSULE ORAL 3 TIMES DAILY
COMMUNITY
Start: 2023-02-09

## 2023-03-01 RX ORDER — ATORVASTATIN CALCIUM 20 MG/1
20 TABLET, FILM COATED ORAL DAILY
Qty: 30 TABLET | Refills: 5 | Status: SHIPPED | OUTPATIENT
Start: 2023-03-01 | End: 2023-09-11 | Stop reason: SDUPTHER

## 2023-03-01 RX ORDER — LOSARTAN POTASSIUM 100 MG/1
TABLET ORAL
Qty: 30 TABLET | Refills: 5 | Status: SHIPPED | OUTPATIENT
Start: 2023-03-01 | End: 2023-09-11 | Stop reason: SDUPTHER

## 2023-03-01 RX ORDER — METOPROLOL SUCCINATE 50 MG/1
50 TABLET, EXTENDED RELEASE ORAL DAILY
Qty: 30 TABLET | Refills: 5 | Status: SHIPPED | OUTPATIENT
Start: 2023-03-01 | End: 2023-07-21

## 2023-03-01 RX ORDER — HYDROCHLOROTHIAZIDE 12.5 MG/1
12.5 TABLET ORAL DAILY
Qty: 30 TABLET | Refills: 5 | Status: SHIPPED | OUTPATIENT
Start: 2023-03-01 | End: 2023-09-11 | Stop reason: SDUPTHER

## 2023-05-09 ENCOUNTER — HOSPITAL ENCOUNTER (OUTPATIENT)
Dept: RADIOLOGY | Facility: HOSPITAL | Age: 63
Discharge: HOME | End: 2023-05-09
Attending: INTERNAL MEDICINE
Payer: COMMERCIAL

## 2023-05-09 VITALS — BODY MASS INDEX: 42.88 KG/M2 | HEIGHT: 62 IN | WEIGHT: 233 LBS

## 2023-05-09 DIAGNOSIS — Z12.31 SCREENING MAMMOGRAM, ENCOUNTER FOR: ICD-10-CM

## 2023-05-09 PROCEDURE — 77067 SCR MAMMO BI INCL CAD: CPT | Mod: TC

## 2023-05-09 PROCEDURE — 77063 MAMMO DIGITAL SCREENING BILAT WITH TOMO: ICD-10-PCS | Mod: 26,,, | Performed by: RADIOLOGY

## 2023-05-09 PROCEDURE — 77067 MAMMO DIGITAL SCREENING BILAT WITH TOMO: ICD-10-PCS | Mod: 26,,, | Performed by: RADIOLOGY

## 2023-05-09 PROCEDURE — 77067 SCR MAMMO BI INCL CAD: CPT | Mod: 26,,, | Performed by: RADIOLOGY

## 2023-05-09 PROCEDURE — 77063 BREAST TOMOSYNTHESIS BI: CPT | Mod: 26,,, | Performed by: RADIOLOGY

## 2023-05-16 ENCOUNTER — TELEPHONE (OUTPATIENT)
Dept: SPORTS MEDICINE | Facility: CLINIC | Age: 63
End: 2023-05-16
Payer: COMMERCIAL

## 2023-05-16 NOTE — TELEPHONE ENCOUNTER
called and spoke to patient and scheduled appt for 5/18/23. Patient confirmed----- Message from Fabian Fuchs sent at 5/16/2023  4:15 PM CDT -----  Regarding: Missed Call for Appointment  Contact: [phone]  Calling in regards to speaking with Lacy to schedule an appt. Please call back as soon as possible.

## 2023-05-16 NOTE — PROGRESS NOTES
I sent pt a my chart message -  I reviewed your Mammogram -   The breasts have scattered areas of fibroglandular density. There is no evidence of suspicious masses, microcalcifications or architectural distortion.  Impression:   No mammographic evidence of malignancy.  Routine screening mammogram in 1 year is recommended.  Dr. ROGERS

## 2023-05-18 ENCOUNTER — OFFICE VISIT (OUTPATIENT)
Dept: SPORTS MEDICINE | Facility: CLINIC | Age: 63
End: 2023-05-18
Payer: COMMERCIAL

## 2023-05-18 ENCOUNTER — HOSPITAL ENCOUNTER (OUTPATIENT)
Dept: RADIOLOGY | Facility: HOSPITAL | Age: 63
Discharge: HOME | End: 2023-05-18
Attending: PHYSICIAN ASSISTANT
Payer: COMMERCIAL

## 2023-05-18 VITALS
WEIGHT: 241 LBS | DIASTOLIC BLOOD PRESSURE: 85 MMHG | SYSTOLIC BLOOD PRESSURE: 135 MMHG | BODY MASS INDEX: 44.35 KG/M2 | HEART RATE: 100 BPM | HEIGHT: 62 IN

## 2023-05-18 DIAGNOSIS — M25.562 ACUTE PAIN OF LEFT KNEE: Primary | ICD-10-CM

## 2023-05-18 DIAGNOSIS — M17.12 PRIMARY OSTEOARTHRITIS OF LEFT KNEE: ICD-10-CM

## 2023-05-18 DIAGNOSIS — M25.562 ACUTE PAIN OF LEFT KNEE: ICD-10-CM

## 2023-05-18 PROCEDURE — 73564 X-RAY EXAM KNEE 4 OR MORE: CPT | Mod: TC,50

## 2023-05-18 PROCEDURE — 1159F PR MEDICATION LIST DOCUMENTED IN MEDICAL RECORD: ICD-10-PCS | Mod: CPTII,S$GLB,, | Performed by: PHYSICIAN ASSISTANT

## 2023-05-18 PROCEDURE — 1160F RVW MEDS BY RX/DR IN RCRD: CPT | Mod: CPTII,S$GLB,, | Performed by: PHYSICIAN ASSISTANT

## 2023-05-18 PROCEDURE — 3008F BODY MASS INDEX DOCD: CPT | Mod: CPTII,S$GLB,, | Performed by: PHYSICIAN ASSISTANT

## 2023-05-18 PROCEDURE — 20610 DRAIN/INJ JOINT/BURSA W/O US: CPT | Mod: LT,S$GLB,, | Performed by: PHYSICIAN ASSISTANT

## 2023-05-18 PROCEDURE — 3075F PR MOST RECENT SYSTOLIC BLOOD PRESS GE 130-139MM HG: ICD-10-PCS | Mod: CPTII,S$GLB,, | Performed by: PHYSICIAN ASSISTANT

## 2023-05-18 PROCEDURE — 99214 PR OFFICE/OUTPT VISIT, EST, LEVL IV, 30-39 MIN: ICD-10-PCS | Mod: 25,S$GLB,, | Performed by: PHYSICIAN ASSISTANT

## 2023-05-18 PROCEDURE — 73564 XR KNEE ORTHO BILAT WITH FLEXION: ICD-10-PCS | Mod: 26,50,, | Performed by: RADIOLOGY

## 2023-05-18 PROCEDURE — 4010F ACE/ARB THERAPY RXD/TAKEN: CPT | Mod: CPTII,S$GLB,, | Performed by: PHYSICIAN ASSISTANT

## 2023-05-18 PROCEDURE — 3079F DIAST BP 80-89 MM HG: CPT | Mod: CPTII,S$GLB,, | Performed by: PHYSICIAN ASSISTANT

## 2023-05-18 PROCEDURE — 73564 X-RAY EXAM KNEE 4 OR MORE: CPT | Mod: 26,50,, | Performed by: RADIOLOGY

## 2023-05-18 PROCEDURE — 3079F PR MOST RECENT DIASTOLIC BLOOD PRESSURE 80-89 MM HG: ICD-10-PCS | Mod: CPTII,S$GLB,, | Performed by: PHYSICIAN ASSISTANT

## 2023-05-18 PROCEDURE — 99999 PR PBB SHADOW E&M-EST. PATIENT-LVL IV: ICD-10-PCS | Mod: PBBFAC,,, | Performed by: PHYSICIAN ASSISTANT

## 2023-05-18 PROCEDURE — 20610 LARGE JOINT ASPIRATION/INJECTION: L KNEE: ICD-10-PCS | Mod: LT,S$GLB,, | Performed by: PHYSICIAN ASSISTANT

## 2023-05-18 PROCEDURE — 99214 OFFICE O/P EST MOD 30 MIN: CPT | Mod: 25,S$GLB,, | Performed by: PHYSICIAN ASSISTANT

## 2023-05-18 PROCEDURE — 99999 PR PBB SHADOW E&M-EST. PATIENT-LVL IV: CPT | Mod: PBBFAC,,, | Performed by: PHYSICIAN ASSISTANT

## 2023-05-18 PROCEDURE — 1159F MED LIST DOCD IN RCRD: CPT | Mod: CPTII,S$GLB,, | Performed by: PHYSICIAN ASSISTANT

## 2023-05-18 PROCEDURE — 1160F PR REVIEW ALL MEDS BY PRESCRIBER/CLIN PHARMACIST DOCUMENTED: ICD-10-PCS | Mod: CPTII,S$GLB,, | Performed by: PHYSICIAN ASSISTANT

## 2023-05-18 PROCEDURE — 4010F PR ACE/ARB THEARPY RXD/TAKEN: ICD-10-PCS | Mod: CPTII,S$GLB,, | Performed by: PHYSICIAN ASSISTANT

## 2023-05-18 PROCEDURE — 3008F PR BODY MASS INDEX (BMI) DOCUMENTED: ICD-10-PCS | Mod: CPTII,S$GLB,, | Performed by: PHYSICIAN ASSISTANT

## 2023-05-18 PROCEDURE — 3075F SYST BP GE 130 - 139MM HG: CPT | Mod: CPTII,S$GLB,, | Performed by: PHYSICIAN ASSISTANT

## 2023-05-18 RX ADMIN — TRIAMCINOLONE ACETONIDE 40 MG: 40 INJECTION, SUSPENSION INTRA-ARTICULAR; INTRAMUSCULAR at 09:05

## 2023-05-18 NOTE — PROCEDURES
Large Joint Aspiration/Injection: L knee    Date/Time: 5/18/2023 9:00 AM  Performed by: Chao Castillo PA-C  Authorized by: Chao Castillo PA-C     Consent Done?:  Yes (Verbal)  Indications:  Pain  Site marked: the procedure site was marked    Timeout: prior to procedure the correct patient, procedure, and site was verified    Prep: patient was prepped and draped in usual sterile fashion    Local anesthesia used?: No      Details:  Needle Size:  22 G  Ultrasonic Guidance for needle placement?: No    Approach:  Anterolateral  Location:  Knee  Site:  L knee  Medications:  40 mg triamcinolone acetonide 40 mg/mL  Patient tolerance:  Patient tolerated the procedure well with no immediate complications    Injection Procedure  A time out was performed, including verification of patient ID, procedure, site and side, availability of information and equipment, review of safety issues, and agreement with consent, the procedure site was marked.    After time out was performed, the patient was prepped aseptically with povidone-iodine swabsticks. A diagnostic and therapeutic injection of 1:3cc Kenalog/Marcaine was given under sterile technique using a 22g x 1.5 needle from the anterolateral aspect of the left Knee Joint in the sitting position.      Bree Gonzales had no adverse reactions to the medication. Pain decreased. She was instructed to apply ice to the joint for 20 minutes and avoid strenuous activities for 24-36 hours following the injection. She was warned of possible blood sugar and/or blood pressure changes during that time. Following that time, she can resume regular activities.    She was reminded to call the clinic immediately for any adverse side effects as explained in clinic today.

## 2023-05-18 NOTE — PROGRESS NOTES
CC: Left knee pain    Patient is a 52-year-old female who presents today for initial evaluation of left knee pain.  She has been seen by me before for her left shoulder.  Patient reports that she began experiencing acute left knee pain a little over a week ago.  She is unaware of any recent falls, injuries, or trauma to the left knee that may have led to this.  She localizes the pain to the medial aspect of the left knee without radiation.  She describes it as a sharp, stabbing pain that is worse with bearing weight, bending the knee, going up and down stairs, and squatting.  She feels that the left knee has been somewhat swollen since this began.  She denies any subjective instability or mechanical grinding/catching in the left knee.  She sees a rheumatologist for osteoporosis and is on Prolia, but she denies any other rheumatologic issues.  Due to her history of CKD, she is unable to take oral anti-inflammatories, and has been icing the knee and taking Tylenol for pain with limited relief.  No prior injuries or surgeries on her left knee.    - mechanical symptoms, - instability    Is affecting ADLs.  Pain is 5/10 at it's worst.    REVIEW OF SYSTEMS:  Constitution: Negative. Negative for chills, fever and night sweats.   HENT: Negative for congestion and headaches.    Eyes: Negative for blurred vision, left vision loss and right vision loss.   Cardiovascular: Negative for chest pain and syncope.   Respiratory: Negative for cough and shortness of breath.    Endocrine: Negative for polydipsia, polyphagia and polyuria.   Hematologic/Lymphatic: Negative for bleeding problem. Does not bruise/bleed easily.   Skin: Negative for dry skin, itching and rash.   Musculoskeletal: Negative for falls. Positive for left knee pain and  muscle weakness.   Gastrointestinal: Negative for abdominal pain and bowel incontinence.   Genitourinary: Negative for bladder incontinence and nocturia.   Neurological: Negative for disturbances in  coordination, loss of balance and seizures.   Psychiatric/Behavioral: Negative for depression. The patient does not have insomnia.    Allergic/Immunologic: Negative for hives and persistent infections.     PAST MEDICAL HISTORY:    Past Medical History:   Diagnosis Date    BRCA1 negative     BRCA2 negative     CKD (chronic kidney disease), stage III 10/12/2021    Family history of breast cancer     Fibromyalgia     Hypertension     Hypothyroidism     Malignant hyperthermia due to anesthesia     Osteoporosis     Skin cancer 10/12/2021    Thyroid cancer 10/12/2021       PAST SURGICAL HISTORY:   Past Surgical History:   Procedure Laterality Date    CATARACT EXTRACTION, BILATERAL      CHOLECYSTECTOMY  05/1981    DILATION AND CURETTAGE OF UTERUS      SKIN CANCER EXCISION      TOTAL THYROIDECTOMY  03/2002    TRIGGER FINGER RELEASE Right 11/2020       FAMILY HISTORY:   Family History   Problem Relation Age of Onset    Breast cancer Mother 74    Dementia Mother     Breast cancer Sister 51    Breast cancer Maternal Grandmother     Colon cancer Neg Hx     Ovarian cancer Neg Hx        SOCIAL HISTORY:   Social History     Socioeconomic History    Marital status:    Tobacco Use    Smoking status: Never    Smokeless tobacco: Never   Substance and Sexual Activity    Alcohol use: Never    Drug use: Never    Sexual activity: Yes     Partners: Male     Comment:        MEDICATIONS:     Current Outpatient Medications:     atorvastatin (LIPITOR) 20 MG tablet, Take 1 tablet (20 mg total) by mouth once daily., Disp: 30 tablet, Rfl: 5    calcium-vitamin D3 (OS-MICH 500 + D3) 500 mg-5 mcg (200 unit) per tablet, Take 1 tablet by mouth., Disp: , Rfl:     cetirizine 10 mg Cap, Take by mouth., Disp: , Rfl:     hydroCHLOROthiazide (HYDRODIURIL) 12.5 MG Tab, Take 1 tablet (12.5 mg total) by mouth once daily., Disp: 30 tablet, Rfl: 5    losartan (COZAAR) 100 MG tablet, 1 po daily, Disp: 30 tablet, Rfl: 5    metoprolol succinate  "(TOPROL-XL) 50 MG 24 hr tablet, Take 1 tablet (50 mg total) by mouth once daily., Disp: 30 tablet, Rfl: 5    pregabalin (LYRICA) 50 MG capsule, Take 50 mg by mouth 3 (three) times daily., Disp: , Rfl:     SYNTHROID 200 mcg tablet, Take one tablet 6 days a week and 1/2 tablet on Weds, Disp: , Rfl:     venlafaxine (EFFEXOR-XR) 150 MG Cp24, Take 1 capsule (150 mg total) by mouth every evening. With 37.5mg capsule. Total daily dose 187.5mg, Disp: 90 capsule, Rfl: 1    venlafaxine (EFFEXOR-XR) 37.5 MG 24 hr capsule, Take 1 capsule (37.5 mg total) by mouth once daily. With 150mg. Total daily dose 187.5mg, Disp: 90 capsule, Rfl: 1    cyanocobalamin, vitamin B-12, 5,000 mcg Cap, Take by mouth., Disp: , Rfl:     denosumab (PROLIA) 60 mg/mL Syrg, Inject 1 mL (60 mg total) into the skin every 6 (six) months., Disp: , Rfl:     ALLERGIES:   Review of patient's allergies indicates:   Allergen Reactions    Nsaids (non-steroidal anti-inflammatory drug)     Neosporin (neomycin-polymyx) Rash       VITAL SIGNS:   /85   Pulse 100   Ht 5' 2" (1.575 m)   Wt 109.3 kg (241 lb)   BMI 44.08 kg/m²      PHYSICAL EXAMINATION  General:  The patient is alert and oriented x 3.  Mood is pleasant.  Observation of ears, eyes and nose reveal no gross abnormalities.  No labored breathing observed.    LEFT KNEE EXAMINATION     OBSERVATION / INSPECTION   Gait:   antalgic   Alignment:  Neutral   Scars:   None   Muscle atrophy: Mild  Effusion:  Trace   Warmth:  None   Discoloration:   none     TENDERNESS / CREPITUS (T / C):          T / C      T / C   Patella   - / -   Lateral joint line   - / -   Peripatellar medial  -  Medial joint line    + / -   Peripatellar lateral -  Medial plica   - / -   Patellar tendon -   Popliteal fossa   - / -   Quad tendon   -   Gastrocnemius   -   Prepatellar Bursa - / -   Quadricep   -   Tibial tubercle  -  Thigh/hamstring  -   Pes anserine/HS -  Fibula    -   ITB   - / -  Tibia     -   Tib/fib joint  - / " -  LCL    -     MFC   - / -   MCL: Proximal  -    LFC   - / -    Distal   +          ROM: (* = pain)  PASSIVE   ACTIVE   *limited due to patient's body habitus*   Left :   *   *     Right :        Patellofemoral examination:  See above noted areas of tenderness.   Patella position    Subluxation / dislocation: Centered           Sup. / Inf;   Normal   Crepitus (PF):    Absent   Patellar Mobility:       Medial-lateral:   Normal    Superior-inferior:  Normal    Inferior tilt   Normal    Patellar tendon:  Normal   Lateral tilt:    Normal   J-sign:     None   Patellofemoral grind:   No pain       MENISCAL SIGNS:     Pain on terminal extension:  -  Pain on terminal flexion:  +  Mary Ellens maneuver:  + (for pain)  Squat     + (for pain)    LIGAMENT EXAMINATION:  ACL / Lachman:  normal (-1 to 2mm)    PCL-Post.  drawer: normal 0 to 2mm  MCL- Valgus:  normal 0 to 2mm  LCL- Varus:  normal 0 to 2mm  Pivot shift: normal (Equal)   Dial Test: difference c/w other side   At 30° flexion: normal (< 5°)    At 90° flexion: normal (< 5°)   Reverse Pivot Shift:   normal (Equal)     STRENGTH: (* = with pain) PAINFUL SIDE   Quadricep   4/5   Hamstrin/5    EXTREMITY NEURO-VASCULAR EXAMINATION:   Sensation:  Grossly intact to light touch all dermatomal regions.   Motor Function:  Fully intact motor function at hip, knee, foot and ankle    DTRs;  quadriceps and  achilles 2+.  No clonus and downgoing Babinski.    Vascular status:  DP and PT pulses 2+, brisk capillary refill, symmetric.     OTHER FINDINGS:  N/A    X-rays bilateral knees (2023):      FINDINGS:  Bones are demineralized.  Mild narrowing at the femoral tibial joints.  Small left effusion.  May be small right effusion.  Narrowing of the patellofemoral joints.    Kellgren magy grade 2 bilaterally       ASSESSMENT:    Left knee pain, possible medial meniscus tear  Mild primary osteoarthritis of left knee    PLAN:      I made the decision to obtain old records of the patient including previous notes and imaging. New imaging was ordered today of the extremity or extremities evaluated. I independently reviewed and interpreted the radiographs and/or MRIs today as well as prior imaging, if available.    We discussed at length different treatment options including conservative vs surgical management. These include anti-inflammatories, acetaminophen, rest, ice, heat, formal physical therapy including strengthening and stretching exercises, home exercise programs, dry needling, corticosteroid versus viscosupplementation injections and finally surgical intervention.      Left knee intra-articular corticosteroid injection performed today.  See procedure note for details.    Recommend she continue to rest, ice, and elevate the left knee, he is topical analgesics, and take over-the-counter acetaminophen as needed for pain.    Follow-up in approximately 6 weeks.  Instructed patient to reach out to us in the next 1-2 weeks if she does not see adequate relief with the injection and we will order an MRI to rule out a medial meniscus tear.    All questions were answered, pt will contact us for questions or concerns in the interim.      Medical Dictation software was used during the dictation of portions or the entirety of this medical record.  Phonetic or grammatic errors may exist due to the use of this software. For clarification, refer to the author of the document.

## 2023-05-19 RX ORDER — TRIAMCINOLONE ACETONIDE 40 MG/ML
40 INJECTION, SUSPENSION INTRA-ARTICULAR; INTRAMUSCULAR
Status: DISCONTINUED | OUTPATIENT
Start: 2023-05-18 | End: 2023-05-19 | Stop reason: HOSPADM

## 2023-05-26 ENCOUNTER — OFFICE VISIT (OUTPATIENT)
Dept: OBSTETRICS AND GYNECOLOGY | Facility: CLINIC | Age: 63
End: 2023-05-26
Payer: COMMERCIAL

## 2023-05-26 VITALS
SYSTOLIC BLOOD PRESSURE: 152 MMHG | BODY MASS INDEX: 45.1 KG/M2 | WEIGHT: 246.56 LBS | DIASTOLIC BLOOD PRESSURE: 100 MMHG

## 2023-05-26 DIAGNOSIS — Z01.419 ENCOUNTER FOR WELL WOMAN EXAM WITH ROUTINE GYNECOLOGICAL EXAM: Primary | ICD-10-CM

## 2023-05-26 PROCEDURE — 99396 PR PREVENTIVE VISIT,EST,40-64: ICD-10-PCS | Mod: S$GLB,,, | Performed by: OBSTETRICS & GYNECOLOGY

## 2023-05-26 PROCEDURE — 3008F BODY MASS INDEX DOCD: CPT | Mod: CPTII,S$GLB,, | Performed by: OBSTETRICS & GYNECOLOGY

## 2023-05-26 PROCEDURE — 99396 PREV VISIT EST AGE 40-64: CPT | Mod: S$GLB,,, | Performed by: OBSTETRICS & GYNECOLOGY

## 2023-05-26 PROCEDURE — 99999 PR PBB SHADOW E&M-EST. PATIENT-LVL III: CPT | Mod: PBBFAC,,, | Performed by: OBSTETRICS & GYNECOLOGY

## 2023-05-26 PROCEDURE — 3008F PR BODY MASS INDEX (BMI) DOCUMENTED: ICD-10-PCS | Mod: CPTII,S$GLB,, | Performed by: OBSTETRICS & GYNECOLOGY

## 2023-05-26 PROCEDURE — 3077F SYST BP >= 140 MM HG: CPT | Mod: CPTII,S$GLB,, | Performed by: OBSTETRICS & GYNECOLOGY

## 2023-05-26 PROCEDURE — 4010F PR ACE/ARB THEARPY RXD/TAKEN: ICD-10-PCS | Mod: CPTII,S$GLB,, | Performed by: OBSTETRICS & GYNECOLOGY

## 2023-05-26 PROCEDURE — 1159F MED LIST DOCD IN RCRD: CPT | Mod: CPTII,S$GLB,, | Performed by: OBSTETRICS & GYNECOLOGY

## 2023-05-26 PROCEDURE — 1159F PR MEDICATION LIST DOCUMENTED IN MEDICAL RECORD: ICD-10-PCS | Mod: CPTII,S$GLB,, | Performed by: OBSTETRICS & GYNECOLOGY

## 2023-05-26 PROCEDURE — 3080F PR MOST RECENT DIASTOLIC BLOOD PRESSURE >= 90 MM HG: ICD-10-PCS | Mod: CPTII,S$GLB,, | Performed by: OBSTETRICS & GYNECOLOGY

## 2023-05-26 PROCEDURE — 4010F ACE/ARB THERAPY RXD/TAKEN: CPT | Mod: CPTII,S$GLB,, | Performed by: OBSTETRICS & GYNECOLOGY

## 2023-05-26 PROCEDURE — 99999 PR PBB SHADOW E&M-EST. PATIENT-LVL III: ICD-10-PCS | Mod: PBBFAC,,, | Performed by: OBSTETRICS & GYNECOLOGY

## 2023-05-26 PROCEDURE — 87624 HPV HI-RISK TYP POOLED RSLT: CPT | Performed by: OBSTETRICS & GYNECOLOGY

## 2023-05-26 PROCEDURE — 3077F PR MOST RECENT SYSTOLIC BLOOD PRESSURE >= 140 MM HG: ICD-10-PCS | Mod: CPTII,S$GLB,, | Performed by: OBSTETRICS & GYNECOLOGY

## 2023-05-26 PROCEDURE — 3080F DIAST BP >= 90 MM HG: CPT | Mod: CPTII,S$GLB,, | Performed by: OBSTETRICS & GYNECOLOGY

## 2023-05-26 PROCEDURE — 88175 CYTOPATH C/V AUTO FLUID REDO: CPT | Performed by: OBSTETRICS & GYNECOLOGY

## 2023-05-26 NOTE — PROGRESS NOTES
CC: Well woman exam    SUBJECTIVE:   Bree Gonzales is a 62 y.o. female  (SVDx 2) here for annual routine Pap and checkup. No LMP recorded. Patient is postmenopausal..  She has no unusual complaints.      Pt is postmenopausal and is not on HRT. She denies hot flashes or night sweats. She denies postmenopausal bleeding.   Pt is not sexually active.    History of abnormal pap: Yes - h/o cryotherapy many years ago  Last Pap: , NILM- no HPV done  Last MMG: Yes - 2023  Last Colonoscopy:  Yes, 2022- repeat in 3 years  Dexa:  osteoporosis, on fosamax. Followed by Dr. Rico     Family history of breast cancer in mom and sister. Mom was 74 and sister was 48.  Also maternal grandmother  Pt is negative for BRCA 1&2    Pt with hx of thyroid cancer s/p total thyroidectomy.  Followed by Dr. Vitale    Pt prior pt of Dr. Rojas.     Past Medical History:   Diagnosis Date    BRCA1 negative     BRCA2 negative     CKD (chronic kidney disease), stage III 10/12/2021    Family history of breast cancer     Fibromyalgia     Hypertension     Hypothyroidism     Malignant hyperthermia due to anesthesia     Osteoporosis     Skin cancer 10/12/2021    Thyroid cancer 10/12/2021     Past Surgical History:   Procedure Laterality Date    CATARACT EXTRACTION, BILATERAL      CHOLECYSTECTOMY  1981    DILATION AND CURETTAGE OF UTERUS      SKIN CANCER EXCISION      TOTAL THYROIDECTOMY  2002    TRIGGER FINGER RELEASE Right 2020     Social History     Socioeconomic History    Marital status:    Tobacco Use    Smoking status: Never    Smokeless tobacco: Never   Substance and Sexual Activity    Alcohol use: Never    Drug use: Never    Sexual activity: Yes     Partners: Male     Comment:      Family History   Problem Relation Age of Onset    Breast cancer Mother 74    Dementia Mother     Breast cancer Sister 51    Breast cancer Maternal Grandmother     Colon cancer Neg Hx     Ovarian cancer Neg Hx       OB History          2    Para   2    Term   2            AB        Living             SAB        IAB        Ectopic        Multiple        Live Births                     BP (!) 152/100   Wt 111.8 kg (246 lb 9.4 oz)   BMI 45.10 kg/m²     ROS:  GENERAL: Denies weight gain or weight loss. Feeling well overall.   SKIN: Denies rash or lesions.   HEAD: Denies head injury or headache.   NODES: Denies enlarged lymph nodes.   CHEST: Denies chest pain or shortness of breath.   CARDIOVASCULAR: Denies palpitations or left sided chest pain.   ABDOMEN: No abdominal pain, constipation, diarrhea, nausea, vomiting or rectal bleeding.   URINARY: No frequency, dysuria, hematuria, or burning on urination.  REPRODUCTIVE: See HPI.   BREASTS: The patient performs breast self-examination and denies pain, lumps, or nipple discharge.   HEMATOLOGIC: No easy bruisability or excessive bleeding.   MUSCULOSKELETAL: Denies joint pain or swelling.   NEUROLOGIC: Denies syncope or weakness.   PSYCHIATRIC: Denies depression, anxiety or mood swings.    PHYSICAL EXAM:  APPEARANCE: Well nourished, well developed, in no acute distress.  AFFECT: WNL, alert and oriented x 3  SKIN: No acne or hirsutism  NECK: Neck symmetric without masses or thyromegaly  NODES: No inguinal, cervical, axillary, or femoral lymph node enlargement  CHEST: Good respiratory effect  ABDOMEN: Soft.  No tenderness or masses.  No hepatosplenomegaly.  No hernias.  BREASTS: Symmetrical, no skin changes or visible lesions.  No palpable masses, nipple discharge bilaterally.  PELVIC: Normal external genitalia without lesions.  Normal hair distribution.  Adequate perineal body, normal urethral meatus.  Vagina mildly atrophic without lesions or discharge.  Cervix pink, without lesions, discharge or tenderness.  No significant cystocele or rectocele.  Bimanual exam shows uterus to be normal size, regular, mobile and nontender.  Adnexa without masses or tenderness.     RECTAL: Rectovaginal exam confirms above with normal sphincter tone, no masses.  EXTREMITIES: No edema.    ASSESSMENT AND PLAN:    ICD-10-CM ICD-9-CM    1. Encounter for well woman exam with routine gynecological exam  Z01.419 V72.31 Liquid-Based Pap Smear, Screening      HPV High Risk Genotypes, PCR              [place] was seen today for well woman.    Diagnoses and all orders for this visit:    Encounter for well woman exam with routine gynecological exam  - Cotesting today  - MMG up to date  - Cscope up to date  - DEXA up to date      Orders Placed This Encounter   Procedures    HPV High Risk Genotypes, PCR       No follow-ups on file.    Zara Beckett MD  Obstetrics & Gynecology

## 2023-06-01 LAB
FINAL PATHOLOGIC DIAGNOSIS: NORMAL
Lab: NORMAL

## 2023-06-02 NOTE — PROGRESS NOTES
I sent pt a my chart message -  I reviewed your  labs.  Your Ha1c was normal at 4.9.  Your Cholesterol looked good.in that your LDL ( bad cholesterol) was low.  Your Triglycerides were mildly elevated.  I do not worry about this number unless it is much higher. You do not require more medication for this at this time. Continue your Atorvastatin. Lifestyle modification with low carb diet and exercise can improve this number. No further recommendations at this time.    Dr. ROGERS

## 2023-06-03 LAB
CHOLEST SERPL-MCNC: 181 MG/DL
CHOLEST/HDLC SERPL: 2.8 (CALC)
HBA1C MFR BLD: 4.9 % OF TOTAL HGB
HDLC SERPL-MCNC: 64 MG/DL
LDLC SERPL CALC-MCNC: 87 MG/DL (CALC)
NONHDLC SERPL-MCNC: 117 MG/DL (CALC)
T4 FREE SERPL-MCNC: 1.5 NG/DL (ref 0.8–1.8)
TRIGL SERPL-MCNC: 199 MG/DL
TSH SERPL-ACNC: 0.04 MIU/L (ref 0.4–4.5)

## 2023-06-05 ENCOUNTER — TELEPHONE (OUTPATIENT)
Dept: PRIMARY CARE CLINIC | Facility: CLINIC | Age: 63
End: 2023-06-05
Payer: COMMERCIAL

## 2023-06-05 LAB
HPV HR 12 DNA SPEC QL NAA+PROBE: NEGATIVE
HPV16 AG SPEC QL: NEGATIVE
HPV18 DNA SPEC QL NAA+PROBE: NEGATIVE

## 2023-06-05 NOTE — TELEPHONE ENCOUNTER
I sw pt regarding her recent lab. States she is due to f/u in about 6 months with 's colleague due to him retiring. She states that he wants the tsh low due to h/o thyroid cancer

## 2023-06-05 NOTE — TELEPHONE ENCOUNTER
----- Message from Shana Jade MD sent at 6/5/2023  6:20 AM CDT -----  I sent pt a my chart message -  Your TSH was low at  0.04 and Free T4 was normal.  When is your follow up with Dr Vitale as he may decide to adjust your regimen?  Dr. ROGERS

## 2023-06-05 NOTE — PROGRESS NOTES
I sent pt a my chart message -  Your TSH was low at  0.04 and Free T4 was normal.  When is your follow up with Dr Vitale as he may decide to adjust your regimen?  Dr. ROGERS

## 2023-06-06 ENCOUNTER — PATIENT MESSAGE (OUTPATIENT)
Dept: PRIMARY CARE CLINIC | Facility: CLINIC | Age: 63
End: 2023-06-06
Payer: COMMERCIAL

## 2023-06-06 NOTE — TELEPHONE ENCOUNTER
It's a little different for thyroid cancer patients so unfortunately she still has to see Endo.     Dr. ROGERS

## 2023-06-13 LAB — BMD RECOMMENDATION EXT: NORMAL

## 2023-06-19 ENCOUNTER — OFFICE VISIT (OUTPATIENT)
Dept: SPORTS MEDICINE | Facility: CLINIC | Age: 63
End: 2023-06-19
Payer: COMMERCIAL

## 2023-06-19 ENCOUNTER — TELEPHONE (OUTPATIENT)
Dept: SPORTS MEDICINE | Facility: CLINIC | Age: 63
End: 2023-06-19
Payer: COMMERCIAL

## 2023-06-19 VITALS
SYSTOLIC BLOOD PRESSURE: 140 MMHG | BODY MASS INDEX: 45.28 KG/M2 | DIASTOLIC BLOOD PRESSURE: 77 MMHG | WEIGHT: 247.56 LBS | HEART RATE: 97 BPM

## 2023-06-19 DIAGNOSIS — M25.562 ACUTE PAIN OF LEFT KNEE: Primary | ICD-10-CM

## 2023-06-19 DIAGNOSIS — M25.462 SWELLING OF JOINT OF LEFT KNEE: ICD-10-CM

## 2023-06-19 DIAGNOSIS — M17.12 PRIMARY OSTEOARTHRITIS OF LEFT KNEE: ICD-10-CM

## 2023-06-19 PROCEDURE — 1159F MED LIST DOCD IN RCRD: CPT | Mod: CPTII,S$GLB,, | Performed by: PHYSICIAN ASSISTANT

## 2023-06-19 PROCEDURE — 3078F PR MOST RECENT DIASTOLIC BLOOD PRESSURE < 80 MM HG: ICD-10-PCS | Mod: CPTII,S$GLB,, | Performed by: PHYSICIAN ASSISTANT

## 2023-06-19 PROCEDURE — 3044F PR MOST RECENT HEMOGLOBIN A1C LEVEL <7.0%: ICD-10-PCS | Mod: CPTII,S$GLB,, | Performed by: PHYSICIAN ASSISTANT

## 2023-06-19 PROCEDURE — 3078F DIAST BP <80 MM HG: CPT | Mod: CPTII,S$GLB,, | Performed by: PHYSICIAN ASSISTANT

## 2023-06-19 PROCEDURE — 3008F BODY MASS INDEX DOCD: CPT | Mod: CPTII,S$GLB,, | Performed by: PHYSICIAN ASSISTANT

## 2023-06-19 PROCEDURE — 3008F PR BODY MASS INDEX (BMI) DOCUMENTED: ICD-10-PCS | Mod: CPTII,S$GLB,, | Performed by: PHYSICIAN ASSISTANT

## 2023-06-19 PROCEDURE — 4010F PR ACE/ARB THEARPY RXD/TAKEN: ICD-10-PCS | Mod: CPTII,S$GLB,, | Performed by: PHYSICIAN ASSISTANT

## 2023-06-19 PROCEDURE — 4010F ACE/ARB THERAPY RXD/TAKEN: CPT | Mod: CPTII,S$GLB,, | Performed by: PHYSICIAN ASSISTANT

## 2023-06-19 PROCEDURE — 1159F PR MEDICATION LIST DOCUMENTED IN MEDICAL RECORD: ICD-10-PCS | Mod: CPTII,S$GLB,, | Performed by: PHYSICIAN ASSISTANT

## 2023-06-19 PROCEDURE — 99999 PR PBB SHADOW E&M-EST. PATIENT-LVL III: CPT | Mod: PBBFAC,,, | Performed by: PHYSICIAN ASSISTANT

## 2023-06-19 PROCEDURE — 3044F HG A1C LEVEL LT 7.0%: CPT | Mod: CPTII,S$GLB,, | Performed by: PHYSICIAN ASSISTANT

## 2023-06-19 PROCEDURE — 3077F PR MOST RECENT SYSTOLIC BLOOD PRESSURE >= 140 MM HG: ICD-10-PCS | Mod: CPTII,S$GLB,, | Performed by: PHYSICIAN ASSISTANT

## 2023-06-19 PROCEDURE — 99214 PR OFFICE/OUTPT VISIT, EST, LEVL IV, 30-39 MIN: ICD-10-PCS | Mod: S$GLB,,, | Performed by: PHYSICIAN ASSISTANT

## 2023-06-19 PROCEDURE — 99999 PR PBB SHADOW E&M-EST. PATIENT-LVL III: ICD-10-PCS | Mod: PBBFAC,,, | Performed by: PHYSICIAN ASSISTANT

## 2023-06-19 PROCEDURE — 3077F SYST BP >= 140 MM HG: CPT | Mod: CPTII,S$GLB,, | Performed by: PHYSICIAN ASSISTANT

## 2023-06-19 PROCEDURE — 99214 OFFICE O/P EST MOD 30 MIN: CPT | Mod: S$GLB,,, | Performed by: PHYSICIAN ASSISTANT

## 2023-06-19 RX ORDER — METHYLPREDNISOLONE 4 MG/1
TABLET ORAL
Qty: 21 EACH | Refills: 0 | Status: SHIPPED | OUTPATIENT
Start: 2023-06-19 | End: 2023-07-10 | Stop reason: ALTCHOICE

## 2023-06-19 RX ORDER — TRAMADOL HYDROCHLORIDE 50 MG/1
50 TABLET ORAL EVERY 8 HOURS PRN
Qty: 21 TABLET | Refills: 0 | Status: SHIPPED | OUTPATIENT
Start: 2023-06-19 | End: 2023-07-19 | Stop reason: SDUPTHER

## 2023-06-19 NOTE — TELEPHONE ENCOUNTER
Called and spoke to patient and scheduled to come in today----- Message from Annie Rai sent at 6/19/2023 11:44 AM CDT -----  Regarding: SAME DAY APPOINTMENT - ADVISE  Contact: Self  Pt stated she is having a lot of pain in her left knee. Pt stated nothing is helping. Pt's pain level: 10. Pt was offered to schedule an appointment for today,however ask for a call back from staff as soon as possible today please.      Contact info [phone] (home)

## 2023-06-19 NOTE — PROGRESS NOTES
CC: Left knee pain    Patient presents today for follow-up evaluation of left knee pain.  At her last visit, she received a corticosteroid injection into the left knee which unfortunately did not provide any relief.  She denies any recent falls, injuries, or trauma to the left knee.  She has been helping take care of her mother the past 2 weeks and feels that this has led to an increase in knee pain.  She has been using topical Voltaren, taking Tylenol, and icing the knee with little relief.  Here today to discuss further treatment options.      HPI (5/18/2023):  Patient is a 62-year-old female who presents today for initial evaluation of left knee pain.  She has been seen by me before for her left shoulder.  Patient reports that she began experiencing acute left knee pain a little over a week ago.  She is unaware of any recent falls, injuries, or trauma to the left knee that may have led to this.  She localizes the pain to the medial aspect of the left knee without radiation.  She describes it as a sharp, stabbing pain that is worse with bearing weight, bending the knee, going up and down stairs, and squatting.  She feels that the left knee has been somewhat swollen since this began.  She denies any subjective instability or mechanical grinding/catching in the left knee.  She sees a rheumatologist for osteoporosis and is on Prolia, but she denies any other rheumatologic issues.  Due to her history of CKD, she is unable to take oral anti-inflammatories, and has been icing the knee and taking Tylenol for pain with limited relief.  No prior injuries or surgeries on her left knee.    - mechanical symptoms, - instability    Is affecting ADLs.  Pain is 5/10 at it's worst.    REVIEW OF SYSTEMS:  Constitution: Negative. Negative for chills, fever and night sweats.   HENT: Negative for congestion and headaches.    Eyes: Negative for blurred vision, left vision loss and right vision loss.   Cardiovascular: Negative for chest  pain and syncope.   Respiratory: Negative for cough and shortness of breath.    Endocrine: Negative for polydipsia, polyphagia and polyuria.   Hematologic/Lymphatic: Negative for bleeding problem. Does not bruise/bleed easily.   Skin: Negative for dry skin, itching and rash.   Musculoskeletal: Negative for falls. Positive for left knee pain and  muscle weakness.   Gastrointestinal: Negative for abdominal pain and bowel incontinence.   Genitourinary: Negative for bladder incontinence and nocturia.   Neurological: Negative for disturbances in coordination, loss of balance and seizures.   Psychiatric/Behavioral: Negative for depression. The patient does not have insomnia.    Allergic/Immunologic: Negative for hives and persistent infections.     PAST MEDICAL HISTORY:    Past Medical History:   Diagnosis Date    BRCA1 negative     BRCA2 negative     CKD (chronic kidney disease), stage III 10/12/2021    Family history of breast cancer     Fibromyalgia     Hypertension     Hypothyroidism     Malignant hyperthermia due to anesthesia     Osteoporosis     Skin cancer 10/12/2021    Thyroid cancer 10/12/2021       PAST SURGICAL HISTORY:   Past Surgical History:   Procedure Laterality Date    CATARACT EXTRACTION, BILATERAL      CHOLECYSTECTOMY  05/1981    DILATION AND CURETTAGE OF UTERUS      SKIN CANCER EXCISION      TOTAL THYROIDECTOMY  03/2002    TRIGGER FINGER RELEASE Right 11/2020       FAMILY HISTORY:   Family History   Problem Relation Age of Onset    Breast cancer Mother 74    Dementia Mother     Breast cancer Sister 51    Breast cancer Maternal Grandmother     Colon cancer Neg Hx     Ovarian cancer Neg Hx        SOCIAL HISTORY:   Social History     Socioeconomic History    Marital status:    Tobacco Use    Smoking status: Never    Smokeless tobacco: Never   Substance and Sexual Activity    Alcohol use: Never    Drug use: Never    Sexual activity: Yes     Partners: Male     Comment:        MEDICATIONS:      Current Outpatient Medications:     atorvastatin (LIPITOR) 20 MG tablet, Take 1 tablet (20 mg total) by mouth once daily., Disp: 30 tablet, Rfl: 5    calcium-vitamin D3 (OS-MICH 500 + D3) 500 mg-5 mcg (200 unit) per tablet, Take 1 tablet by mouth., Disp: , Rfl:     cetirizine 10 mg Cap, Take by mouth., Disp: , Rfl:     cyanocobalamin, vitamin B-12, 5,000 mcg Cap, Take by mouth., Disp: , Rfl:     denosumab (PROLIA) 60 mg/mL Syrg, Inject 1 mL (60 mg total) into the skin every 6 (six) months., Disp: , Rfl:     hydroCHLOROthiazide (HYDRODIURIL) 12.5 MG Tab, Take 1 tablet (12.5 mg total) by mouth once daily., Disp: 30 tablet, Rfl: 5    losartan (COZAAR) 100 MG tablet, 1 po daily, Disp: 30 tablet, Rfl: 5    methylPREDNISolone (MEDROL DOSEPACK) 4 mg tablet, use as directed, Disp: 21 each, Rfl: 0    metoprolol succinate (TOPROL-XL) 50 MG 24 hr tablet, Take 1 tablet (50 mg total) by mouth once daily., Disp: 30 tablet, Rfl: 5    pregabalin (LYRICA) 50 MG capsule, Take 50 mg by mouth 3 (three) times daily., Disp: , Rfl:     SYNTHROID 200 mcg tablet, Take one tablet 6 days a week and 1/2 tablet on Weds, Disp: , Rfl:     traMADoL (ULTRAM) 50 mg tablet, Take 1 tablet (50 mg total) by mouth every 8 (eight) hours as needed for Pain., Disp: 21 tablet, Rfl: 0    venlafaxine (EFFEXOR-XR) 150 MG Cp24, Take 1 capsule (150 mg total) by mouth every evening. With 37.5mg capsule. Total daily dose 187.5mg, Disp: 90 capsule, Rfl: 1    venlafaxine (EFFEXOR-XR) 37.5 MG 24 hr capsule, Take 1 capsule (37.5 mg total) by mouth once daily. With 150mg. Total daily dose 187.5mg, Disp: 90 capsule, Rfl: 1    ALLERGIES:   Review of patient's allergies indicates:   Allergen Reactions    Nsaids (non-steroidal anti-inflammatory drug)     Neosporin (neomycin-polymyx) Rash       VITAL SIGNS:   BP (!) 140/77   Pulse 97   Wt 112.3 kg (247 lb 9.2 oz)   BMI 45.28 kg/m²      PHYSICAL EXAMINATION  General:  The patient is alert and oriented x 3.  Mood is  pleasant.  Observation of ears, eyes and nose reveal no gross abnormalities.  No labored breathing observed.    LEFT KNEE EXAMINATION     OBSERVATION / INSPECTION   Gait:   antalgic   Alignment:  Neutral   Scars:   None   Muscle atrophy: Mild  Effusion:  1+   Warmth:  None   Discoloration:   none     TENDERNESS / CREPITUS (T / C):          T / C      T / C   Patella   - / -   Lateral joint line   - / -   Peripatellar medial  -  Medial joint line    + / -   Peripatellar lateral -  Medial plica   - / -   Patellar tendon -   Popliteal fossa   - / -   Quad tendon   -   Gastrocnemius   -   Prepatellar Bursa - / -   Quadricep   -   Tibial tubercle  -  Thigh/hamstring  -   Pes anserine/HS +  Fibula    -   ITB   - / -  Tibia     -   Tib/fib joint  - / -  LCL    -     MFC   - / -   MCL: Proximal  -    LFC   - / -    Distal   +          ROM: (* = pain)  PASSIVE   ACTIVE      Left :   5 / 0 / 90*   5 / 0 / 90*     Right :    5 / 0 / 120   5 / 0 / 120    Patellofemoral examination:  See above noted areas of tenderness.   Patella position    Subluxation / dislocation: Centered           Sup. / Inf;   Normal   Crepitus (PF):    Absent   Patellar Mobility:       Medial-lateral:   Normal    Superior-inferior:  Normal    Inferior tilt   Normal    Patellar tendon:  Normal   Lateral tilt:    Normal   J-sign:     None   Patellofemoral grind:   No pain       MENISCAL SIGNS:     Pain on terminal extension:  -  Pain on terminal flexion:  +  Mary Ellens maneuver:  + (for pain)  Squat     deferred    LIGAMENT EXAMINATION:  ACL / Lachman:  normal (-1 to 2mm)    PCL-Post.  drawer: normal 0 to 2mm  MCL- Valgus:  normal 0 to 2mm  LCL- Varus:  normal 0 to 2mm  Pivot shift: normal (Equal)   Dial Test: difference c/w other side   At 30° flexion: normal (< 5°)    At 90° flexion: normal (< 5°)   Reverse Pivot Shift:   normal (Equal)     STRENGTH: (* = with pain) PAINFUL SIDE   Quadricep   4/5   Hamstrin/5    EXTREMITY NEURO-VASCULAR  EXAMINATION:   Sensation:  Grossly intact to light touch all dermatomal regions.   Motor Function:  Fully intact motor function at hip, knee, foot and ankle    DTRs;  quadriceps and  achilles 2+.  No clonus and downgoing Babinski.    Vascular status:  DP and PT pulses 2+, brisk capillary refill, symmetric.     OTHER FINDINGS:  N/A    X-rays bilateral knees (5/18/2023):      FINDINGS:  Bones are demineralized.  Mild narrowing at the femoral tibial joints.  Small left effusion.  May be small right effusion.  Narrowing of the patellofemoral joints.    Kellgren magy grade 2 bilaterally       ASSESSMENT:    Left knee pain, possible medial meniscus tear  Mild primary osteoarthritis of left knee    PLAN:     I made the decision to obtain old records of the patient including previous notes and imaging. New imaging was ordered today of the extremity or extremities evaluated. I independently reviewed and interpreted the radiographs and/or MRIs today as well as prior imaging, if available.    We discussed at length different treatment options including conservative vs surgical management. These include anti-inflammatories, acetaminophen, rest, ice, heat, formal physical therapy including strengthening and stretching exercises, home exercise programs, dry needling, corticosteroid versus viscosupplementation injections and finally surgical intervention.      Orders placed for MRI without contrast of left knee to evaluate for possible meniscus tear.    Prescription for Medrol Dosepak and tramadol 50 mg 1 p.o. q.8 hours p.r.n. pain provided today.    Recommend she continue to rest, ice, and elevate the left knee, he is topical analgesics, and take over-the-counter acetaminophen as needed for pain.    Follow-up in clinic with MRI results.    All questions were answered, pt will contact us for questions or concerns in the interim.      Medical Dictation software was used during the dictation of portions or the entirety of this  medical record.  Phonetic or grammatic errors may exist due to the use of this software. For clarification, refer to the author of the document.

## 2023-06-21 ENCOUNTER — PATIENT MESSAGE (OUTPATIENT)
Dept: SPORTS MEDICINE | Facility: CLINIC | Age: 63
End: 2023-06-21
Payer: COMMERCIAL

## 2023-06-21 ENCOUNTER — TELEPHONE (OUTPATIENT)
Dept: SPORTS MEDICINE | Facility: CLINIC | Age: 63
End: 2023-06-21
Payer: COMMERCIAL

## 2023-06-21 NOTE — TELEPHONE ENCOUNTER
Called and spoke to patient. Told her Chao suggests she try taking two pills tonight and to let us know tomorrow if that helped. Patient voiced understanding.            FT  I am having trouble sleeping because of the pain and would like to know if I could get something a little stronger.   Thank you,  Bree Gonzales

## 2023-06-29 ENCOUNTER — HOSPITAL ENCOUNTER (OUTPATIENT)
Dept: RADIOLOGY | Facility: HOSPITAL | Age: 63
Discharge: HOME | End: 2023-06-29
Attending: PHYSICIAN ASSISTANT
Payer: COMMERCIAL

## 2023-06-29 DIAGNOSIS — M25.562 ACUTE PAIN OF LEFT KNEE: ICD-10-CM

## 2023-06-29 PROCEDURE — 73721 MRI KNEE WITHOUT CONTRAST LEFT: ICD-10-PCS | Mod: 26,LT,, | Performed by: RADIOLOGY

## 2023-06-29 PROCEDURE — 73721 MRI JNT OF LWR EXTRE W/O DYE: CPT | Mod: TC,LT

## 2023-06-29 PROCEDURE — 73721 MRI JNT OF LWR EXTRE W/O DYE: CPT | Mod: 26,LT,, | Performed by: RADIOLOGY

## 2023-06-29 NOTE — PROGRESS NOTES
CC: Left knee pain    Patient is a 63-year-old female who presents today for follow-up evaluation of left knee pain.  She recently underwent an MRI of the left knee to evaluate for a possible medial meniscus tear.  Thus far treatment has included Tylenol, topical Voltaren, and tramadol for breakthrough pain.  She also received a corticosteroid injection which unfortunately did not provide much relief.  She has been been wearing a knee brace.  No new injuries since her last visit.  Pain is typically worse with prolonged walking/bearing weight and going up and down stairs.  Occasionally, she notices some mechanical catching when bending and straightening the knee.  Here today to discuss MRI results and treatment options moving forward.      HPI (5/18/2023):  Patient is a 62-year-old female who presents today for initial evaluation of left knee pain.  She has been seen by me before for her left shoulder.  Patient reports that she began experiencing acute left knee pain a little over a week ago.  She is unaware of any recent falls, injuries, or trauma to the left knee that may have led to this.  She localizes the pain to the medial aspect of the left knee without radiation.  She describes it as a sharp, stabbing pain that is worse with bearing weight, bending the knee, going up and down stairs, and squatting.  She feels that the left knee has been somewhat swollen since this began.  She denies any subjective instability or mechanical grinding/catching in the left knee.  She sees a rheumatologist for osteoporosis and is on Prolia, but she denies any other rheumatologic issues.  Due to her history of CKD, she is unable to take oral anti-inflammatories, and has been icing the knee and taking Tylenol for pain with limited relief.  No prior injuries or surgeries on her left knee.    + mechanical symptoms, - instability    Is affecting ADLs.  Pain is 5/10 at it's worst.    REVIEW OF SYSTEMS:  Constitution: Negative. Negative  for chills, fever and night sweats.   HENT: Negative for congestion and headaches.    Eyes: Negative for blurred vision, left vision loss and right vision loss.   Cardiovascular: Negative for chest pain and syncope.   Respiratory: Negative for cough and shortness of breath.    Endocrine: Negative for polydipsia, polyphagia and polyuria.   Hematologic/Lymphatic: Negative for bleeding problem. Does not bruise/bleed easily.   Skin: Negative for dry skin, itching and rash.   Musculoskeletal: Negative for falls. Positive for left knee pain and  muscle weakness.   Gastrointestinal: Negative for abdominal pain and bowel incontinence.   Genitourinary: Negative for bladder incontinence and nocturia.   Neurological: Negative for disturbances in coordination, loss of balance and seizures.   Psychiatric/Behavioral: Negative for depression. The patient does not have insomnia.    Allergic/Immunologic: Negative for hives and persistent infections.     PAST MEDICAL HISTORY:    Past Medical History:   Diagnosis Date    BRCA1 negative     BRCA2 negative     CKD (chronic kidney disease), stage III 10/12/2021    Family history of breast cancer     Fibromyalgia     Hypertension     Hypothyroidism     Malignant hyperthermia due to anesthesia     Osteoporosis     Skin cancer 10/12/2021    Thyroid cancer 10/12/2021       PAST SURGICAL HISTORY:   Past Surgical History:   Procedure Laterality Date    CATARACT EXTRACTION, BILATERAL      CHOLECYSTECTOMY  05/1981    DILATION AND CURETTAGE OF UTERUS      SKIN CANCER EXCISION      TOTAL THYROIDECTOMY  03/2002    TRIGGER FINGER RELEASE Right 11/2020       FAMILY HISTORY:   Family History   Problem Relation Age of Onset    Breast cancer Mother 74    Dementia Mother     Breast cancer Sister 51    Breast cancer Maternal Grandmother     Colon cancer Neg Hx     Ovarian cancer Neg Hx        SOCIAL HISTORY:   Social History     Socioeconomic History    Marital status:    Tobacco Use    Smoking  status: Never    Smokeless tobacco: Never   Substance and Sexual Activity    Alcohol use: Never    Drug use: Never    Sexual activity: Yes     Partners: Male     Comment:        MEDICATIONS:     Current Outpatient Medications:     atorvastatin (LIPITOR) 20 MG tablet, Take 1 tablet (20 mg total) by mouth once daily., Disp: 30 tablet, Rfl: 5    calcium-vitamin D3 (OS-MICH 500 + D3) 500 mg-5 mcg (200 unit) per tablet, Take 1 tablet by mouth., Disp: , Rfl:     cetirizine 10 mg Cap, Take by mouth., Disp: , Rfl:     cyanocobalamin, vitamin B-12, 5,000 mcg Cap, Take by mouth., Disp: , Rfl:     denosumab (PROLIA) 60 mg/mL Syrg, Inject 1 mL (60 mg total) into the skin every 6 (six) months., Disp: , Rfl:     hydroCHLOROthiazide (HYDRODIURIL) 12.5 MG Tab, Take 1 tablet (12.5 mg total) by mouth once daily., Disp: 30 tablet, Rfl: 5    losartan (COZAAR) 100 MG tablet, 1 po daily, Disp: 30 tablet, Rfl: 5    methylPREDNISolone (MEDROL DOSEPACK) 4 mg tablet, use as directed, Disp: 21 each, Rfl: 0    metoprolol succinate (TOPROL-XL) 50 MG 24 hr tablet, Take 1 tablet (50 mg total) by mouth once daily., Disp: 30 tablet, Rfl: 5    pregabalin (LYRICA) 50 MG capsule, Take 50 mg by mouth 3 (three) times daily., Disp: , Rfl:     SYNTHROID 200 mcg tablet, Take one tablet 6 days a week and 1/2 tablet on Weds, Disp: , Rfl:     traMADoL (ULTRAM) 50 mg tablet, Take 1 tablet (50 mg total) by mouth every 8 (eight) hours as needed for Pain., Disp: 21 tablet, Rfl: 0    venlafaxine (EFFEXOR-XR) 150 MG Cp24, Take 1 capsule (150 mg total) by mouth every evening. With 37.5mg capsule. Total daily dose 187.5mg, Disp: 90 capsule, Rfl: 1    venlafaxine (EFFEXOR-XR) 37.5 MG 24 hr capsule, Take 1 capsule (37.5 mg total) by mouth once daily. With 150mg. Total daily dose 187.5mg, Disp: 90 capsule, Rfl: 1    ALLERGIES:   Review of patient's allergies indicates:   Allergen Reactions    Nsaids (non-steroidal anti-inflammatory drug)     Neosporin  "(neomycin-polymyx) Rash       VITAL SIGNS:   /73   Pulse 83   Ht 5' 2" (1.575 m)   Wt 112 kg (247 lb)   BMI 45.18 kg/m²      PHYSICAL EXAMINATION  General:  The patient is alert and oriented x 3.  Mood is pleasant.  Observation of ears, eyes and nose reveal no gross abnormalities.  No labored breathing observed.    LEFT KNEE EXAMINATION     OBSERVATION / INSPECTION   Gait:   antalgic   Alignment:  Neutral   Scars:   None   Muscle atrophy: Mild  Effusion:  1+   Warmth:  None   Discoloration:   none     TENDERNESS / CREPITUS (T / C):          T / C      T / C   Patella   - / -   Lateral joint line   - / -   Peripatellar medial  -  Medial joint line    + / -   Peripatellar lateral -  Medial plica   - / -   Patellar tendon -   Popliteal fossa   - / -   Quad tendon   -   Gastrocnemius   -   Prepatellar Bursa - / -   Quadricep   -   Tibial tubercle  -  Thigh/hamstring  -   Pes anserine/HS +  Fibula    -   ITB   - / -  Tibia     -   Tib/fib joint  - / -  LCL    -     MFC   - / -   MCL: Proximal  -    LFC   - / -    Distal   +          ROM: (* = pain)  PASSIVE   ACTIVE      Left :   5 / 0 / 100*   5 / 0 / 100*     Right :    5 / 0 / 120   5 / 0 / 120    Patellofemoral examination:  See above noted areas of tenderness.   Patella position    Subluxation / dislocation: Centered           Sup. / Inf;   Normal   Crepitus (PF):    Absent   Patellar Mobility:       Medial-lateral:   Normal    Superior-inferior:  Normal    Inferior tilt   Normal    Patellar tendon:  Normal   Lateral tilt:    Normal   J-sign:     None   Patellofemoral grind:   No pain       MENISCAL SIGNS:     Pain on terminal extension:  -  Pain on terminal flexion:  +  Mary Ellens maneuver:  + (for pain)  Squat     deferred    LIGAMENT EXAMINATION:  ACL / Lachman:  normal (-1 to 2mm)    PCL-Post.  drawer: normal 0 to 2mm  MCL- Valgus:  normal 0 to 2mm  LCL- Varus:  normal 0 to 2mm  Pivot shift: normal (Equal)   Dial Test: difference c/w other side   At " 30° flexion: normal (< 5°)    At 90° flexion: normal (< 5°)   Reverse Pivot Shift:   normal (Equal)     STRENGTH: (* = with pain) PAINFUL SIDE   Quadricep   4/5   Hamstrin/5    EXTREMITY NEURO-VASCULAR EXAMINATION:   Sensation:  Grossly intact to light touch all dermatomal regions.   Motor Function:  Fully intact motor function at hip, knee, foot and ankle    DTRs;  quadriceps and  achilles 2+.  No clonus and downgoing Babinski.    Vascular status:  DP and PT pulses 2+, brisk capillary refill, symmetric.     OTHER FINDINGS:  N/A    X-rays bilateral knees (2023):      FINDINGS:  Bones are demineralized.  Mild narrowing at the femoral tibial joints.  Small left effusion.  May be small right effusion.  Narrowing of the patellofemoral joints.    Kellgren magy grade 2 bilaterally    MRI left knee (2023):  MRI images of the left knee ordered and independently interpreted by me show:  Complex tear of the medial meniscus with extrusion into the superior medial gutter.  Edema of the MCL which could be reactive next to adjacent medial meniscus tear versus a mild sprain.  Chondromalacia of the patellofemoral and medial tibiofemoral compartments.       ASSESSMENT:    Left knee pain  Complex tear of medial meniscus, left knee  Chondromalacia of left knee  Mild primary osteoarthritis of left knee    PLAN:     I made the decision to obtain old records of the patient including previous notes and imaging. I independently reviewed and interpreted the radiographs and/or MRIs today as well as prior imaging. Reviewed MRI and radiograph results with patient in detail.    We discussed at length different treatment options including conservative vs surgical management. These include anti-inflammatories, acetaminophen, rest, ice, heat, formal physical therapy including strengthening and stretching exercises, home exercise programs, dry needling, corticosteroid versus viscosupplementation injections and finally  surgical intervention.      Patient is considering proceeding with left knee arthroscopy to address the medial meniscus tear in her knee.  She would like to take some time to think about this as well as other conservative management options that we discussed today in clinic.  In the interim, recommend she continue to rest, ice, and elevate the left knee and take over-the-counter acetaminophen and use topical analgesics as needed for pain.    She will reach out to us to schedule further appointments or to discuss surgical planning should she decide to proceed.      All questions were answered, pt will contact us for questions or concerns in the interim.      Medical Dictation software was used during the dictation of portions or the entirety of this medical record.  Phonetic or grammatic errors may exist due to the use of this software. For clarification, refer to the author of the document.

## 2023-06-30 ENCOUNTER — OFFICE VISIT (OUTPATIENT)
Dept: SPORTS MEDICINE | Facility: CLINIC | Age: 63
End: 2023-06-30
Payer: COMMERCIAL

## 2023-06-30 VITALS
BODY MASS INDEX: 45.45 KG/M2 | HEIGHT: 62 IN | WEIGHT: 247 LBS | SYSTOLIC BLOOD PRESSURE: 107 MMHG | HEART RATE: 83 BPM | DIASTOLIC BLOOD PRESSURE: 73 MMHG

## 2023-06-30 DIAGNOSIS — M25.562 ACUTE PAIN OF LEFT KNEE: Primary | ICD-10-CM

## 2023-06-30 DIAGNOSIS — M17.12 PRIMARY OSTEOARTHRITIS OF LEFT KNEE: ICD-10-CM

## 2023-06-30 DIAGNOSIS — M94.262 CHONDROMALACIA OF KNEE, LEFT: ICD-10-CM

## 2023-06-30 DIAGNOSIS — S83.232A COMPLEX TEAR OF MEDIAL MENISCUS OF LEFT KNEE AS CURRENT INJURY, INITIAL ENCOUNTER: ICD-10-CM

## 2023-06-30 PROCEDURE — 3044F HG A1C LEVEL LT 7.0%: CPT | Mod: CPTII,S$GLB,, | Performed by: PHYSICIAN ASSISTANT

## 2023-06-30 PROCEDURE — 3044F PR MOST RECENT HEMOGLOBIN A1C LEVEL <7.0%: ICD-10-PCS | Mod: CPTII,S$GLB,, | Performed by: PHYSICIAN ASSISTANT

## 2023-06-30 PROCEDURE — 1159F MED LIST DOCD IN RCRD: CPT | Mod: CPTII,S$GLB,, | Performed by: PHYSICIAN ASSISTANT

## 2023-06-30 PROCEDURE — 99999 PR PBB SHADOW E&M-EST. PATIENT-LVL IV: CPT | Mod: PBBFAC,,, | Performed by: PHYSICIAN ASSISTANT

## 2023-06-30 PROCEDURE — 3078F DIAST BP <80 MM HG: CPT | Mod: CPTII,S$GLB,, | Performed by: PHYSICIAN ASSISTANT

## 2023-06-30 PROCEDURE — 3078F PR MOST RECENT DIASTOLIC BLOOD PRESSURE < 80 MM HG: ICD-10-PCS | Mod: CPTII,S$GLB,, | Performed by: PHYSICIAN ASSISTANT

## 2023-06-30 PROCEDURE — 3008F BODY MASS INDEX DOCD: CPT | Mod: CPTII,S$GLB,, | Performed by: PHYSICIAN ASSISTANT

## 2023-06-30 PROCEDURE — 99214 OFFICE O/P EST MOD 30 MIN: CPT | Mod: S$GLB,,, | Performed by: PHYSICIAN ASSISTANT

## 2023-06-30 PROCEDURE — 3074F PR MOST RECENT SYSTOLIC BLOOD PRESSURE < 130 MM HG: ICD-10-PCS | Mod: CPTII,S$GLB,, | Performed by: PHYSICIAN ASSISTANT

## 2023-06-30 PROCEDURE — 3074F SYST BP LT 130 MM HG: CPT | Mod: CPTII,S$GLB,, | Performed by: PHYSICIAN ASSISTANT

## 2023-06-30 PROCEDURE — 4010F ACE/ARB THERAPY RXD/TAKEN: CPT | Mod: CPTII,S$GLB,, | Performed by: PHYSICIAN ASSISTANT

## 2023-06-30 PROCEDURE — 99999 PR PBB SHADOW E&M-EST. PATIENT-LVL IV: ICD-10-PCS | Mod: PBBFAC,,, | Performed by: PHYSICIAN ASSISTANT

## 2023-06-30 PROCEDURE — 3008F PR BODY MASS INDEX (BMI) DOCUMENTED: ICD-10-PCS | Mod: CPTII,S$GLB,, | Performed by: PHYSICIAN ASSISTANT

## 2023-06-30 PROCEDURE — 4010F PR ACE/ARB THEARPY RXD/TAKEN: ICD-10-PCS | Mod: CPTII,S$GLB,, | Performed by: PHYSICIAN ASSISTANT

## 2023-06-30 PROCEDURE — 99214 PR OFFICE/OUTPT VISIT, EST, LEVL IV, 30-39 MIN: ICD-10-PCS | Mod: S$GLB,,, | Performed by: PHYSICIAN ASSISTANT

## 2023-06-30 PROCEDURE — 1159F PR MEDICATION LIST DOCUMENTED IN MEDICAL RECORD: ICD-10-PCS | Mod: CPTII,S$GLB,, | Performed by: PHYSICIAN ASSISTANT

## 2023-07-10 ENCOUNTER — TELEPHONE (OUTPATIENT)
Dept: ORTHOPEDICS | Facility: CLINIC | Age: 63
End: 2023-07-10
Payer: COMMERCIAL

## 2023-07-10 ENCOUNTER — OFFICE VISIT (OUTPATIENT)
Dept: ORTHOPEDICS | Facility: CLINIC | Age: 63
End: 2023-07-10
Payer: COMMERCIAL

## 2023-07-10 ENCOUNTER — PATIENT MESSAGE (OUTPATIENT)
Dept: SPORTS MEDICINE | Facility: CLINIC | Age: 63
End: 2023-07-10
Payer: COMMERCIAL

## 2023-07-10 ENCOUNTER — HOSPITAL ENCOUNTER (OUTPATIENT)
Dept: RADIOLOGY | Facility: HOSPITAL | Age: 63
Discharge: HOME | End: 2023-07-10
Attending: NURSE PRACTITIONER
Payer: COMMERCIAL

## 2023-07-10 VITALS — WEIGHT: 246.94 LBS | HEIGHT: 62 IN | BODY MASS INDEX: 45.44 KG/M2

## 2023-07-10 DIAGNOSIS — M79.671 RIGHT FOOT PAIN: ICD-10-CM

## 2023-07-10 DIAGNOSIS — M79.671 RIGHT FOOT PAIN: Primary | ICD-10-CM

## 2023-07-10 PROCEDURE — 3044F PR MOST RECENT HEMOGLOBIN A1C LEVEL <7.0%: ICD-10-PCS | Mod: CPTII,S$GLB,, | Performed by: NURSE PRACTITIONER

## 2023-07-10 PROCEDURE — 1160F RVW MEDS BY RX/DR IN RCRD: CPT | Mod: CPTII,S$GLB,, | Performed by: NURSE PRACTITIONER

## 2023-07-10 PROCEDURE — 99214 OFFICE O/P EST MOD 30 MIN: CPT | Mod: S$GLB,,, | Performed by: NURSE PRACTITIONER

## 2023-07-10 PROCEDURE — 1159F MED LIST DOCD IN RCRD: CPT | Mod: CPTII,S$GLB,, | Performed by: NURSE PRACTITIONER

## 2023-07-10 PROCEDURE — 99999 PR PBB SHADOW E&M-EST. PATIENT-LVL III: CPT | Mod: PBBFAC,,, | Performed by: NURSE PRACTITIONER

## 2023-07-10 PROCEDURE — 99214 PR OFFICE/OUTPT VISIT, EST, LEVL IV, 30-39 MIN: ICD-10-PCS | Mod: S$GLB,,, | Performed by: NURSE PRACTITIONER

## 2023-07-10 PROCEDURE — 3008F BODY MASS INDEX DOCD: CPT | Mod: CPTII,S$GLB,, | Performed by: NURSE PRACTITIONER

## 2023-07-10 PROCEDURE — 99999 PR PBB SHADOW E&M-EST. PATIENT-LVL III: ICD-10-PCS | Mod: PBBFAC,,, | Performed by: NURSE PRACTITIONER

## 2023-07-10 PROCEDURE — 1159F PR MEDICATION LIST DOCUMENTED IN MEDICAL RECORD: ICD-10-PCS | Mod: CPTII,S$GLB,, | Performed by: NURSE PRACTITIONER

## 2023-07-10 PROCEDURE — 1160F PR REVIEW ALL MEDS BY PRESCRIBER/CLIN PHARMACIST DOCUMENTED: ICD-10-PCS | Mod: CPTII,S$GLB,, | Performed by: NURSE PRACTITIONER

## 2023-07-10 PROCEDURE — 4010F ACE/ARB THERAPY RXD/TAKEN: CPT | Mod: CPTII,S$GLB,, | Performed by: NURSE PRACTITIONER

## 2023-07-10 PROCEDURE — 3044F HG A1C LEVEL LT 7.0%: CPT | Mod: CPTII,S$GLB,, | Performed by: NURSE PRACTITIONER

## 2023-07-10 PROCEDURE — 73630 X-RAY EXAM OF FOOT: CPT | Mod: TC,RT

## 2023-07-10 PROCEDURE — 73630 X-RAY EXAM OF FOOT: CPT | Mod: 26,RT,, | Performed by: RADIOLOGY

## 2023-07-10 PROCEDURE — 4010F PR ACE/ARB THEARPY RXD/TAKEN: ICD-10-PCS | Mod: CPTII,S$GLB,, | Performed by: NURSE PRACTITIONER

## 2023-07-10 PROCEDURE — 73630 XR FOOT COMPLETE 3 VIEW RIGHT: ICD-10-PCS | Mod: 26,RT,, | Performed by: RADIOLOGY

## 2023-07-10 PROCEDURE — 3008F PR BODY MASS INDEX (BMI) DOCUMENTED: ICD-10-PCS | Mod: CPTII,S$GLB,, | Performed by: NURSE PRACTITIONER

## 2023-07-10 RX ORDER — METHYLPREDNISOLONE 4 MG/1
TABLET ORAL
Qty: 1 EACH | Refills: 0 | Status: SHIPPED | OUTPATIENT
Start: 2023-07-10 | End: 2023-07-19 | Stop reason: ALTCHOICE

## 2023-07-10 NOTE — TELEPHONE ENCOUNTER
Called and informed pt I will notify El pt's medicine was not receive by [facility] on [facility]. Pt verbally understood and was satisfied.

## 2023-07-10 NOTE — PROGRESS NOTES
SUBJECTIVE:     Chief Complaint & History of Present Illness:  Bree Gonzales is a New 63 y.o. year old female patient here for constant right foot/ankle pain which has been present for 1 week.  There is not a history of injury.  The pain is located in the lateral aspect of the foot/ankle.  The pain is described as burning/sharp, 9/10.  It is aggravated by touching the area on the foot and walking.  There is not radiation, numbness or tingling into the toes.  Associated symptoms include worsens with activity. Previous treatments include none which have provided minimal relief.  There is not a history of previous injury or surgery to the foot.   The patient does not use an assistive device.    Review of patient's allergies indicates:   Allergen Reactions    Nsaids (non-steroidal anti-inflammatory drug)     Neosporin (neomycin-polymyx) Rash         Current Outpatient Medications   Medication Sig Dispense Refill    atorvastatin (LIPITOR) 20 MG tablet Take 1 tablet (20 mg total) by mouth once daily. 30 tablet 5    calcium-vitamin D3 (OS-MICH 500 + D3) 500 mg-5 mcg (200 unit) per tablet Take 1 tablet by mouth.      cetirizine 10 mg Cap Take by mouth.      hydroCHLOROthiazide (HYDRODIURIL) 12.5 MG Tab Take 1 tablet (12.5 mg total) by mouth once daily. 30 tablet 5    losartan (COZAAR) 100 MG tablet 1 po daily 30 tablet 5    methylPREDNISolone (MEDROL DOSEPACK) 4 mg tablet use as directed 21 each 0    metoprolol succinate (TOPROL-XL) 50 MG 24 hr tablet Take 1 tablet (50 mg total) by mouth once daily. 30 tablet 5    pregabalin (LYRICA) 50 MG capsule Take 50 mg by mouth 3 (three) times daily.      SYNTHROID 200 mcg tablet Take one tablet 6 days a week and 1/2 tablet on Weds      traMADoL (ULTRAM) 50 mg tablet Take 1 tablet (50 mg total) by mouth every 8 (eight) hours as needed for Pain. 21 tablet 0    venlafaxine (EFFEXOR-XR) 150 MG Cp24 Take 1 capsule (150 mg total) by mouth every evening. With 37.5mg capsule. Total daily  "dose 187.5mg 90 capsule 1    venlafaxine (EFFEXOR-XR) 37.5 MG 24 hr capsule Take 1 capsule (37.5 mg total) by mouth once daily. With 150mg. Total daily dose 187.5mg 90 capsule 1    cyanocobalamin, vitamin B-12, 5,000 mcg Cap Take by mouth.      denosumab (PROLIA) 60 mg/mL Syrg Inject 1 mL (60 mg total) into the skin every 6 (six) months.       No current facility-administered medications for this visit.       Past Medical History:   Diagnosis Date    BRCA1 negative     BRCA2 negative     CKD (chronic kidney disease), stage III 10/12/2021    Family history of breast cancer     Fibromyalgia     Hypertension     Hypothyroidism     Malignant hyperthermia due to anesthesia     Osteoporosis     Skin cancer 10/12/2021    Thyroid cancer 10/12/2021       Past Surgical History:   Procedure Laterality Date    CATARACT EXTRACTION, BILATERAL      CHOLECYSTECTOMY  05/1981    DILATION AND CURETTAGE OF UTERUS      SKIN CANCER EXCISION      TOTAL THYROIDECTOMY  03/2002    TRIGGER FINGER RELEASE Right 11/2020       Family History   Problem Relation Age of Onset    Breast cancer Mother 74    Dementia Mother     Breast cancer Sister 51    Breast cancer Maternal Grandmother     Colon cancer Neg Hx     Ovarian cancer Neg Hx          Review of Systems:  ROS:  Constitutional: no fever or chills  Eyes: no visual changes  ENT: no nasal congestion or sore throat  Respiratory: no cough or shortness of breath  Cardiovascular: no chest pain or palpitations  Gastrointestinal: no nausea or vomiting, tolerating diet  Genitourinary: no hematuria or dysuria  Integument/Breast: no rash or pruritis  Hematologic/Lymphatic: no easy bruising or lymphadenopathy  Musculoskeletal:  right foot pain  Neurological: no seizures or tremors  Behavioral/Psych: no auditory or visual hallucinations  Endocrine: no heat or cold intolerance      OBJECTIVE:     PHYSICAL EXAM:  Vital Signs (Most Recent)  There were no vitals filed for this visit.  Height: 5' 2" (157.5 cm) " "Weight: 112 kg (246 lb 14.6 oz),   Estimated body mass index is 45.16 kg/m² as calculated from the following:    Height as of this encounter: 5' 2" (1.575 m).    Weight as of this encounter: 112 kg (246 lb 14.6 oz).   General Appearance: Well nourished, well developed, in no acute distress.  HENT: Normal cephalic, oropharynx pink and moist  Eyes: PERRLA bilaterally and EOM x 4  Respiratory: Even and unlabored  Skin: Warm and Dry.   Psychiatric: AAO x 4, Mood & affect are normal.    right  Foot/Ankle    General appearance: no acute distress, alert/oriented x3, appropriate mood and affect, looks stated age, obese, and well nourished  The examination was performed out of splint/cast  Skin: normal  Swelling: none  Warmth: no warmth  Tenderness: diffuse and mild  ROM: 20 degrees dorsiflexion, 20 degrees plantarflexion, 20 degrees inversion, and 20 degrees eversion  Strength: normal  Gait: antalgic  Stability: stable to testing and Cotton test: negative  Crepitus: no  Neurological Exam: normal  Vascular Exam: normal and pulse present    Knows to palpation on lateral side of the foot.      RADIOGRAPHS:  X-ray of the right foot was obtained, there is a possible fracture to the 5th metatarsal that appears to be a stress fracture.  Ankle mortise is symmetrical.  Per radiology note no acute fracture seen on there reading.  All radiographs were personally reviewed by me.    ASSESSMENT/PLAN:       ICD-10-CM ICD-9-CM   1. Right foot pain  M79.671 729.5     Plan:  -Bree Gonzales presents to clinic today with c/c right foot/ankle pain for the past week.  -X-ray as above.  -Recommend RICE therapy.    I discussed the x-ray findings with the patient.  I offered to place the patient in a postop shoe but she would like to try and continue using regular shoes.  I recommend to walk using a flatfoot technique.  Patient was given example on how to properly performed.    I will treat the patient with a Medrol Dosepak.  She can not use " NSAIDs as she has chronic kidney disease stage 3.    If patient's symptoms fail to improve I will consider getting a MRI to rule out a stress fracture.

## 2023-07-10 NOTE — TELEPHONE ENCOUNTER
Appt booked in ortho today with Mr. El Costa for 2 pm   
no deformity, pain or tenderness. no restriction of movement

## 2023-07-17 DIAGNOSIS — M79.671 RIGHT FOOT PAIN: Primary | ICD-10-CM

## 2023-07-19 ENCOUNTER — HOSPITAL ENCOUNTER (OUTPATIENT)
Dept: RADIOLOGY | Facility: HOSPITAL | Age: 63
Discharge: HOME | End: 2023-07-19
Attending: NURSE PRACTITIONER
Payer: COMMERCIAL

## 2023-07-19 ENCOUNTER — PATIENT MESSAGE (OUTPATIENT)
Dept: ORTHOPEDICS | Facility: CLINIC | Age: 63
End: 2023-07-19
Payer: COMMERCIAL

## 2023-07-19 ENCOUNTER — PATIENT MESSAGE (OUTPATIENT)
Dept: SPORTS MEDICINE | Facility: CLINIC | Age: 63
End: 2023-07-19
Payer: COMMERCIAL

## 2023-07-19 DIAGNOSIS — S92.354D CLOSED NONDISPLACED FRACTURE OF FIFTH METATARSAL BONE OF RIGHT FOOT WITH ROUTINE HEALING, SUBSEQUENT ENCOUNTER: Primary | ICD-10-CM

## 2023-07-19 DIAGNOSIS — M79.671 RIGHT FOOT PAIN: ICD-10-CM

## 2023-07-19 DIAGNOSIS — M25.562 ACUTE PAIN OF LEFT KNEE: ICD-10-CM

## 2023-07-19 DIAGNOSIS — M25.462 SWELLING OF JOINT OF LEFT KNEE: ICD-10-CM

## 2023-07-19 DIAGNOSIS — M17.12 PRIMARY OSTEOARTHRITIS OF LEFT KNEE: ICD-10-CM

## 2023-07-19 PROCEDURE — 73718 MRI LOWER EXTREMITY W/O DYE: CPT | Mod: 26,RT,, | Performed by: RADIOLOGY

## 2023-07-19 PROCEDURE — 73718 MRI FOOT (MIDFOOT) RIGHT WITHOUT CONTRAST: ICD-10-PCS | Mod: 26,RT,, | Performed by: RADIOLOGY

## 2023-07-19 PROCEDURE — 73718 MRI LOWER EXTREMITY W/O DYE: CPT | Mod: TC,RT

## 2023-07-19 RX ORDER — TRAMADOL HYDROCHLORIDE 50 MG/1
50 TABLET ORAL EVERY 8 HOURS PRN
Qty: 21 TABLET | Refills: 0 | Status: SHIPPED | OUTPATIENT
Start: 2023-07-19 | End: 2023-08-24 | Stop reason: SDUPTHER

## 2023-07-19 RX ORDER — HYDROCODONE BITARTRATE AND ACETAMINOPHEN 5; 325 MG/1; MG/1
1 TABLET ORAL EVERY 8 HOURS PRN
Qty: 21 TABLET | Refills: 0 | Status: SHIPPED | OUTPATIENT
Start: 2023-07-19 | End: 2023-07-20 | Stop reason: SDUPTHER

## 2023-07-19 NOTE — PROGRESS NOTES
I called patient and discussed the findings of her right foot MRI.  She has an oblique fracture of her 5th metatarsal that does not go into the joint space.  She continues to have pain in the foot.  I advised her she can continue to bear weight but recommend flatfoot weight-bearing activities low impact activities and avoid high impact activities.  I will send in a prescription for Norco 5/325.  I will see the patient back in 1 month with repeat x-rays the right foot.  Patient verbalized understanding.

## 2023-07-19 NOTE — PROGRESS NOTES
Patient needs 4 week follow up for a 5th Metatarsal fracture in her right foot.  Will need right foot xray.  Call her with date and time of appointment please.

## 2023-07-20 ENCOUNTER — PATIENT MESSAGE (OUTPATIENT)
Dept: SPORTS MEDICINE | Facility: CLINIC | Age: 63
End: 2023-07-20
Payer: COMMERCIAL

## 2023-07-20 ENCOUNTER — PATIENT MESSAGE (OUTPATIENT)
Dept: ORTHOPEDICS | Facility: CLINIC | Age: 63
End: 2023-07-20
Payer: COMMERCIAL

## 2023-07-20 DIAGNOSIS — S92.354D CLOSED NONDISPLACED FRACTURE OF FIFTH METATARSAL BONE OF RIGHT FOOT WITH ROUTINE HEALING, SUBSEQUENT ENCOUNTER: ICD-10-CM

## 2023-07-20 RX ORDER — HYDROCODONE BITARTRATE AND ACETAMINOPHEN 5; 325 MG/1; MG/1
1 TABLET ORAL EVERY 8 HOURS PRN
Qty: 21 TABLET | Refills: 0 | Status: SHIPPED | OUTPATIENT
Start: 2023-07-20 | End: 2023-08-24 | Stop reason: ALTCHOICE

## 2023-07-27 ENCOUNTER — OFFICE VISIT (OUTPATIENT)
Dept: SPORTS MEDICINE | Facility: CLINIC | Age: 63
End: 2023-07-27
Payer: COMMERCIAL

## 2023-07-27 VITALS
BODY MASS INDEX: 45.44 KG/M2 | WEIGHT: 246.94 LBS | SYSTOLIC BLOOD PRESSURE: 122 MMHG | HEIGHT: 62 IN | DIASTOLIC BLOOD PRESSURE: 82 MMHG | HEART RATE: 81 BPM

## 2023-07-27 DIAGNOSIS — M17.12 PRIMARY OSTEOARTHRITIS OF LEFT KNEE: Primary | ICD-10-CM

## 2023-07-27 PROCEDURE — 3074F SYST BP LT 130 MM HG: CPT | Mod: CPTII,S$GLB,, | Performed by: ORTHOPAEDIC SURGERY

## 2023-07-27 PROCEDURE — 3074F PR MOST RECENT SYSTOLIC BLOOD PRESSURE < 130 MM HG: ICD-10-PCS | Mod: CPTII,S$GLB,, | Performed by: ORTHOPAEDIC SURGERY

## 2023-07-27 PROCEDURE — 99999 PR PBB SHADOW E&M-EST. PATIENT-LVL III: ICD-10-PCS | Mod: PBBFAC,,, | Performed by: ORTHOPAEDIC SURGERY

## 2023-07-27 PROCEDURE — 3008F BODY MASS INDEX DOCD: CPT | Mod: CPTII,S$GLB,, | Performed by: ORTHOPAEDIC SURGERY

## 2023-07-27 PROCEDURE — 3079F DIAST BP 80-89 MM HG: CPT | Mod: CPTII,S$GLB,, | Performed by: ORTHOPAEDIC SURGERY

## 2023-07-27 PROCEDURE — 3044F HG A1C LEVEL LT 7.0%: CPT | Mod: CPTII,S$GLB,, | Performed by: ORTHOPAEDIC SURGERY

## 2023-07-27 PROCEDURE — 1159F PR MEDICATION LIST DOCUMENTED IN MEDICAL RECORD: ICD-10-PCS | Mod: CPTII,S$GLB,, | Performed by: ORTHOPAEDIC SURGERY

## 2023-07-27 PROCEDURE — 1159F MED LIST DOCD IN RCRD: CPT | Mod: CPTII,S$GLB,, | Performed by: ORTHOPAEDIC SURGERY

## 2023-07-27 PROCEDURE — 3008F PR BODY MASS INDEX (BMI) DOCUMENTED: ICD-10-PCS | Mod: CPTII,S$GLB,, | Performed by: ORTHOPAEDIC SURGERY

## 2023-07-27 PROCEDURE — 3044F PR MOST RECENT HEMOGLOBIN A1C LEVEL <7.0%: ICD-10-PCS | Mod: CPTII,S$GLB,, | Performed by: ORTHOPAEDIC SURGERY

## 2023-07-27 PROCEDURE — 4010F ACE/ARB THERAPY RXD/TAKEN: CPT | Mod: CPTII,S$GLB,, | Performed by: ORTHOPAEDIC SURGERY

## 2023-07-27 PROCEDURE — 99999 PR PBB SHADOW E&M-EST. PATIENT-LVL III: CPT | Mod: PBBFAC,,, | Performed by: ORTHOPAEDIC SURGERY

## 2023-07-27 PROCEDURE — 99214 PR OFFICE/OUTPT VISIT, EST, LEVL IV, 30-39 MIN: ICD-10-PCS | Mod: S$GLB,,, | Performed by: ORTHOPAEDIC SURGERY

## 2023-07-27 PROCEDURE — 99214 OFFICE O/P EST MOD 30 MIN: CPT | Mod: S$GLB,,, | Performed by: ORTHOPAEDIC SURGERY

## 2023-07-27 PROCEDURE — 3079F PR MOST RECENT DIASTOLIC BLOOD PRESSURE 80-89 MM HG: ICD-10-PCS | Mod: CPTII,S$GLB,, | Performed by: ORTHOPAEDIC SURGERY

## 2023-07-27 PROCEDURE — 4010F PR ACE/ARB THEARPY RXD/TAKEN: ICD-10-PCS | Mod: CPTII,S$GLB,, | Performed by: ORTHOPAEDIC SURGERY

## 2023-07-27 NOTE — PROGRESS NOTES
CC: Left knee pain    63 y.o. female presents for MRI review of left knee and is referred to clinic by Chao Castillo PA-C; see history below.  Concern for meniscus tear and chondromalacia. Patient does not report any new incidents or injuries since their last appointment. Pain and symptoms remain unchanged since his last appointment. Here today to discuss treatment options.       Interval Hx (Chao Castillo PA-C):   Patient is a 63-year-old female who presents today for follow-up evaluation of left knee pain.  She recently underwent an MRI of the left knee to evaluate for a possible medial meniscus tear.  Thus far treatment has included Tylenol, topical Voltaren, and tramadol for breakthrough pain.  She also received a corticosteroid injection which unfortunately did not provide much relief.  She has been been wearing a knee brace.  No new injuries since her last visit.  Pain is typically worse with prolonged walking/bearing weight and going up and down stairs.  Occasionally, she notices some mechanical catching when bending and straightening the knee.  Here today to discuss MRI results and treatment options moving forward.      Initial Hx (5/18/2023):  Patient is a 62-year-old female who presents today for initial evaluation of left knee pain.  She has been seen by me before for her left shoulder.  Patient reports that she began experiencing acute left knee pain a little over a week ago.  She is unaware of any recent falls, injuries, or trauma to the left knee that may have led to this.  She localizes the pain to the medial aspect of the left knee without radiation.  She describes it as a sharp, stabbing pain that is worse with bearing weight, bending the knee, going up and down stairs, and squatting.  She feels that the left knee has been somewhat swollen since this began.  She denies any subjective instability or mechanical grinding/catching in the left knee.  She sees a rheumatologist for osteoporosis and is on  Prolia, but she denies any other rheumatologic issues.  Due to her history of CKD, she is unable to take oral anti-inflammatories, and has been icing the knee and taking Tylenol for pain with limited relief.  No prior injuries or surgeries on her left knee.    + mechanical symptoms, - instability    Is affecting ADLs.  Pain is 5/10 at it's worst.    REVIEW OF SYSTEMS:  Constitution: Negative. Negative for chills, fever and night sweats.   HENT: Negative for congestion and headaches.    Eyes: Negative for blurred vision, left vision loss and right vision loss.   Cardiovascular: Negative for chest pain and syncope.   Respiratory: Negative for cough and shortness of breath.    Endocrine: Negative for polydipsia, polyphagia and polyuria.   Hematologic/Lymphatic: Negative for bleeding problem. Does not bruise/bleed easily.   Skin: Negative for dry skin, itching and rash.   Musculoskeletal: Negative for falls. Positive for left knee pain and  muscle weakness.   Gastrointestinal: Negative for abdominal pain and bowel incontinence.   Genitourinary: Negative for bladder incontinence and nocturia.   Neurological: Negative for disturbances in coordination, loss of balance and seizures.   Psychiatric/Behavioral: Negative for depression. The patient does not have insomnia.    Allergic/Immunologic: Negative for hives and persistent infections.     PAST MEDICAL HISTORY:    Past Medical History:   Diagnosis Date    BRCA1 negative     BRCA2 negative     CKD (chronic kidney disease), stage III 10/12/2021    Family history of breast cancer     Fibromyalgia     Hypertension     Hypothyroidism     Malignant hyperthermia due to anesthesia     Osteoporosis     Skin cancer 10/12/2021    Thyroid cancer 10/12/2021       PAST SURGICAL HISTORY:   Past Surgical History:   Procedure Laterality Date    CATARACT EXTRACTION, BILATERAL      CHOLECYSTECTOMY  05/1981    DILATION AND CURETTAGE OF UTERUS      SKIN CANCER EXCISION      TOTAL  THYROIDECTOMY  03/2002    TRIGGER FINGER RELEASE Right 11/2020       FAMILY HISTORY:   Family History   Problem Relation Age of Onset    Breast cancer Mother 74    Dementia Mother     Breast cancer Sister 51    Breast cancer Maternal Grandmother     Colon cancer Neg Hx     Ovarian cancer Neg Hx        SOCIAL HISTORY:   Social History     Socioeconomic History    Marital status:    Tobacco Use    Smoking status: Never    Smokeless tobacco: Never   Substance and Sexual Activity    Alcohol use: Never    Drug use: Never    Sexual activity: Yes     Partners: Male     Comment:        MEDICATIONS:     Current Outpatient Medications:     atorvastatin (LIPITOR) 20 MG tablet, Take 1 tablet (20 mg total) by mouth once daily., Disp: 30 tablet, Rfl: 5    calcium-vitamin D3 (OS-MICH 500 + D3) 500 mg-5 mcg (200 unit) per tablet, Take 1 tablet by mouth., Disp: , Rfl:     cetirizine 10 mg Cap, Take by mouth., Disp: , Rfl:     hydroCHLOROthiazide (HYDRODIURIL) 12.5 MG Tab, Take 1 tablet (12.5 mg total) by mouth once daily., Disp: 30 tablet, Rfl: 5    HYDROcodone-acetaminophen (NORCO) 5-325 mg per tablet, Take 1 tablet by mouth every 8 (eight) hours as needed for Pain., Disp: 21 tablet, Rfl: 0    losartan (COZAAR) 100 MG tablet, 1 po daily, Disp: 30 tablet, Rfl: 5    metoprolol succinate (TOPROL-XL) 50 MG 24 hr tablet, TAKE 1 TABLET BY MOUTH EVERY DAY, Disp: 90 tablet, Rfl: 2    pregabalin (LYRICA) 50 MG capsule, Take 50 mg by mouth 3 (three) times daily., Disp: , Rfl:     SYNTHROID 200 mcg tablet, Take one tablet 6 days a week and 1/2 tablet on Weds, Disp: , Rfl:     traMADoL (ULTRAM) 50 mg tablet, Take 1 tablet (50 mg total) by mouth every 8 (eight) hours as needed for Pain., Disp: 21 tablet, Rfl: 0    venlafaxine (EFFEXOR-XR) 150 MG Cp24, Take 1 capsule (150 mg total) by mouth every evening. With 37.5mg capsule. Total daily dose 187.5mg, Disp: 90 capsule, Rfl: 1    venlafaxine (EFFEXOR-XR) 37.5 MG 24 hr capsule, Take 1  "capsule (37.5 mg total) by mouth once daily. With 150mg. Total daily dose 187.5mg, Disp: 90 capsule, Rfl: 1    denosumab (PROLIA) 60 mg/mL Syrg, Inject 1 mL (60 mg total) into the skin every 6 (six) months., Disp: , Rfl:     ALLERGIES:   Review of patient's allergies indicates:   Allergen Reactions    Nsaids (non-steroidal anti-inflammatory drug)     Neosporin (neomycin-polymyx) Rash       VITAL SIGNS:   /82   Pulse 81   Ht 5' 2" (1.575 m)   Wt 112 kg (246 lb 14.6 oz)   BMI 45.16 kg/m²      PHYSICAL EXAMINATION  General:  The patient is alert and oriented x 3.  Mood is pleasant.  Observation of ears, eyes and nose reveal no gross abnormalities.  No labored breathing observed.    LEFT KNEE EXAMINATION     OBSERVATION / INSPECTION   Gait:   antalgic   Alignment:  Neutral   Scars:   None   Muscle atrophy: Mild  Effusion:  1+   Warmth:  None   Discoloration:   none     TENDERNESS / CREPITUS (T / C):          T / C      T / C   Patella   - / -   Lateral joint line   - / -   Peripatellar medial  -  Medial joint line    + / -   Peripatellar lateral -  Medial plica   - / -   Patellar tendon -   Popliteal fossa   - / -   Quad tendon   -   Gastrocnemius   -   Prepatellar Bursa - / -   Quadricep   -   Tibial tubercle  -  Thigh/hamstring  -   Pes anserine/HS +  Fibula    -   ITB   - / -  Tibia     -   Tib/fib joint  - / -  LCL    -     MFC   - / -   MCL: Proximal  -    LFC   - / -    Distal   +          ROM: (* = pain)  PASSIVE   ACTIVE      Left :   5 / 0 / 100*   5 / 0 / 100*     Right :    5 / 0 / 120   5 / 0 / 120    Patellofemoral examination:  See above noted areas of tenderness.   Patella position    Subluxation / dislocation: Centered           Sup. / Inf;   Normal   Crepitus (PF):    Absent   Patellar Mobility:       Medial-lateral:   Normal    Superior-inferior:  Normal    Inferior tilt   Normal    Patellar tendon:  Normal   Lateral tilt:    Normal   J-sign:     None   Patellofemoral grind:   No pain "       MENISCAL SIGNS:     Pain on terminal extension:  -  Pain on terminal flexion:  +  Mary Ellens maneuver:  + (for pain)  Squat     deferred    LIGAMENT EXAMINATION:  ACL / Lachman:  normal (-1 to 2mm)    PCL-Post.  drawer: normal 0 to 2mm  MCL- Valgus:  normal 0 to 2mm  LCL- Varus:  normal 0 to 2mm  Pivot shift: normal (Equal)   Dial Test: difference c/w other side   At 30° flexion: normal (< 5°)    At 90° flexion: normal (< 5°)   Reverse Pivot Shift:   normal (Equal)     STRENGTH: (* = with pain) PAINFUL SIDE   Quadricep   4/5   Hamstrin/5    EXTREMITY NEURO-VASCULAR EXAMINATION:   Sensation:  Grossly intact to light touch all dermatomal regions.   Motor Function:  Fully intact motor function at hip, knee, foot and ankle    DTRs;  quadriceps and  achilles 2+.  No clonus and downgoing Babinski.    Vascular status:  DP and PT pulses 2+, brisk capillary refill, symmetric.     OTHER FINDINGS:  N/A    X-rays bilateral knees (2023):      FINDINGS:  Bones are demineralized.  Mild narrowing at the femoral tibial joints.  Small left effusion.  May be small right effusion.  Narrowing of the patellofemoral joints.    Kellgren magy grade 2 bilaterally    MRI left knee (2023):  MRI images of the left knee ordered and independently interpreted by me show:  Complex tear of the medial meniscus with extrusion into the superior medial gutter.  Edema of the MCL which could be reactive next to adjacent medial meniscus tear versus a mild sprain.  Chondromalacia of the patellofemoral and medial tibiofemoral compartments.       ASSESSMENT:    Left knee pain  Complex tear of medial meniscus, left knee  Chondromalacia of left knee  Mild primary osteoarthritis of left knee    PLAN:     1. Visco injection     2. F/u for injections.     All questions were answered, pt will contact us for questions or concerns in the interim.

## 2023-08-01 DIAGNOSIS — F41.9 ANXIETY: ICD-10-CM

## 2023-08-01 DIAGNOSIS — F32.A DEPRESSIVE DISORDER: ICD-10-CM

## 2023-08-01 RX ORDER — VENLAFAXINE HYDROCHLORIDE 150 MG/1
150 CAPSULE, EXTENDED RELEASE ORAL NIGHTLY
Qty: 90 CAPSULE | Refills: 1 | Status: SHIPPED | OUTPATIENT
Start: 2023-08-01 | End: 2024-02-01 | Stop reason: SDUPTHER

## 2023-08-01 RX ORDER — VENLAFAXINE HYDROCHLORIDE 37.5 MG/1
CAPSULE, EXTENDED RELEASE ORAL
Qty: 90 CAPSULE | Refills: 1 | Status: SHIPPED | OUTPATIENT
Start: 2023-08-01 | End: 2024-01-31

## 2023-08-01 NOTE — TELEPHONE ENCOUNTER
Refill Authorization Note     Refill Decision Note   Bree Gonzales  is requesting a refill authorization.  Brief Assessment and Rationale for Refill:  Approve     Medication Therapy Plan:       Medication Reconciliation Completed: No   Comments:     No Care Gaps recommended.     Note composed:8:02 AM 08/01/2023           Mastoid Interpolation Flap Text: A decision was made to reconstruct the defect utilizing an interpolation axial flap and a staged reconstruction.  A telfa template was made of the defect.  This telfa template was then used to outline the mastoid interpolation flap.  The donor area for the pedicle flap was then injected with anesthesia.  The flap was excised through the skin and subcutaneous tissue down to the layer of the underlying musculature.  The pedicle flap was carefully excised within this deep plane to maintain its blood supply.  The edges of the donor site were undermined.   The donor site was closed in a primary fashion.  The pedicle was then rotated into position and sutured.  Once the tube was sutured into place, adequate blood supply was confirmed with blanching and refill.  The pedicle was then wrapped with xeroform gauze and dressed appropriately with a telfa and gauze bandage to ensure continued blood supply and protect the attached pedicle.

## 2023-08-01 NOTE — TELEPHONE ENCOUNTER
No care due was identified.  [facility] Embedded Care Due Messages. Reference number: [id number].   8/01/2023 12:08:00 AM CDT

## 2023-08-10 ENCOUNTER — OFFICE VISIT (OUTPATIENT)
Dept: SPORTS MEDICINE | Facility: CLINIC | Age: 63
End: 2023-08-10
Payer: COMMERCIAL

## 2023-08-10 VITALS
WEIGHT: 246 LBS | DIASTOLIC BLOOD PRESSURE: 84 MMHG | HEART RATE: 92 BPM | BODY MASS INDEX: 45.27 KG/M2 | HEIGHT: 62 IN | SYSTOLIC BLOOD PRESSURE: 116 MMHG

## 2023-08-10 DIAGNOSIS — M17.12 PRIMARY OSTEOARTHRITIS OF LEFT KNEE: Primary | ICD-10-CM

## 2023-08-10 PROCEDURE — 20610 LARGE JOINT ASPIRATION/INJECTION: L KNEE: ICD-10-PCS | Mod: LT,S$GLB,, | Performed by: PHYSICIAN ASSISTANT

## 2023-08-10 PROCEDURE — 99999 PR PBB SHADOW E&M-EST. PATIENT-LVL IV: ICD-10-PCS | Mod: PBBFAC,,, | Performed by: PHYSICIAN ASSISTANT

## 2023-08-10 PROCEDURE — 99499 NO LOS: ICD-10-PCS | Mod: S$GLB,,, | Performed by: PHYSICIAN ASSISTANT

## 2023-08-10 PROCEDURE — 99999 PR PBB SHADOW E&M-EST. PATIENT-LVL IV: CPT | Mod: PBBFAC,,, | Performed by: PHYSICIAN ASSISTANT

## 2023-08-10 PROCEDURE — 99499 UNLISTED E&M SERVICE: CPT | Mod: S$GLB,,, | Performed by: PHYSICIAN ASSISTANT

## 2023-08-10 PROCEDURE — 20610 DRAIN/INJ JOINT/BURSA W/O US: CPT | Mod: LT,S$GLB,, | Performed by: PHYSICIAN ASSISTANT

## 2023-08-10 NOTE — PROCEDURES
Large Joint Aspiration/Injection: L knee    Date/Time: 8/10/2023 2:00 PM    Performed by: Chao Castillo PA-C  Authorized by: Chao Castillo PA-C    Consent Done?:  Yes (Verbal)  Indications:  Pain and arthritis  Site marked: the procedure site was marked    Timeout: prior to procedure the correct patient, procedure, and site was verified    Prep: patient was prepped and draped in usual sterile fashion    Local anesthesia used?: No      Details:  Needle Size:  22 G  Ultrasonic Guidance for needle placement?: No    Approach:  Anterolateral  Location:  Knee  Site:  L knee  Medications:  20 mg sodium hyaluronate (EUFLEXXA) 10 mg/mL(mw 2.4 -3.6 million)  Patient tolerance:  Patient tolerated the procedure well with no immediate complications

## 2023-08-10 NOTE — PROGRESS NOTES
Patient is here for follow up of left knee arthritis. Pt is requesting Euflexxa injection #1.  PMFH reviewed per encounter record. Has failed other conservative modalities including NSAIDS, activity modification, weight loss.        PHYSICAL EXAMINATION:      General: The patient is alert and oriented x 3. Mood is pleasant.   Observation of ears, eyes and nose reveals no gross abnormalities. No   labored breathing observed.      No signs of infection or adverse reaction to knee.    X-rays:  Kellgren Henry grade 2    Euflexxa Injection Procedure #1 - left knee    A time out was performed, including verification of patient ID, procedure, site and side, availability of information and equipment, review of safety issues, and agreement with consent, the procedure site was marked.    The patient was prepped aseptically with povidone-iodine swabsticks. A diagnostic and therapeutic injection of 2cc Euflexxa was given under sterile technique using a 22g x 1.5 needle from the anterolateral aspect of the left knee Joint in the supine position.   Bree Gonzales had no adverse reactions to the medication. Pain decreased. female was instructed to apply ice to the joint for 20 minutes and avoid strenuous activities for 24-36 hours following the injection. female was warned of possible blood pressure changes during that time. Following that time, female can resume activities as prior to the injection.    female was reminded to call the clinic immediately for any adverse side effects as explained in clinic today.    Exp:  9/7/2024  Lot:  [id number]

## 2023-08-16 ENCOUNTER — OFFICE VISIT (OUTPATIENT)
Dept: ORTHOPEDICS | Facility: CLINIC | Age: 63
End: 2023-08-16
Payer: COMMERCIAL

## 2023-08-16 ENCOUNTER — HOSPITAL ENCOUNTER (OUTPATIENT)
Dept: RADIOLOGY | Facility: HOSPITAL | Age: 63
Discharge: HOME | End: 2023-08-16
Attending: NURSE PRACTITIONER
Payer: COMMERCIAL

## 2023-08-16 DIAGNOSIS — S92.354D CLOSED NONDISPLACED FRACTURE OF FIFTH METATARSAL BONE OF RIGHT FOOT WITH ROUTINE HEALING, SUBSEQUENT ENCOUNTER: Primary | ICD-10-CM

## 2023-08-16 DIAGNOSIS — M79.671 RIGHT FOOT PAIN: ICD-10-CM

## 2023-08-16 DIAGNOSIS — M79.671 RIGHT FOOT PAIN: Primary | ICD-10-CM

## 2023-08-16 PROCEDURE — 4010F PR ACE/ARB THEARPY RXD/TAKEN: ICD-10-PCS | Mod: CPTII,S$GLB,, | Performed by: NURSE PRACTITIONER

## 2023-08-16 PROCEDURE — 99213 OFFICE O/P EST LOW 20 MIN: CPT | Mod: S$GLB,,, | Performed by: NURSE PRACTITIONER

## 2023-08-16 PROCEDURE — 99213 PR OFFICE/OUTPT VISIT, EST, LEVL III, 20-29 MIN: ICD-10-PCS | Mod: S$GLB,,, | Performed by: NURSE PRACTITIONER

## 2023-08-16 PROCEDURE — 1160F PR REVIEW ALL MEDS BY PRESCRIBER/CLIN PHARMACIST DOCUMENTED: ICD-10-PCS | Mod: CPTII,S$GLB,, | Performed by: NURSE PRACTITIONER

## 2023-08-16 PROCEDURE — 99999 PR PBB SHADOW E&M-EST. PATIENT-LVL III: CPT | Mod: PBBFAC,,, | Performed by: NURSE PRACTITIONER

## 2023-08-16 PROCEDURE — 3044F PR MOST RECENT HEMOGLOBIN A1C LEVEL <7.0%: ICD-10-PCS | Mod: CPTII,S$GLB,, | Performed by: NURSE PRACTITIONER

## 2023-08-16 PROCEDURE — 1160F RVW MEDS BY RX/DR IN RCRD: CPT | Mod: CPTII,S$GLB,, | Performed by: NURSE PRACTITIONER

## 2023-08-16 PROCEDURE — 73630 X-RAY EXAM OF FOOT: CPT | Mod: TC,RT

## 2023-08-16 PROCEDURE — 3044F HG A1C LEVEL LT 7.0%: CPT | Mod: CPTII,S$GLB,, | Performed by: NURSE PRACTITIONER

## 2023-08-16 PROCEDURE — 73630 X-RAY EXAM OF FOOT: CPT | Mod: 26,RT,, | Performed by: RADIOLOGY

## 2023-08-16 PROCEDURE — 99999 PR PBB SHADOW E&M-EST. PATIENT-LVL III: ICD-10-PCS | Mod: PBBFAC,,, | Performed by: NURSE PRACTITIONER

## 2023-08-16 PROCEDURE — 1159F PR MEDICATION LIST DOCUMENTED IN MEDICAL RECORD: ICD-10-PCS | Mod: CPTII,S$GLB,, | Performed by: NURSE PRACTITIONER

## 2023-08-16 PROCEDURE — 1159F MED LIST DOCD IN RCRD: CPT | Mod: CPTII,S$GLB,, | Performed by: NURSE PRACTITIONER

## 2023-08-16 PROCEDURE — 73630 XR FOOT COMPLETE 3 VIEW RIGHT: ICD-10-PCS | Mod: 26,RT,, | Performed by: RADIOLOGY

## 2023-08-16 PROCEDURE — 4010F ACE/ARB THERAPY RXD/TAKEN: CPT | Mod: CPTII,S$GLB,, | Performed by: NURSE PRACTITIONER

## 2023-08-16 NOTE — PROGRESS NOTES
SUBJECTIVE:     Chief Complaint & History of Present Illness:  Bree Gonzales is a Established 63 y.o. year old female patient here for follow up for her 5th MT fracture.  She was last seen in the clinic on 7/10/23.  At that time x-ray indicated possible stress fracture in her 5th metatarsal.  Patient continued to have pain and requested a MRI.  MRI was done on July 19, 2023 which confirmed the fracture of the 5th metatarsal without intra-articular extension.  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  Patient was advised to continue current treatment plan as outlined in the clinic visit and follow up in 4 weeks.    Currently the patient reports her pain is improving.  She is back in regular shoes.  She is seen [facility] for her left knee pain where she is getting gel injections.  Denies any falls or injuries since last seen in clinic by me.  She has chronic neuropathy to her feet on chronic gabapentin for this.    Review of patient's allergies indicates:   Allergen Reactions    Nsaids (non-steroidal anti-inflammatory drug)     Neosporin (neomycin-polymyx) Rash         Current Outpatient Medications   Medication Sig Dispense Refill    atorvastatin (LIPITOR) 20 MG tablet Take 1 tablet (20 mg total) by mouth once daily. 30 tablet 5    calcium-vitamin D3 (OS-MICH 500 + D3) 500 mg-5 mcg (200 unit) per tablet Take 1 tablet by mouth.      cetirizine 10 mg Cap Take by mouth.      denosumab (PROLIA) 60 mg/mL Syrg Inject 1 mL (60 mg total) into the skin every 6 (six) months.      hydroCHLOROthiazide (HYDRODIURIL) 12.5 MG Tab Take 1 tablet (12.5 mg total) by mouth once daily. 30 tablet 5    HYDROcodone-acetaminophen (NORCO) 5-325 mg per tablet Take 1 tablet by mouth every 8 (eight) hours as needed for Pain. (Patient not taking: Reported on 8/10/2023) 21 tablet 0    losartan (COZAAR) 100 MG tablet 1 po daily 30 tablet 5    metoprolol succinate (TOPROL-XL) 50 MG 24 hr tablet TAKE 1 TABLET BY  MOUTH EVERY DAY 90 tablet 2    pregabalin (LYRICA) 50 MG capsule Take 50 mg by mouth 3 (three) times daily.      SYNTHROID 200 mcg tablet Take one tablet 6 days a week and 1/2 tablet on Weds      traMADoL (ULTRAM) 50 mg tablet Take 1 tablet (50 mg total) by mouth every 8 (eight) hours as needed for Pain. (Patient not taking: Reported on 8/10/2023) 21 tablet 0    venlafaxine (EFFEXOR-XR) 150 MG Cp24 TAKE 1 CAPSULE (150 MG TOTAL) BY MOUTH EVERY EVENING. WITH 37.5MG CAPSULE. TOTAL DAILY DOSE 187.5MG 90 capsule 1    venlafaxine (EFFEXOR-XR) 37.5 MG 24 hr capsule TAKE 1 CAPSULE BY MOUTH EVERY DAY WITH 150 MG FOR A TOTAL DAILY DOSE .5 90 capsule 1     No current facility-administered medications for this visit.       Past Medical History:   Diagnosis Date    BRCA1 negative     BRCA2 negative     CKD (chronic kidney disease), stage III 10/12/2021    Family history of breast cancer     Fibromyalgia     Hypertension     Hypothyroidism     Malignant hyperthermia due to anesthesia     Osteoporosis     Skin cancer 10/12/2021    Thyroid cancer 10/12/2021       Past Surgical History:   Procedure Laterality Date    CATARACT EXTRACTION, BILATERAL      CHOLECYSTECTOMY  05/1981    DILATION AND CURETTAGE OF UTERUS      SKIN CANCER EXCISION      TOTAL THYROIDECTOMY  03/2002    TRIGGER FINGER RELEASE Right 11/2020       Family History   Problem Relation Age of Onset    Breast cancer Mother 74    Dementia Mother     Breast cancer Sister 51    Breast cancer Maternal Grandmother     Colon cancer Neg Hx     Ovarian cancer Neg Hx          Review of Systems:  ROS:  Constitutional: no fever or chills  Eyes: no visual changes  ENT: no nasal congestion or sore throat  Respiratory: no cough or shortness of breath  Cardiovascular: no chest pain or palpitations  Gastrointestinal: no nausea or vomiting, tolerating diet  Genitourinary: no hematuria or dysuria  Integument/Breast: no rash or pruritis  Hematologic/Lymphatic: no easy bruising or  "lymphadenopathy  Musculoskeletal:  5th metatarsal fracture on the right  Neurological: no seizures or tremors  Behavioral/Psych: no auditory or visual hallucinations  Endocrine: no heat or cold intolerance      OBJECTIVE:     PHYSICAL EXAM:  Vital Signs (Most Recent)  There were no vitals filed for this visit.     ,   Estimated body mass index is 44.99 kg/m² as calculated from the following:    Height as of 8/10/23: 5' 2" (1.575 m).    Weight as of 8/10/23: 111.6 kg (246 lb).   General Appearance: Well nourished, well developed, in no acute distress.  HENT: Normal cephalic, oropharynx pink and moist  Eyes: PERRLA bilaterally and EOM x 4  Respiratory: Even and unlabored  Skin: Warm and Dry.   Psychiatric: AAO x 4, Mood & affect are normal.    right  Foot/Ankle    General appearance: no acute distress, alert/oriented x3, appropriate mood and affect, looks stated age, obese, and well nourished  The examination was performed out of splint/cast  Skin: normal  Swelling: none  Warmth: no warmth  Tenderness:  None  ROM:  25° dorsiflexion and plantar flexion and 20° inversion and eversion  Strength: normal  Gait:  Normal  Stability: stable to testing and Cotton test: negative  Crepitus: no  Neurological Exam: normal  Vascular Exam: normal and pulse present    normal exam, no swelling, tenderness, instability; ligaments intact, full range of motion of all ankle/foot joints      RADIOGRAPHS:  X-ray of the right foot was obtained, there is a proximal 5th metatarsal fracture that appears stable when compared to prior x-ray.  There is beginning callus formation.  No other fracture seen.  All radiographs were personally reviewed by me.    ASSESSMENT/PLAN:       ICD-10-CM ICD-9-CM   1. Closed nondisplaced fracture of fifth metatarsal bone of right foot with routine healing, subsequent encounter  S92.354D V54.19       Plan:  -Bree Gonzales presents to clinic today with c/c right 5th metatarsal fracture x 5 weeks.  -X-ray as " above.    I discussed the x-ray findings with the patient.  Her symptoms are slowly improving.  She reports less pain in the foot.  She has more left knee pain which she is seen [facility].    I recommend that she continue low-impact activity for the next 4 weeks and then may slowly increase her activity as tolerated.  I advised the patient let pain be her guide.    Patient to follow up in the trauma clinic p.r.n..

## 2023-08-17 ENCOUNTER — OFFICE VISIT (OUTPATIENT)
Dept: SPORTS MEDICINE | Facility: CLINIC | Age: 63
End: 2023-08-17
Payer: COMMERCIAL

## 2023-08-17 VITALS
SYSTOLIC BLOOD PRESSURE: 127 MMHG | HEART RATE: 102 BPM | DIASTOLIC BLOOD PRESSURE: 84 MMHG | WEIGHT: 246 LBS | HEIGHT: 62 IN | BODY MASS INDEX: 45.27 KG/M2

## 2023-08-17 DIAGNOSIS — M17.12 PRIMARY OSTEOARTHRITIS OF LEFT KNEE: Primary | ICD-10-CM

## 2023-08-17 PROCEDURE — 99999 PR PBB SHADOW E&M-EST. PATIENT-LVL IV: ICD-10-PCS | Mod: PBBFAC,,, | Performed by: PHYSICIAN ASSISTANT

## 2023-08-17 PROCEDURE — 20610 DRAIN/INJ JOINT/BURSA W/O US: CPT | Mod: LT,S$GLB,, | Performed by: PHYSICIAN ASSISTANT

## 2023-08-17 PROCEDURE — 20610 LARGE JOINT ASPIRATION/INJECTION: L KNEE: ICD-10-PCS | Mod: LT,S$GLB,, | Performed by: PHYSICIAN ASSISTANT

## 2023-08-17 PROCEDURE — 99999 PR PBB SHADOW E&M-EST. PATIENT-LVL IV: CPT | Mod: PBBFAC,,, | Performed by: PHYSICIAN ASSISTANT

## 2023-08-17 PROCEDURE — 99499 NO LOS: ICD-10-PCS | Mod: S$GLB,,, | Performed by: PHYSICIAN ASSISTANT

## 2023-08-17 PROCEDURE — 99499 UNLISTED E&M SERVICE: CPT | Mod: S$GLB,,, | Performed by: PHYSICIAN ASSISTANT

## 2023-08-17 NOTE — PROCEDURES
Large Joint Aspiration/Injection: L knee    Date/Time: 8/17/2023 9:30 AM    Performed by: Chao Castillo PA-C  Authorized by: Chao Castillo PA-C    Consent Done?:  Yes (Verbal)  Indications:  Pain and arthritis  Site marked: the procedure site was marked    Timeout: prior to procedure the correct patient, procedure, and site was verified    Prep: patient was prepped and draped in usual sterile fashion    Local anesthesia used?: No      Details:  Needle Size:  22 G  Ultrasonic Guidance for needle placement?: No    Approach:  Anterolateral  Location:  Knee  Site:  L knee  Medications:  20 mg sodium hyaluronate (EUFLEXXA) 10 mg/mL(mw 2.4 -3.6 million)  Patient tolerance:  Patient tolerated the procedure well with no immediate complications

## 2023-08-17 NOTE — PROGRESS NOTES
Patient is here for follow up of left knee arthritis. Pt is requesting Euflexxa injection #2.  PMFH reviewed per encounter record. Has failed other conservative modalities including NSAIDS, activity modification, weight loss.        PHYSICAL EXAMINATION:      General: The patient is alert and oriented x 3. Mood is pleasant.   Observation of ears, eyes and nose reveals no gross abnormalities. No   labored breathing observed.      No signs of infection or adverse reaction to knee.    X-rays:  Kellgren Henry grade 2    Euflexxa Injection Procedure #2 - left knee    A time out was performed, including verification of patient ID, procedure, site and side, availability of information and equipment, review of safety issues, and agreement with consent, the procedure site was marked.    The patient was prepped aseptically with povidone-iodine swabsticks. A diagnostic and therapeutic injection of 2cc Euflexxa was given under sterile technique using a 22g x 1.5 needle from the anterolateral aspect of the left knee Joint in the supine position.   Bree Gonzales had no adverse reactions to the medication. Pain decreased. female was instructed to apply ice to the joint for 20 minutes and avoid strenuous activities for 24-36 hours following the injection. female was warned of possible blood pressure changes during that time. Following that time, female can resume activities as prior to the injection.    female was reminded to call the clinic immediately for any adverse side effects as explained in clinic today.    Exp:  9/7/2024  Lot:  [id number]

## 2023-08-22 ENCOUNTER — TELEPHONE (OUTPATIENT)
Dept: SPORTS MEDICINE | Facility: CLINIC | Age: 63
End: 2023-08-22
Payer: COMMERCIAL

## 2023-08-22 ENCOUNTER — PATIENT MESSAGE (OUTPATIENT)
Dept: SPORTS MEDICINE | Facility: CLINIC | Age: 63
End: 2023-08-22
Payer: COMMERCIAL

## 2023-08-22 NOTE — TELEPHONE ENCOUNTER
Sent [employer] message. Patient did not respond. Offered her appt today.    Called and spoke to patient. She says she is coming in two days so she does not need to come today.      Subject:Knee pain     Good morning,   I am having severe pain on the side of my knee. I took a pain med and it never stopped the pain. It is very hard to walk.   Thank you,   Bree Gonzales

## 2023-08-24 ENCOUNTER — OFFICE VISIT (OUTPATIENT)
Dept: SPORTS MEDICINE | Facility: CLINIC | Age: 63
End: 2023-08-24
Payer: COMMERCIAL

## 2023-08-24 VITALS
DIASTOLIC BLOOD PRESSURE: 92 MMHG | HEART RATE: 89 BPM | BODY MASS INDEX: 45.27 KG/M2 | HEIGHT: 62 IN | WEIGHT: 246 LBS | SYSTOLIC BLOOD PRESSURE: 125 MMHG

## 2023-08-24 DIAGNOSIS — M17.12 PRIMARY OSTEOARTHRITIS OF LEFT KNEE: Primary | ICD-10-CM

## 2023-08-24 DIAGNOSIS — M25.562 ACUTE PAIN OF LEFT KNEE: ICD-10-CM

## 2023-08-24 PROCEDURE — 99999 PR PBB SHADOW E&M-EST. PATIENT-LVL III: ICD-10-PCS | Mod: PBBFAC,,, | Performed by: PHYSICIAN ASSISTANT

## 2023-08-24 PROCEDURE — 99499 NO LOS: ICD-10-PCS | Mod: S$GLB,,, | Performed by: PHYSICIAN ASSISTANT

## 2023-08-24 PROCEDURE — 99999 PR PBB SHADOW E&M-EST. PATIENT-LVL III: CPT | Mod: PBBFAC,,, | Performed by: PHYSICIAN ASSISTANT

## 2023-08-24 PROCEDURE — 99499 UNLISTED E&M SERVICE: CPT | Mod: S$GLB,,, | Performed by: PHYSICIAN ASSISTANT

## 2023-08-24 PROCEDURE — 20610 LARGE JOINT ASPIRATION/INJECTION: L KNEE: ICD-10-PCS | Mod: LT,S$GLB,, | Performed by: PHYSICIAN ASSISTANT

## 2023-08-24 PROCEDURE — 20610 DRAIN/INJ JOINT/BURSA W/O US: CPT | Mod: LT,S$GLB,, | Performed by: PHYSICIAN ASSISTANT

## 2023-08-24 RX ORDER — TRAMADOL HYDROCHLORIDE 50 MG/1
50-100 TABLET ORAL EVERY 8 HOURS PRN
Qty: 21 TABLET | Refills: 0 | Status: SHIPPED | OUTPATIENT
Start: 2023-08-24 | End: 2024-03-18

## 2023-08-24 NOTE — PROGRESS NOTES
Patient is here for follow up of left knee arthritis. Pt is requesting Euflexxa injection #3.  PMFH reviewed per encounter record. Has failed other conservative modalities including NSAIDS, activity modification, weight loss.        PHYSICAL EXAMINATION:      General: The patient is alert and oriented x 3. Mood is pleasant.   Observation of ears, eyes and nose reveals no gross abnormalities. No   labored breathing observed.      No signs of infection or adverse reaction to knee.    X-rays:  Kellgren Henry grade 2    Euflexxa Injection Procedure #3 - left knee    A time out was performed, including verification of patient ID, procedure, site and side, availability of information and equipment, review of safety issues, and agreement with consent, the procedure site was marked.    The patient was prepped aseptically with povidone-iodine swabsticks. A diagnostic and therapeutic injection of 2cc Euflexxa was given under sterile technique using a 22g x 1.5 needle from the anterolateral aspect of the left knee Joint in the supine position.   Bree Gonzales had no adverse reactions to the medication. Pain decreased. female was instructed to apply ice to the joint for 20 minutes and avoid strenuous activities for 24-36 hours following the injection. female was warned of possible blood pressure changes during that time. Following that time, female can resume activities as prior to the injection.    female was reminded to call the clinic immediately for any adverse side effects as explained in clinic today.    Exp:  9/2/2024  Lot:  [id number]

## 2023-08-24 NOTE — PROCEDURES
Large Joint Aspiration/Injection: L knee    Date/Time: 8/24/2023 9:30 AM    Performed by: Chao Castillo PA-C  Authorized by: Chao Castillo PA-C    Consent Done?:  Yes (Verbal)  Indications:  Pain and arthritis  Site marked: the procedure site was marked    Timeout: prior to procedure the correct patient, procedure, and site was verified    Prep: patient was prepped and draped in usual sterile fashion    Local anesthesia used?: No      Details:  Needle Size:  22 G  Ultrasonic Guidance for needle placement?: No    Approach:  Anterolateral  Location:  Knee  Site:  L knee  Medications:  20 mg sodium hyaluronate (EUFLEXXA) 10 mg/mL(mw 2.4 -3.6 million)  Patient tolerance:  Patient tolerated the procedure well with no immediate complications

## 2023-09-11 ENCOUNTER — PATIENT OUTREACH (OUTPATIENT)
Dept: ADMINISTRATIVE | Facility: HOSPITAL | Age: 63
End: 2023-09-11
Payer: COMMERCIAL

## 2023-09-11 ENCOUNTER — OFFICE VISIT (OUTPATIENT)
Dept: PRIMARY CARE CLINIC | Facility: CLINIC | Age: 63
End: 2023-09-11
Payer: COMMERCIAL

## 2023-09-11 VITALS
HEART RATE: 95 BPM | TEMPERATURE: 98 F | DIASTOLIC BLOOD PRESSURE: 84 MMHG | WEIGHT: 242.5 LBS | BODY MASS INDEX: 44.63 KG/M2 | SYSTOLIC BLOOD PRESSURE: 130 MMHG | HEIGHT: 62 IN | OXYGEN SATURATION: 96 % | RESPIRATION RATE: 18 BRPM

## 2023-09-11 DIAGNOSIS — F41.9 ANXIETY: ICD-10-CM

## 2023-09-11 DIAGNOSIS — M81.0 OSTEOPOROSIS, UNSPECIFIED OSTEOPOROSIS TYPE, UNSPECIFIED PATHOLOGICAL FRACTURE PRESENCE: ICD-10-CM

## 2023-09-11 DIAGNOSIS — M79.7 FIBROMYALGIA: ICD-10-CM

## 2023-09-11 DIAGNOSIS — N18.31 STAGE 3A CHRONIC KIDNEY DISEASE: ICD-10-CM

## 2023-09-11 DIAGNOSIS — E78.00 HYPERCHOLESTEROLEMIA: ICD-10-CM

## 2023-09-11 DIAGNOSIS — E89.0 POSTOPERATIVE HYPOTHYROIDISM: ICD-10-CM

## 2023-09-11 DIAGNOSIS — I10 HYPERTENSION, UNSPECIFIED TYPE: Primary | ICD-10-CM

## 2023-09-11 DIAGNOSIS — R20.0 NUMBNESS AND TINGLING: ICD-10-CM

## 2023-09-11 DIAGNOSIS — F32.A DEPRESSIVE DISORDER: ICD-10-CM

## 2023-09-11 DIAGNOSIS — G60.9 IDIOPATHIC PERIPHERAL NEUROPATHY: ICD-10-CM

## 2023-09-11 DIAGNOSIS — R20.2 NUMBNESS AND TINGLING: ICD-10-CM

## 2023-09-11 DIAGNOSIS — E66.01 MORBID OBESITY WITH BMI OF 40.0-44.9, ADULT: ICD-10-CM

## 2023-09-11 PROCEDURE — 1160F RVW MEDS BY RX/DR IN RCRD: CPT | Mod: CPTII,S$GLB,, | Performed by: INTERNAL MEDICINE

## 2023-09-11 PROCEDURE — 3075F SYST BP GE 130 - 139MM HG: CPT | Mod: CPTII,S$GLB,, | Performed by: INTERNAL MEDICINE

## 2023-09-11 PROCEDURE — 99999 PR PBB SHADOW E&M-EST. PATIENT-LVL V: CPT | Mod: PBBFAC,,, | Performed by: INTERNAL MEDICINE

## 2023-09-11 PROCEDURE — 3044F PR MOST RECENT HEMOGLOBIN A1C LEVEL <7.0%: ICD-10-PCS | Mod: CPTII,S$GLB,, | Performed by: INTERNAL MEDICINE

## 2023-09-11 PROCEDURE — 3044F HG A1C LEVEL LT 7.0%: CPT | Mod: CPTII,S$GLB,, | Performed by: INTERNAL MEDICINE

## 2023-09-11 PROCEDURE — 3008F BODY MASS INDEX DOCD: CPT | Mod: CPTII,S$GLB,, | Performed by: INTERNAL MEDICINE

## 2023-09-11 PROCEDURE — 4010F ACE/ARB THERAPY RXD/TAKEN: CPT | Mod: CPTII,S$GLB,, | Performed by: INTERNAL MEDICINE

## 2023-09-11 PROCEDURE — 99214 PR OFFICE/OUTPT VISIT, EST, LEVL IV, 30-39 MIN: ICD-10-PCS | Mod: S$GLB,,, | Performed by: INTERNAL MEDICINE

## 2023-09-11 PROCEDURE — 3075F PR MOST RECENT SYSTOLIC BLOOD PRESS GE 130-139MM HG: ICD-10-PCS | Mod: CPTII,S$GLB,, | Performed by: INTERNAL MEDICINE

## 2023-09-11 PROCEDURE — 99999 PR PBB SHADOW E&M-EST. PATIENT-LVL V: ICD-10-PCS | Mod: PBBFAC,,, | Performed by: INTERNAL MEDICINE

## 2023-09-11 PROCEDURE — 1159F MED LIST DOCD IN RCRD: CPT | Mod: CPTII,S$GLB,, | Performed by: INTERNAL MEDICINE

## 2023-09-11 PROCEDURE — 3079F DIAST BP 80-89 MM HG: CPT | Mod: CPTII,S$GLB,, | Performed by: INTERNAL MEDICINE

## 2023-09-11 PROCEDURE — 3008F PR BODY MASS INDEX (BMI) DOCUMENTED: ICD-10-PCS | Mod: CPTII,S$GLB,, | Performed by: INTERNAL MEDICINE

## 2023-09-11 PROCEDURE — 3079F PR MOST RECENT DIASTOLIC BLOOD PRESSURE 80-89 MM HG: ICD-10-PCS | Mod: CPTII,S$GLB,, | Performed by: INTERNAL MEDICINE

## 2023-09-11 PROCEDURE — 99214 OFFICE O/P EST MOD 30 MIN: CPT | Mod: S$GLB,,, | Performed by: INTERNAL MEDICINE

## 2023-09-11 PROCEDURE — 1160F PR REVIEW ALL MEDS BY PRESCRIBER/CLIN PHARMACIST DOCUMENTED: ICD-10-PCS | Mod: CPTII,S$GLB,, | Performed by: INTERNAL MEDICINE

## 2023-09-11 PROCEDURE — 1159F PR MEDICATION LIST DOCUMENTED IN MEDICAL RECORD: ICD-10-PCS | Mod: CPTII,S$GLB,, | Performed by: INTERNAL MEDICINE

## 2023-09-11 PROCEDURE — 4010F PR ACE/ARB THEARPY RXD/TAKEN: ICD-10-PCS | Mod: CPTII,S$GLB,, | Performed by: INTERNAL MEDICINE

## 2023-09-11 RX ORDER — LOSARTAN POTASSIUM 100 MG/1
TABLET ORAL
Qty: 30 TABLET | Refills: 5 | Status: SHIPPED | OUTPATIENT
Start: 2023-09-11 | End: 2023-10-17

## 2023-09-11 RX ORDER — HYDROCHLOROTHIAZIDE 12.5 MG/1
12.5 TABLET ORAL DAILY
Qty: 30 TABLET | Refills: 5 | Status: SHIPPED | OUTPATIENT
Start: 2023-09-11 | End: 2024-03-18

## 2023-09-11 RX ORDER — ATORVASTATIN CALCIUM 20 MG/1
20 TABLET, FILM COATED ORAL DAILY
Qty: 30 TABLET | Refills: 5 | Status: SHIPPED | OUTPATIENT
Start: 2023-09-11 | End: 2024-03-18 | Stop reason: SDUPTHER

## 2023-09-11 NOTE — LETTER
AUTHORIZATION FOR RELEASE OF   CONFIDENTIAL INFORMATION    Dear Dr. Grimes,    We are seeing Bree Gonzales, date of birth 1960, in the clinic at [facility]. Shana Jade MD is the patient's PCP. Bree Gonzales has an outstanding lab/procedure at the time we reviewed her chart. In order to help keep her health information updated, she has authorized us to request the following medical record(s):        (  )  MAMMOGRAM                                      (  )  COLONOSCOPY      (  )  PAP SMEAR                                          (  )  OUTSIDE LAB RESULTS     ( X )  DEXA SCAN                                          (  )  EYE EXAM            (  )  FOOT EXAM                                          (  )  ENTIRE RECORD     (  )  OUTSIDE IMMUNIZATIONS                 (  )  _______________         Please fax records to [facility], Giambrone, Nicole A., MD, [phone]     If you have any questions, please contact Amber at [phone].           Patient Name: Bree Gonzales  : 1960  Patient Phone #: [phone]

## 2023-09-11 NOTE — PROGRESS NOTES
[facility] Note    Chief Complaint      Chief Complaint   Patient presents with    Follow-up       History of Present Illness      Bree Gonzales is a 63 y.o. F with HTN, Hypothyroidism, Depression, Anxiety, FM, OA, and Osteoporosis, Anemia of CKD III, HLD, Vit D def, FM, Peripheral neuropathy, Morbid Obesity, and fam h/o Breast Ca presents to fu chronic issues. Last visit - 3/1/23.    5th Metatarsal Fx - MRI Foot - 7/19/23 - oblique fracture of her 5th metatarsal that does not go into the joint space. Fu by Ortho.      H/o Melanoma - Fu by Derm. Doing well. Had resection of lesion to Left cheek 6/17/21. Has fu in 3 days to look at lesions on back.      HTN - Pt controlled on HCTZ 12.5 mg/d, Toprol 50 mg/d, and Losartan 100 mg/d     Hypothyroidism - Pt on Brand Name Synthroid 200 mcg/d x 6 days/wk. Pt is s/p Total Thyroidectomy due to thyroid Ca. TSH was low at  0.04 and Free T4 was normal.   Fu by Endo. Has upcoming appt with Dr. Blair.      Depression - Pt on Venlafaxine  mg +37.5 mg/d. She recently moved and cares for her elderly Mom. Stable.       Anxiety -  Pt on Venlafaxine  mg +37.5 mg/d. Her mom has dementia. Stable       Anemia of CKD III - Fu by Dr. Sarmiento.        CKD III -  Fu by Dr. Sarmiento.  Pt could not afford Farxiga 10 mg/d because insurance would not cover it. Stable.       HLD - Pt started atorvastatin 20 mg QHS in Feb. She c/o flatulence since starting this. She does not feel we need to stop the medication or try an alternate medication.  She will alert me if this changes.  In meantime she can try some Gas-X     Vit D def - Pt is on a MVI and Ca+D.      OA - Fu by Rheum, Dr. Grimes. MRI Knee pain - 6/29/23. Fu by Ortho. S/p Euflexxa 8/10/23 and 8/24/23.      Osteoporosis - Pt on Prolia. Last inj was in Dec. Knuckles hurt. Can try Voltaren gel.      FM - Pt on Lyrica 25 mg QAm and 2 tabs QHS.       Peripheral neuropathy - Foot pain - She c/o numbness and throbbing  at night. Wearing socks help.  Cymbalta 20 mg/d no help. Pt is on Lyrica 50mg QAm and 2 tabs QHS.     Morbid Obesity - BMI -44.35b Up 9 lb. She has not been exercising as much due to Foot ad knee pain.  she tries to cut back on carbs/starches.   Pt reports a recent normal HA1c. Pt tries to limit carbs.  Rec goal 150 min/wk. She had recent TFT's with Dr. Vitale.      fam h/o Breast Ca - Pt is BRCA neg. Due for MGM in May      H/o Malignant Hyperthermia -s/p anesthesia     Lab review:   6/8/22 - BUN/Cr - 32/1.21 GFR 49  3/17/22 - BUN/Cr - 38/1.19 - GFR - 50;  H/H - 11.2/36; iPTH - 81; Fe - 68; TIBC 490 -elev; %Sat - 14%; Vit D - 52  12/6/21 - BUN/Cr - 33/1.10; H/H - 12/37.4.     HCM - Flu - 9/13/22;  Tdap - 6/1/23;  PCV 13 - none;  PVX 23 - 1/25/17?;  Shingrix - 8/26/20 and #2 - 11/11/21;  Zostavax - 2/24/15; COVID - 19 Vaccine (Pfiizer)  #1 2/26/21; #2 3/19/21; #3 - 10/26/21; # 4 8/1/22; # 5 6/1/23; MGM - 5/9/23 - repeat 1 yr;  DEXA - 11/2020 - +Osteoporosis- had recent DA [facility] Dr. Grimes - will obtain copy for review.;  PAP - ?; Hep C Screen - 12/6/21 - neg. ;  HIV Screen - 12/6/21 - neg. ; C-scope - 6/7/22- Polyps, hemorrhoids - repeat 3 yrs;   Prev PCP - Dr. Duckworth;  Rheum - Dr. Grimes; Renal - Dr. Sarmiento; Derm - [facility]; Hand Sx - Dr. Quiroz; Ob/GYN - NP - Quiana Sotomayor; Endo - Dr. Vitale; Ophtho - Dr. Kennedy; GI - Dr. Bansal; Well visit - 3/1/23      Patient Care Team:  Shana Jade MD as PCP - General (Internal Medicine)  Amber Sotomayor MA as Care Coordinator  Praneeth Bansal MD as Consulting Physician (Gastroenterology)     Health Maintenance:  Immunization History   Administered Date(s) Administered    COVID-19, MRNA, LN-S, PF (Pfizer) (Gray Cap) 07/31/2022    COVID-19, MRNA, LN-S, PF (Pfizer) (Purple Cap) 02/26/2021, 03/19/2021, 10/26/2021, 08/01/2022    COVID-19, mRNA, LNP-S, bivalent booster, PF (Moderna Omicron) 06/01/2023    Influenza - Quadrivalent 10/03/2019    Influenza -  Quadrivalent - MDCK - PF 08/26/2020, 09/13/2022    Influenza - Quadrivalent - PF *Preferred* (6 months and older) 10/02/2013, 10/07/2014, 09/30/2015, 10/20/2016, 10/05/2017, 10/03/2018, 10/12/2021    Pneumococcal Polysaccharide - 23 Valent 01/25/2017    Tdap 04/30/2014, 05/02/2014, 06/01/2023    Zoster 02/24/2015, 08/26/2020, 11/11/2021    Zoster Recombinant 08/26/2020, 11/11/2021      Health Maintenance   Topic Date Due    Mammogram  05/09/2024    Colorectal Cancer Screening  06/07/2025    Lipid Panel  06/01/2028    TETANUS VACCINE  06/01/2033    Hepatitis C Screening  Completed    Shingles Vaccine  Completed        Past Medical History:  Past Medical History:   Diagnosis Date    BRCA1 negative     BRCA2 negative     CKD (chronic kidney disease), stage III 10/12/2021    Family history of breast cancer     Fibromyalgia     Hypertension     Hypothyroidism     Malignant hyperthermia due to anesthesia     Osteoporosis     Skin cancer 10/12/2021    Thyroid cancer 10/12/2021       Past Surgical History:   has a past surgical history that includes Cholecystectomy (05/1981); Total thyroidectomy (03/2002); Skin cancer excision; Dilation and curettage of uterus; Trigger finger release (Right, 11/2020); and Cataract extraction, bilateral.    Family History:  family history includes Breast cancer in her maternal grandmother; Breast cancer (age of onset: 51) in her sister; Breast cancer (age of onset: 74) in her mother; Dementia in her mother.     Social History:  Social History     Tobacco Use    Smoking status: Never    Smokeless tobacco: Never   Substance Use Topics    Alcohol use: Never    Drug use: Never       Review of Systems   Constitutional:  Positive for diaphoresis. Negative for chills and fever.   HENT:  Negative for hearing loss.    Eyes:  Negative for visual disturbance.   Respiratory:  Negative for chest tightness and shortness of breath.    Cardiovascular:  Positive for palpitations. Negative for chest pain.         She d/w Endo and he dec Synthroid to 6 times/d. No assoc CP or dizziness.    Gastrointestinal:  Positive for diarrhea. Negative for abdominal pain, constipation, nausea and vomiting.   Endocrine: Positive for heat intolerance. Negative for cold intolerance and polydipsia.   Genitourinary:  Negative for dysuria and frequency.   Musculoskeletal:  Positive for arthralgias. Negative for back pain and myalgias.        Left knee and Rt foot.    Neurological:  Positive for numbness. Negative for dizziness, weakness and headaches.        Tingling jose ramon feet   Psychiatric/Behavioral:  Positive for dysphoric mood. Negative for suicidal ideas. The patient is nervous/anxious.         Medications:    Current Outpatient Medications:     calcium-vitamin D3 (OS-MICH 500 + D3) 500 mg-5 mcg (200 unit) per tablet, Take 1 tablet by mouth., Disp: , Rfl:     cetirizine 10 mg Cap, Take by mouth., Disp: , Rfl:     denosumab (PROLIA) 60 mg/mL Syrg, Inject 1 mL (60 mg total) into the skin every 6 (six) months., Disp: , Rfl:     metoprolol succinate (TOPROL-XL) 50 MG 24 hr tablet, TAKE 1 TABLET BY MOUTH EVERY DAY, Disp: 90 tablet, Rfl: 2    pregabalin (LYRICA) 50 MG capsule, Take 50 mg by mouth 3 (three) times daily., Disp: , Rfl:     SYNTHROID 200 mcg tablet, Take one tablet 6 days a week, Disp: , Rfl:     venlafaxine (EFFEXOR-XR) 150 MG Cp24, TAKE 1 CAPSULE (150 MG TOTAL) BY MOUTH EVERY EVENING. WITH 37.5MG CAPSULE. TOTAL DAILY DOSE 187.5MG, Disp: 90 capsule, Rfl: 1    venlafaxine (EFFEXOR-XR) 37.5 MG 24 hr capsule, TAKE 1 CAPSULE BY MOUTH EVERY DAY WITH 150 MG FOR A TOTAL DAILY DOSE .5, Disp: 90 capsule, Rfl: 1    atorvastatin (LIPITOR) 20 MG tablet, Take 1 tablet (20 mg total) by mouth once daily., Disp: 30 tablet, Rfl: 5    hydroCHLOROthiazide (HYDRODIURIL) 12.5 MG Tab, Take 1 tablet (12.5 mg total) by mouth once daily., Disp: 30 tablet, Rfl: 5    losartan (COZAAR) 100 MG tablet, 1 po daily, Disp: 30 tablet, Rfl: 5    traMADoL  "(ULTRAM) 50 mg tablet, Take 1-2 tablets ( mg total) by mouth every 8 (eight) hours as needed for Pain. (Patient not taking: Reported on 9/11/2023), Disp: 21 tablet, Rfl: 0     Allergies:  Review of patient's allergies indicates:   Allergen Reactions    Nsaids (non-steroidal anti-inflammatory drug)     Neosporin (neomycin-polymyx) Rash       Physical Exam      Vital Signs  Temp: 97.8 °F (36.6 °C)  Temp Source: Oral  Pulse: 95  Resp: 18  SpO2: 96 %  BP: 130/84  BP Location: Left arm  Patient Position: Sitting  Pain Score:   4  Pain Loc: Knee  Height and Weight  Height: 5' 2" (157.5 cm)  Weight: 110 kg (242 lb 8.1 oz)  BSA (Calculated - sq m): 2.19 sq meters  BMI (Calculated): 44.3  Weight in (lb) to have BMI = 25: 136.4      Patient Position: Sitting      Physical Exam  Vitals reviewed.   Constitutional:       General: She is not in acute distress.     Appearance: Normal appearance. She is obese. She is not ill-appearing, toxic-appearing or diaphoretic.   HENT:      Head: Normocephalic and atraumatic.      Right Ear: Tympanic membrane normal.      Left Ear: Tympanic membrane normal.   Eyes:      Extraocular Movements: Extraocular movements intact.      Conjunctiva/sclera: Conjunctivae normal.      Pupils: Pupils are equal, round, and reactive to light.   Neck:      Vascular: No carotid bruit.   Cardiovascular:      Rate and Rhythm: Normal rate and regular rhythm.      Pulses: Normal pulses.      Heart sounds: Normal heart sounds.   Pulmonary:      Effort: Pulmonary effort is normal. No respiratory distress.      Breath sounds: Normal breath sounds.   Abdominal:      General: Bowel sounds are normal. There is no distension.      Palpations: Abdomen is soft.      Tenderness: There is no abdominal tenderness. There is no guarding or rebound.   Musculoskeletal:      Cervical back: Neck supple. No tenderness.      Comments: Left knee in compression sleeve   Neurological:      General: No focal deficit present.      " Mental Status: She is alert and oriented to person, place, and time.   Psychiatric:         Mood and Affect: Mood normal.         Behavior: Behavior normal.          Laboratory:  CBC:  Recent Labs   Lab 11/14/22  1034   WBC 7.52   RBC 4.33   Hemoglobin 12.4   Hematocrit 38.7   Platelets 275   MCV 89   MCH 28.6   MCHC 32.0       CMP:  Recent Labs   Lab 08/25/22  0855 11/14/22  1034   Glucose 102 H 89   Calcium 9.7 10.5   Albumin 4.1 3.6   Total Protein 6.8  --    Sodium 141 139   Potassium 3.6 4.0   CO2 31 30 H   Chloride 100 99   BUN 37 H 36 H   Creatinine 1.20 H 1.1   ALT 17  --    AST 18  --    Total Bilirubin 0.5  --           LIPIDS:  Recent Labs   Lab 02/22/22  1009 08/25/22  0855 06/01/23  0854   TSH  --   --  0.04 L   HDL 57 60 64   Cholesterol 240 H 185 181   Triglycerides 210 H 178 H 199 H   LDL Cholesterol 148 H 98 87   HDL/Cholesterol Ratio 4.2 3.1 2.8   Non HDL Chol. (LDL+VLDL) 183 H 125 117       TSH:  Recent Labs   Lab 06/01/23  0854   TSH 0.04 L       A1C:  Recent Labs   Lab 06/01/23  0854   Hemoglobin A1C 4.9            Recent Labs   Lab 12/06/21  0922   Hepatitis C Ab NON-REACTIVE       Assessment/Plan     Bree Gonzales is a 63 y.o.female with:    Hypertension, unspecified type  -     hydroCHLOROthiazide (HYDRODIURIL) 12.5 MG Tab; Take 1 tablet (12.5 mg total) by mouth once daily.  Dispense: 30 tablet; Refill: 5  -     losartan (COZAAR) 100 MG tablet; 1 po daily  Dispense: 30 tablet; Refill: 5  -     Cancel: Comprehensive Metabolic Panel; Future; Expected date: 09/11/2023  -     Comprehensive Metabolic Panel; Future; Expected date: 09/11/2023  - Controlled.  Cont current.     Hypercholesterolemia  -     atorvastatin (LIPITOR) 20 MG tablet; Take 1 tablet (20 mg total) by mouth once daily.  Dispense: 30 tablet; Refill: 5  - Stable.  Cont current regimen.    Postoperative hypothyroidism  -     TSH; Future; Expected date: 09/11/2023  - Repeat TSH.     Stage 3a chronic kidney disease  - Stable.  Cont  current regimen.    Numbness and tingling  -     Folate; Future; Expected date: 09/11/2023  -     Vitamin B12; Future; Expected date: 09/11/2023  -     Vitamin B1; Future; Expected date: 09/11/2023  - Check B12, folate, B1, and repeat TSH.     Idiopathic peripheral neuropathy  - Stable.  Cont current regimen.    Anxiety  - Stable.  Cont current regimen.    Depressive disorder  - Stable.  Cont current regimen.    Morbid obesity with BMI of 40.0-44.9, adult  - Rec diet and exercise as tolerated  for wt loss.      Osteoporosis, unspecified osteoporosis type, unspecified pathological fracture presence  - Stable.  Cont current regimen.    Fibromyalgia   - Stable.  Cont current regimen.    Chronic conditions status updated as per HPI.  Other than changes above, cont current medications and maintain follow up with specialists.  Follow up in about 6 months (around 3/11/2024) for well visit or sooner if needed.      Shana Jade MD  [facility]

## 2023-09-11 NOTE — PROGRESS NOTES
Patient due for the following    Pneumococcal Vaccines (Age 0-64) (2 - PCV)    Influenza Vaccine (1)      E-faxed Dr. Grimes for dexa records.    Immunizations: reviewed and updated  Care Everywhere: triggered  Care Teams: up to date  Outreach: none needed

## 2023-09-12 LAB
ALBUMIN SERPL-MCNC: 4.1 G/DL (ref 3.6–5.1)
ALBUMIN/GLOB SERPL: 1.7 (CALC) (ref 1–2.5)
ALP SERPL-CCNC: 85 U/L (ref 37–153)
ALT SERPL-CCNC: 17 U/L (ref 6–29)
AST SERPL-CCNC: 17 U/L (ref 10–35)
BILIRUB SERPL-MCNC: 0.8 MG/DL (ref 0.2–1.2)
BUN SERPL-MCNC: 36 MG/DL (ref 7–25)
BUN/CREAT SERPL: 23 (CALC) (ref 6–22)
CALCIUM SERPL-MCNC: 11.2 MG/DL (ref 8.6–10.4)
CHLORIDE SERPL-SCNC: 96 MMOL/L (ref 98–110)
CO2 SERPL-SCNC: 31 MMOL/L (ref 20–32)
CREAT SERPL-MCNC: 1.56 MG/DL (ref 0.5–1.05)
EGFR: 37 ML/MIN/1.73M2
FOLATE SERPL-MCNC: >24 NG/ML
GLOBULIN SER CALC-MCNC: 2.4 G/DL (CALC) (ref 1.9–3.7)
GLUCOSE SERPL-MCNC: 107 MG/DL (ref 65–139)
POTASSIUM SERPL-SCNC: 3.8 MMOL/L (ref 3.5–5.3)
PROT SERPL-MCNC: 6.5 G/DL (ref 6.1–8.1)
SODIUM SERPL-SCNC: 137 MMOL/L (ref 135–146)
TSH SERPL-ACNC: 0.02 MIU/L (ref 0.4–4.5)
VIT B12 SERPL-MCNC: 717 PG/ML (ref 200–1100)

## 2023-09-13 DIAGNOSIS — I10 HYPERTENSION, UNSPECIFIED TYPE: Primary | ICD-10-CM

## 2023-09-13 DIAGNOSIS — N18.31 STAGE 3A CHRONIC KIDNEY DISEASE: ICD-10-CM

## 2023-09-13 DIAGNOSIS — E83.52 HYPERCALCEMIA: ICD-10-CM

## 2023-09-13 NOTE — PROGRESS NOTES
I sent pt a my chart message - (Please make sure pt got this message and send a copy to Dr. Blair)  I reviewed your labs. Your vitamin B12 ad folic acid levels were both normal. Your TSH remains low. We will send a copy to Dr. Blair. Your kidney function went down.  It appears you could increase your hydration. Your calcium is high at 11.2. If you are taking a calcium supplement please stop this. Also, I would like you to hold your hydrochlorothiazide and monitor your blood pressure.  If you do not have a home Blood pressure cuff then I would like you to fu for a nurse BP chck in 2 wks.  I would also like to set up a repeat lab to retest your Calcium and kidney function  in 2 wk.     Dr. ROGERS

## 2023-09-15 ENCOUNTER — PATIENT OUTREACH (OUTPATIENT)
Dept: ADMINISTRATIVE | Facility: HOSPITAL | Age: 63
End: 2023-09-15
Payer: COMMERCIAL

## 2023-09-15 ENCOUNTER — TELEPHONE (OUTPATIENT)
Dept: PRIMARY CARE CLINIC | Facility: CLINIC | Age: 63
End: 2023-09-15
Payer: COMMERCIAL

## 2023-09-15 DIAGNOSIS — I10 HYPERTENSION, UNSPECIFIED TYPE: Primary | ICD-10-CM

## 2023-09-15 LAB — VIT B1 BLD-SCNC: 154 NMOL/L (ref 78–185)

## 2023-09-15 NOTE — TELEPHONE ENCOUNTER
----- Message from Shana Jade MD sent at 9/13/2023  5:48 PM CDT -----  I sent pt a my chart message - (Please make sure pt got this message and send a copy to Dr. Blair)  I reviewed your labs. Your vitamin B12 ad folic acid levels were both normal. Your TSH remains low. We will send a copy to Dr. Blair. Your kidney function went down.  It appears you could increase your hydration. Your calcium is high at 11.2. If you are taking a calcium supplement please stop this. Also, I would like you to hold your hydrochlorothiazide and monitor your blood pressure.  If you do not have a home Blood pressure cuff then I would like you to fu for a nurse BP chck in 2 wks.  I would also like to set up a repeat lab to retest your Calcium and kidney function  in 2 wk.     Dr. ROGERS

## 2023-09-15 NOTE — TELEPHONE ENCOUNTER
Just a BMP because Ithink it's due to her medication. If it remains high we would check iPTH, Vit D.   Thx,  Dr. G

## 2023-09-15 NOTE — PROGRESS NOTES
Patient due for the following    Pneumococcal Vaccines (Age 0-64) (2 - PCV)    Influenza Vaccine (1)      Immunizations: reviewed and updated  Care Everywhere: triggered  Care Teams: up to date  Outreach: none needed    Received dexa records.  Scanned to media. HM updated

## 2023-09-21 ENCOUNTER — PATIENT MESSAGE (OUTPATIENT)
Dept: PRIMARY CARE CLINIC | Facility: CLINIC | Age: 63
End: 2023-09-21
Payer: COMMERCIAL

## 2023-09-28 ENCOUNTER — PATIENT MESSAGE (OUTPATIENT)
Dept: PRIMARY CARE CLINIC | Facility: CLINIC | Age: 63
End: 2023-09-28
Payer: COMMERCIAL

## 2023-09-28 NOTE — TELEPHONE ENCOUNTER
Thanks for the log.  Some of these are elevated.  I would like to increase your Toprol to 1.5 tab (75 mg) daily. Send me another log in 2 wks. Include your Heart rate and Blood pressure.  Let me know if you have any issues with this increased dose   Dr. ROGERS

## 2023-09-28 NOTE — TELEPHONE ENCOUNTER
Lov 9/11/23  Pt was adv to hold her hctz. She is currently taking Losartan 100mg/d,Metoprolol Succinate 50mg/d

## 2023-09-29 LAB
BUN SERPL-MCNC: 23 MG/DL (ref 7–25)
BUN/CREAT SERPL: 19 (CALC) (ref 6–22)
CALCIUM SERPL-MCNC: 9.4 MG/DL (ref 8.6–10.4)
CHLORIDE SERPL-SCNC: 103 MMOL/L (ref 98–110)
CO2 SERPL-SCNC: 28 MMOL/L (ref 20–32)
CREAT SERPL-MCNC: 1.2 MG/DL (ref 0.5–1.05)
EGFR: 51 ML/MIN/1.73M2
GLUCOSE SERPL-MCNC: 166 MG/DL (ref 65–139)
POTASSIUM SERPL-SCNC: 4.5 MMOL/L (ref 3.5–5.3)
SODIUM SERPL-SCNC: 143 MMOL/L (ref 135–146)

## 2023-09-29 NOTE — PROGRESS NOTES
I sent pt a my chart message -  I reviewed your  labs.  Your kidney function looked better and is back to your baseline.  No further recommendations at this time.    Dr. ROGERS

## 2023-10-16 ENCOUNTER — PATIENT MESSAGE (OUTPATIENT)
Dept: PRIMARY CARE CLINIC | Facility: CLINIC | Age: 63
End: 2023-10-16
Payer: COMMERCIAL

## 2023-10-16 DIAGNOSIS — I10 HYPERTENSION, UNSPECIFIED TYPE: Primary | ICD-10-CM

## 2023-10-16 NOTE — TELEPHONE ENCOUNTER
This can wait for  to return  Lov 9/11/23  Pt is on Losartan 100mg/d,Metoprolol succ 75mg/d. She is still holding her HCTZ

## 2023-10-17 RX ORDER — OLMESARTAN MEDOXOMIL 40 MG/1
40 TABLET ORAL DAILY
Qty: 90 TABLET | Refills: 0 | Status: SHIPPED | OUTPATIENT
Start: 2023-10-17 | End: 2024-01-05

## 2023-10-17 NOTE — TELEPHONE ENCOUNTER
Her BP remains too high. Let's change the Losartan to Olmesartan 40 mg/d.  It is a stronger BP medication. Keep her other meds the same. Also push up her fu and have her come in for a nurse BP check and BMP in 2 wks. She can bring her BP log with her starting on the date she starts her new medication.   Dr. ROGERS

## 2023-10-24 ENCOUNTER — OFFICE VISIT (OUTPATIENT)
Dept: SPORTS MEDICINE | Facility: CLINIC | Age: 63
End: 2023-10-24
Payer: COMMERCIAL

## 2023-10-24 VITALS
BODY MASS INDEX: 44.22 KG/M2 | HEART RATE: 81 BPM | WEIGHT: 240.31 LBS | SYSTOLIC BLOOD PRESSURE: 123 MMHG | HEIGHT: 62 IN | DIASTOLIC BLOOD PRESSURE: 86 MMHG

## 2023-10-24 DIAGNOSIS — S83.232A COMPLEX TEAR OF MEDIAL MENISCUS OF LEFT KNEE AS CURRENT INJURY, INITIAL ENCOUNTER: ICD-10-CM

## 2023-10-24 DIAGNOSIS — M25.562 CHRONIC PAIN OF LEFT KNEE: ICD-10-CM

## 2023-10-24 DIAGNOSIS — M17.12 PRIMARY OSTEOARTHRITIS OF LEFT KNEE: Primary | ICD-10-CM

## 2023-10-24 DIAGNOSIS — G89.29 CHRONIC PAIN OF LEFT KNEE: ICD-10-CM

## 2023-10-24 DIAGNOSIS — M94.262 CHONDROMALACIA OF KNEE, LEFT: ICD-10-CM

## 2023-10-24 PROCEDURE — 3079F DIAST BP 80-89 MM HG: CPT | Mod: CPTII,S$GLB,, | Performed by: PHYSICIAN ASSISTANT

## 2023-10-24 PROCEDURE — 4010F ACE/ARB THERAPY RXD/TAKEN: CPT | Mod: CPTII,S$GLB,, | Performed by: PHYSICIAN ASSISTANT

## 2023-10-24 PROCEDURE — 3008F BODY MASS INDEX DOCD: CPT | Mod: CPTII,S$GLB,, | Performed by: PHYSICIAN ASSISTANT

## 2023-10-24 PROCEDURE — 3044F PR MOST RECENT HEMOGLOBIN A1C LEVEL <7.0%: ICD-10-PCS | Mod: CPTII,S$GLB,, | Performed by: PHYSICIAN ASSISTANT

## 2023-10-24 PROCEDURE — 99999 PR PBB SHADOW E&M-EST. PATIENT-LVL III: CPT | Mod: PBBFAC,,, | Performed by: PHYSICIAN ASSISTANT

## 2023-10-24 PROCEDURE — 1159F MED LIST DOCD IN RCRD: CPT | Mod: CPTII,S$GLB,, | Performed by: PHYSICIAN ASSISTANT

## 2023-10-24 PROCEDURE — 99999 PR PBB SHADOW E&M-EST. PATIENT-LVL III: ICD-10-PCS | Mod: PBBFAC,,, | Performed by: PHYSICIAN ASSISTANT

## 2023-10-24 PROCEDURE — 3074F PR MOST RECENT SYSTOLIC BLOOD PRESSURE < 130 MM HG: ICD-10-PCS | Mod: CPTII,S$GLB,, | Performed by: PHYSICIAN ASSISTANT

## 2023-10-24 PROCEDURE — 3044F HG A1C LEVEL LT 7.0%: CPT | Mod: CPTII,S$GLB,, | Performed by: PHYSICIAN ASSISTANT

## 2023-10-24 PROCEDURE — 1159F PR MEDICATION LIST DOCUMENTED IN MEDICAL RECORD: ICD-10-PCS | Mod: CPTII,S$GLB,, | Performed by: PHYSICIAN ASSISTANT

## 2023-10-24 PROCEDURE — 3074F SYST BP LT 130 MM HG: CPT | Mod: CPTII,S$GLB,, | Performed by: PHYSICIAN ASSISTANT

## 2023-10-24 PROCEDURE — 99214 PR OFFICE/OUTPT VISIT, EST, LEVL IV, 30-39 MIN: ICD-10-PCS | Mod: S$GLB,,, | Performed by: PHYSICIAN ASSISTANT

## 2023-10-24 PROCEDURE — 3079F PR MOST RECENT DIASTOLIC BLOOD PRESSURE 80-89 MM HG: ICD-10-PCS | Mod: CPTII,S$GLB,, | Performed by: PHYSICIAN ASSISTANT

## 2023-10-24 PROCEDURE — 4010F PR ACE/ARB THEARPY RXD/TAKEN: ICD-10-PCS | Mod: CPTII,S$GLB,, | Performed by: PHYSICIAN ASSISTANT

## 2023-10-24 PROCEDURE — 99214 OFFICE O/P EST MOD 30 MIN: CPT | Mod: S$GLB,,, | Performed by: PHYSICIAN ASSISTANT

## 2023-10-24 PROCEDURE — 3008F PR BODY MASS INDEX (BMI) DOCUMENTED: ICD-10-PCS | Mod: CPTII,S$GLB,, | Performed by: PHYSICIAN ASSISTANT

## 2023-10-24 NOTE — PROGRESS NOTES
CC: Left knee pain    Patient presents today for follow-up evaluation of left knee pain.  She completed viscosupplementation injections for the left knee approximately 2 months ago, and states that these have helped significantly with pain.  She is having less difficulty with ambulation, using stairs, and does not notice as much pain at night.  She continues to use topical analgesics and over-the-counter Tylenol as needed for pain.  She would like to continue to avoid surgery at this time given her improvement in symptoms.    Interval Hx (Chao Castillo PA-C):   Patient is a 63-year-old female who presents today for follow-up evaluation of left knee pain.  She recently underwent an MRI of the left knee to evaluate for a possible medial meniscus tear.  Thus far treatment has included Tylenol, topical Voltaren, and tramadol for breakthrough pain.  She also received a corticosteroid injection which unfortunately did not provide much relief.  She has been been wearing a knee brace.  No new injuries since her last visit.  Pain is typically worse with prolonged walking/bearing weight and going up and down stairs.  Occasionally, she notices some mechanical catching when bending and straightening the knee.  Here today to discuss MRI results and treatment options moving forward.      Initial Hx (5/18/2023):  Patient is a 62-year-old female who presents today for initial evaluation of left knee pain.  She has been seen by me before for her left shoulder.  Patient reports that she began experiencing acute left knee pain a little over a week ago.  She is unaware of any recent falls, injuries, or trauma to the left knee that may have led to this.  She localizes the pain to the medial aspect of the left knee without radiation.  She describes it as a sharp, stabbing pain that is worse with bearing weight, bending the knee, going up and down stairs, and squatting.  She feels that the left knee has been somewhat swollen since this  began.  She denies any subjective instability or mechanical grinding/catching in the left knee.  She sees a rheumatologist for osteoporosis and is on Prolia, but she denies any other rheumatologic issues.  Due to her history of CKD, she is unable to take oral anti-inflammatories, and has been icing the knee and taking Tylenol for pain with limited relief.  No prior injuries or surgeries on her left knee.    + mechanical symptoms, - instability    Is affecting ADLs.  Pain is 5/10 at it's worst.    REVIEW OF SYSTEMS:  Constitution: Negative. Negative for chills, fever and night sweats.   HENT: Negative for congestion and headaches.    Eyes: Negative for blurred vision, left vision loss and right vision loss.   Cardiovascular: Negative for chest pain and syncope.   Respiratory: Negative for cough and shortness of breath.    Endocrine: Negative for polydipsia, polyphagia and polyuria.   Hematologic/Lymphatic: Negative for bleeding problem. Does not bruise/bleed easily.   Skin: Negative for dry skin, itching and rash.   Musculoskeletal: Negative for falls. Positive for left knee pain and  muscle weakness.   Gastrointestinal: Negative for abdominal pain and bowel incontinence.   Genitourinary: Negative for bladder incontinence and nocturia.   Neurological: Negative for disturbances in coordination, loss of balance and seizures.   Psychiatric/Behavioral: Negative for depression. The patient does not have insomnia.    Allergic/Immunologic: Negative for hives and persistent infections.     PAST MEDICAL HISTORY:    Past Medical History:   Diagnosis Date    BRCA1 negative     BRCA2 negative     CKD (chronic kidney disease), stage III 10/12/2021    Family history of breast cancer     Fibromyalgia     Hypertension     Hypothyroidism     Malignant hyperthermia due to anesthesia     Osteoporosis     Skin cancer 10/12/2021    Thyroid cancer 10/12/2021       PAST SURGICAL HISTORY:   Past Surgical History:   Procedure Laterality Date     CATARACT EXTRACTION, BILATERAL      CHOLECYSTECTOMY  05/1981    DILATION AND CURETTAGE OF UTERUS      SKIN CANCER EXCISION      TOTAL THYROIDECTOMY  03/2002    TRIGGER FINGER RELEASE Right 11/2020       FAMILY HISTORY:   Family History   Problem Relation Age of Onset    Breast cancer Mother 74    Dementia Mother     Breast cancer Sister 51    Breast cancer Maternal Grandmother     Colon cancer Neg Hx     Ovarian cancer Neg Hx        SOCIAL HISTORY:   Social History     Socioeconomic History    Marital status:    Tobacco Use    Smoking status: Never    Smokeless tobacco: Never   Substance and Sexual Activity    Alcohol use: Never    Drug use: Never    Sexual activity: Yes     Partners: Male     Comment:        MEDICATIONS:     Current Outpatient Medications:     atorvastatin (LIPITOR) 20 MG tablet, Take 1 tablet (20 mg total) by mouth once daily., Disp: 30 tablet, Rfl: 5    cetirizine 10 mg Cap, Take by mouth., Disp: , Rfl:     metoprolol succinate (TOPROL-XL) 50 MG 24 hr tablet, TAKE 1 TABLET BY MOUTH EVERY DAY, Disp: 90 tablet, Rfl: 2    olmesartan (BENICAR) 40 MG tablet, Take 1 tablet (40 mg total) by mouth once daily., Disp: 90 tablet, Rfl: 0    pregabalin (LYRICA) 50 MG capsule, Take 50 mg by mouth 3 (three) times daily., Disp: , Rfl:     SYNTHROID 200 mcg tablet, Take one tablet 6 days a week, Disp: , Rfl:     venlafaxine (EFFEXOR-XR) 150 MG Cp24, TAKE 1 CAPSULE (150 MG TOTAL) BY MOUTH EVERY EVENING. WITH 37.5MG CAPSULE. TOTAL DAILY DOSE 187.5MG, Disp: 90 capsule, Rfl: 1    venlafaxine (EFFEXOR-XR) 37.5 MG 24 hr capsule, TAKE 1 CAPSULE BY MOUTH EVERY DAY WITH 150 MG FOR A TOTAL DAILY DOSE .5, Disp: 90 capsule, Rfl: 1    calcium-vitamin D3 (OS-MICH 500 + D3) 500 mg-5 mcg (200 unit) per tablet, Take 1 tablet by mouth., Disp: , Rfl:     denosumab (PROLIA) 60 mg/mL Syrg, Inject 1 mL (60 mg total) into the skin every 6 (six) months., Disp: , Rfl:     hydroCHLOROthiazide (HYDRODIURIL) 12.5 MG  "Tab, Take 1 tablet (12.5 mg total) by mouth once daily., Disp: 30 tablet, Rfl: 5    traMADoL (ULTRAM) 50 mg tablet, Take 1-2 tablets ( mg total) by mouth every 8 (eight) hours as needed for Pain. (Patient not taking: Reported on 9/11/2023), Disp: 21 tablet, Rfl: 0    ALLERGIES:   Review of patient's allergies indicates:   Allergen Reactions    Nsaids (non-steroidal anti-inflammatory drug)     Neosporin (neomycin-polymyx) Rash       VITAL SIGNS:   /86   Pulse 81   Ht 5' 2" (1.575 m)   Wt 109 kg (240 lb 4.8 oz)   BMI 43.95 kg/m²      PHYSICAL EXAMINATION  General:  The patient is alert and oriented x 3.  Mood is pleasant.  Observation of ears, eyes and nose reveal no gross abnormalities.  No labored breathing observed.    LEFT KNEE EXAMINATION     OBSERVATION / INSPECTION   Gait:   antalgic   Alignment:  Neutral   Scars:   None   Muscle atrophy: Mild  Effusion:  1+   Warmth:  None   Discoloration:   none     TENDERNESS / CREPITUS (T / C):          T / C      T / C   Patella   - / -   Lateral joint line   - / -   Peripatellar medial  -  Medial joint line    + / -   Peripatellar lateral -  Medial plica   - / -   Patellar tendon -   Popliteal fossa   - / -   Quad tendon   -   Gastrocnemius   -   Prepatellar Bursa - / -   Quadricep   -   Tibial tubercle  -  Thigh/hamstring  -   Pes anserine/HS +  Fibula    -   ITB   - / -  Tibia     -   Tib/fib joint  - / -  LCL    -     MFC   - / -   MCL: Proximal  -    LFC   - / -    Distal   +          ROM: (* = pain)  PASSIVE   ACTIVE      Left :   5 / 0 / 120*   5 / 0 / 120*     Right :    5 / 0 / 120   5 / 0 / 120    Patellofemoral examination:  See above noted areas of tenderness.   Patella position    Subluxation / dislocation: Centered           Sup. / Inf;   Normal   Crepitus (PF):    Absent   Patellar Mobility:       Medial-lateral:   Normal    Superior-inferior:  Normal    Inferior tilt   Normal    Patellar tendon:  Normal   Lateral tilt:    Normal "   J-sign:     None   Patellofemoral grind:   No pain       MENISCAL SIGNS:     Pain on terminal extension:  -  Pain on terminal flexion:  +  Mary Ellens maneuver:  + (for pain)  Squat     deferred    LIGAMENT EXAMINATION:  ACL / Lachman:  normal (-1 to 2mm)    PCL-Post.  drawer: normal 0 to 2mm  MCL- Valgus:  normal 0 to 2mm  LCL- Varus:  normal 0 to 2mm  Pivot shift: normal (Equal)   Dial Test: difference c/w other side   At 30° flexion: normal (< 5°)    At 90° flexion: normal (< 5°)   Reverse Pivot Shift:   normal (Equal)     STRENGTH: (* = with pain) PAINFUL SIDE   Quadricep   4/5   Hamstrin/5    EXTREMITY NEURO-VASCULAR EXAMINATION:   Sensation:  Grossly intact to light touch all dermatomal regions.   Motor Function:  Fully intact motor function at hip, knee, foot and ankle    DTRs;  quadriceps and  achilles 2+.  No clonus and downgoing Babinski.    Vascular status:  DP and PT pulses 2+, brisk capillary refill, symmetric.     OTHER FINDINGS:  N/A    X-rays bilateral knees (2023):      FINDINGS:  Bones are demineralized.  Mild narrowing at the femoral tibial joints.  Small left effusion.  May be small right effusion.  Narrowing of the patellofemoral joints.    Kellgren magy grade 2 bilaterally    MRI left knee (2023):  MRI images of the left knee ordered and independently interpreted by me show:  Complex tear of the medial meniscus with extrusion into the superior medial gutter.  Edema of the MCL which could be reactive next to adjacent medial meniscus tear versus a mild sprain.  Chondromalacia of the patellofemoral and medial tibiofemoral compartments.       ASSESSMENT:    Left knee pain  Complex tear of medial meniscus, left knee  Chondromalacia of left knee  Mild primary osteoarthritis of left knee    PLAN:     Prior imaging reviewed.    Recommend continue conservative treatment at this time including over-the-counter anti-inflammatories/acetaminophen, topical analgesics, RICE therapy,  and future corticosteroid assess viscosupplementation injections if needed.    Follow-up as needed.      All questions were answered, pt will contact us for questions or concerns in the interim.        Medical Dictation software was used during the dictation of portions or the entirety of this medical record.  Phonetic or grammatic errors may exist due to the use of this software. For clarification, refer to the author of the document.

## 2023-10-26 ENCOUNTER — PATIENT MESSAGE (OUTPATIENT)
Dept: PRIMARY CARE CLINIC | Facility: CLINIC | Age: 63
End: 2023-10-26
Payer: COMMERCIAL

## 2023-10-26 DIAGNOSIS — I10 HYPERTENSION, UNSPECIFIED TYPE: ICD-10-CM

## 2023-10-26 RX ORDER — METOPROLOL SUCCINATE 50 MG/1
TABLET, EXTENDED RELEASE ORAL
Qty: 135 TABLET | Refills: 0 | Status: SHIPPED | OUTPATIENT
Start: 2023-10-26 | End: 2023-11-02 | Stop reason: DRUGHIGH

## 2023-10-26 NOTE — TELEPHONE ENCOUNTER
LOV 9/11/23  Annual 3/1/23  RTC for nurse visit 10/31/23; With Dr. Jade 3/18/24    Patient is requesting a refill for her metoprolol succinate but is interested in either increasing the number of tablets received or increasing the dosage to 75 mg tablets. Patient states you increased her dosage from 50 mg to 75 mg and she has been having to cut the tablets.

## 2023-10-31 ENCOUNTER — CLINICAL SUPPORT (OUTPATIENT)
Dept: PRIMARY CARE CLINIC | Facility: CLINIC | Age: 63
End: 2023-10-31
Payer: COMMERCIAL

## 2023-10-31 ENCOUNTER — LAB VISIT (OUTPATIENT)
Dept: LAB | Facility: HOSPITAL | Age: 63
End: 2023-10-31
Attending: INTERNAL MEDICINE
Payer: COMMERCIAL

## 2023-10-31 VITALS — DIASTOLIC BLOOD PRESSURE: 98 MMHG | SYSTOLIC BLOOD PRESSURE: 140 MMHG | OXYGEN SATURATION: 98 % | HEART RATE: 78 BPM

## 2023-10-31 DIAGNOSIS — I10 HYPERTENSION, UNSPECIFIED TYPE: ICD-10-CM

## 2023-10-31 DIAGNOSIS — I10 HYPERTENSION, UNSPECIFIED TYPE: Primary | ICD-10-CM

## 2023-10-31 LAB
ANION GAP SERPL CALC-SCNC: 10 MMOL/L (ref 8–16)
BUN SERPL-MCNC: 29 MG/DL (ref 8–23)
CALCIUM SERPL-MCNC: 9.9 MG/DL (ref 8.7–10.5)
CHLORIDE SERPL-SCNC: 104 MMOL/L (ref 95–110)
CO2 SERPL-SCNC: 26 MMOL/L (ref 23–29)
CREAT SERPL-MCNC: 1.1 MG/DL (ref 0.5–1.4)
EST. GFR  (NO RACE VARIABLE): 56.8 ML/MIN/1.73 M^2
GLUCOSE SERPL-MCNC: 70 MG/DL (ref 70–110)
POTASSIUM SERPL-SCNC: 4.2 MMOL/L (ref 3.5–5.1)
SODIUM SERPL-SCNC: 140 MMOL/L (ref 136–145)

## 2023-10-31 PROCEDURE — 99999 PR PBB SHADOW E&M-EST. PATIENT-LVL II: ICD-10-PCS | Mod: PBBFAC,,,

## 2023-10-31 PROCEDURE — 99999 PR PBB SHADOW E&M-EST. PATIENT-LVL II: CPT | Mod: PBBFAC,,,

## 2023-10-31 PROCEDURE — 80048 BASIC METABOLIC PNL TOTAL CA: CPT | Performed by: INTERNAL MEDICINE

## 2023-10-31 PROCEDURE — 36415 COLL VENOUS BLD VENIPUNCTURE: CPT | Mod: PN | Performed by: INTERNAL MEDICINE

## 2023-10-31 NOTE — PROGRESS NOTES
Pt pressure was 138/97 pt has been taking her metoprolol succinate 75mg/day & olmesartan 40mg/d.Second reading 140/98    Pt has been taking her pressure over the pass 2 weeks     10/19 125/103 pulse 89       night time pressure 141/100 pluse 99  10/20 158/106 pulse 72  10/21 119/91 pulse 83             109/85  92  10/22 144/101  pulse 79           [id number]         Wrist blood pressure Only 3 times  1023  148/99 88   night time 156/97  97  10/24 143/96 86                      138/99   97    10/25  139/100  10/26  150/105    108/92  10/27 137/95        10/28 134/94     102/71  10/29 141/89      96/64  10/30  134/90      125/91

## 2023-10-31 NOTE — PROGRESS NOTES
Her BP remains elevated on Toprol 75 mg and Olmesartan 40 mg/d. We held HCTZ due to hypercalcemia.  Let's inc Toprol to 100 mg/d 9 she can take 2 of her 50 mg tabs once daily since she just picked these up..  Please update her med list.   Dr. ROGERS

## 2023-11-02 ENCOUNTER — PATIENT MESSAGE (OUTPATIENT)
Dept: PRIMARY CARE CLINIC | Facility: CLINIC | Age: 63
End: 2023-11-02
Payer: COMMERCIAL

## 2023-11-02 ENCOUNTER — TELEPHONE (OUTPATIENT)
Dept: PRIMARY CARE CLINIC | Facility: CLINIC | Age: 63
End: 2023-11-02
Payer: COMMERCIAL

## 2023-11-02 DIAGNOSIS — I10 HYPERTENSION, UNSPECIFIED TYPE: ICD-10-CM

## 2023-11-02 RX ORDER — METOPROLOL SUCCINATE 100 MG/1
100 TABLET, EXTENDED RELEASE ORAL DAILY
COMMUNITY
End: 2023-11-21 | Stop reason: SDUPTHER

## 2023-11-02 NOTE — TELEPHONE ENCOUNTER
We tried to reach patient via phone twice and left messages requesting she contact the office. Patient typcially responds [facility]. I also updated her med list.

## 2023-11-02 NOTE — TELEPHONE ENCOUNTER
----- Message from Dariana Michaels sent at 11/2/2023  9:51 AM CDT -----  Contact: [phone]  Patient is returning a phone call.  Who left a message for the patient: Lacy   Does patient know what this is regarding:  yes  Would you like a call back, or a response through your [facility] portal?:   callback   Comments:

## 2023-11-13 NOTE — PROGRESS NOTES
I sent pt a my chart message -   I apologize it took a while to get back to you as I was out of office several days. I reviewed your labs.  Your kidney function remains stable.  Continue to work on adequate hydration.  No further recommendations at this time.    Dr. ROGERS

## 2023-11-21 ENCOUNTER — PATIENT MESSAGE (OUTPATIENT)
Dept: PRIMARY CARE CLINIC | Facility: CLINIC | Age: 63
End: 2023-11-21
Payer: COMMERCIAL

## 2023-11-21 RX ORDER — METOPROLOL SUCCINATE 100 MG/1
100 TABLET, EXTENDED RELEASE ORAL DAILY
Qty: 90 TABLET | Refills: 0 | Status: SHIPPED | OUTPATIENT
Start: 2023-11-21 | End: 2024-02-16

## 2023-11-21 NOTE — TELEPHONE ENCOUNTER
No care due was identified.  Health [facility] Embedded Care Due Messages. Reference number: [id number].   11/21/2023 1:40:14 PM CST

## 2024-01-31 ENCOUNTER — PATIENT MESSAGE (OUTPATIENT)
Dept: PRIMARY CARE CLINIC | Facility: CLINIC | Age: 64
End: 2024-01-31
Payer: COMMERCIAL

## 2024-01-31 DIAGNOSIS — F32.A DEPRESSIVE DISORDER: ICD-10-CM

## 2024-01-31 DIAGNOSIS — F41.9 ANXIETY: ICD-10-CM

## 2024-01-31 RX ORDER — VENLAFAXINE HYDROCHLORIDE 37.5 MG/1
CAPSULE, EXTENDED RELEASE ORAL
Qty: 90 CAPSULE | Refills: 2 | Status: SHIPPED | OUTPATIENT
Start: 2024-01-31

## 2024-01-31 NOTE — TELEPHONE ENCOUNTER
Refill Routing Note   Medication(s) are not appropriate for processing by [facility] for the following reason(s):        Required vitals abnormal  Clarification of medication (Rx) details    [facility] action(s):  Defer        Medication Therapy Plan: Vitals:  (!) 140/98; Please see adherence data in the notes. Appears as though patient may not be taking fully intended TDD. Could not find progress notes stating any additional dose adjustments outside of intended TDD.      Appointments  past 12m or future 3m with PCP    Date Provider   Last Visit   9/11/2023 Shana Jade MD   Next Visit   3/18/2024 Shana Jade MD   ED visits in past 90 days: 0        Note composed:6:28 AM 01/31/2024

## 2024-01-31 NOTE — TELEPHONE ENCOUNTER
No care due was identified.  Health [facility] Embedded Care Due Messages. Reference number: [id number].   1/31/2024 12:03:08 AM CST

## 2024-01-31 NOTE — TELEPHONE ENCOUNTER
SIG denotes a TDD achieved by taking 37.5mg and 150mg. Aherence data as shown below indicates the patient may not have filled the 150mg dose since September.

## 2024-01-31 NOTE — TELEPHONE ENCOUNTER
Please check in with pt to see how she has been taking her venlafaxine.  It appears she may not be filling appropriately  Dr. ROGERS

## 2024-02-01 RX ORDER — VENLAFAXINE HYDROCHLORIDE 150 MG/1
150 CAPSULE, EXTENDED RELEASE ORAL NIGHTLY
Qty: 90 CAPSULE | Refills: 1 | Status: SHIPPED | OUTPATIENT
Start: 2024-02-01

## 2024-02-01 NOTE — TELEPHONE ENCOUNTER
No care due was identified.  [facility] Embedded Care Due Messages. Reference number: [id number].   2/01/2024 7:16:49 AM CST

## 2024-02-01 NOTE — TELEPHONE ENCOUNTER
Lov 9/11/23  Pt states she does take a 150mg&37.5mg tab daily. We already filled the 37.5mg but she will need the 150mg soon. Please sign pended rx.

## 2024-02-12 ENCOUNTER — PATIENT MESSAGE (OUTPATIENT)
Dept: SPORTS MEDICINE | Facility: CLINIC | Age: 64
End: 2024-02-12
Payer: COMMERCIAL

## 2024-02-22 ENCOUNTER — PATIENT MESSAGE (OUTPATIENT)
Dept: SPORTS MEDICINE | Facility: CLINIC | Age: 64
End: 2024-02-22
Payer: COMMERCIAL

## 2024-03-06 ENCOUNTER — HOSPITAL ENCOUNTER (OUTPATIENT)
Dept: RADIOLOGY | Facility: HOSPITAL | Age: 64
Discharge: HOME | End: 2024-03-06
Attending: PHYSICIAN ASSISTANT
Payer: COMMERCIAL

## 2024-03-06 ENCOUNTER — OFFICE VISIT (OUTPATIENT)
Dept: SPORTS MEDICINE | Facility: CLINIC | Age: 64
End: 2024-03-06
Payer: COMMERCIAL

## 2024-03-06 VITALS
HEIGHT: 62 IN | SYSTOLIC BLOOD PRESSURE: 135 MMHG | HEART RATE: 80 BPM | WEIGHT: 249.38 LBS | DIASTOLIC BLOOD PRESSURE: 89 MMHG | BODY MASS INDEX: 45.89 KG/M2

## 2024-03-06 DIAGNOSIS — M65.4 DE QUERVAIN'S TENOSYNOVITIS, LEFT: ICD-10-CM

## 2024-03-06 DIAGNOSIS — M18.12 ARTHRITIS OF CARPOMETACARPAL (CMC) JOINT OF LEFT THUMB: ICD-10-CM

## 2024-03-06 DIAGNOSIS — M25.532 LEFT WRIST PAIN: ICD-10-CM

## 2024-03-06 DIAGNOSIS — M25.532 LEFT WRIST PAIN: Primary | ICD-10-CM

## 2024-03-06 PROCEDURE — 1160F RVW MEDS BY RX/DR IN RCRD: CPT | Mod: CPTII,S$GLB,, | Performed by: PHYSICIAN ASSISTANT

## 2024-03-06 PROCEDURE — 3079F DIAST BP 80-89 MM HG: CPT | Mod: CPTII,S$GLB,, | Performed by: PHYSICIAN ASSISTANT

## 2024-03-06 PROCEDURE — 73110 X-RAY EXAM OF WRIST: CPT | Mod: TC,LT

## 2024-03-06 PROCEDURE — 99999 PR PBB SHADOW E&M-EST. PATIENT-LVL IV: CPT | Mod: PBBFAC,,, | Performed by: PHYSICIAN ASSISTANT

## 2024-03-06 PROCEDURE — 1159F MED LIST DOCD IN RCRD: CPT | Mod: CPTII,S$GLB,, | Performed by: PHYSICIAN ASSISTANT

## 2024-03-06 PROCEDURE — 73110 X-RAY EXAM OF WRIST: CPT | Mod: 26,LT,, | Performed by: INTERNAL MEDICINE

## 2024-03-06 PROCEDURE — 4010F ACE/ARB THERAPY RXD/TAKEN: CPT | Mod: CPTII,S$GLB,, | Performed by: PHYSICIAN ASSISTANT

## 2024-03-06 PROCEDURE — 3008F BODY MASS INDEX DOCD: CPT | Mod: CPTII,S$GLB,, | Performed by: PHYSICIAN ASSISTANT

## 2024-03-06 PROCEDURE — 3075F SYST BP GE 130 - 139MM HG: CPT | Mod: CPTII,S$GLB,, | Performed by: PHYSICIAN ASSISTANT

## 2024-03-06 PROCEDURE — 99214 OFFICE O/P EST MOD 30 MIN: CPT | Mod: S$GLB,,, | Performed by: PHYSICIAN ASSISTANT

## 2024-03-06 NOTE — PROGRESS NOTES
CC: left wrist pain    HPI:   Bree Gonzales is a pleasant 63 y.o. patient who reports to clinic with left wrist pain.  She sustained a fall onto the left wrist on a grass surface approximately 3 months ago while chasing after her dog.  She had some mild discomfort initially, but notes the pain significantly worsened about a month after the fall.  Since then, she has had increased pain along the radial aspect of the wrist and the base of the thumb.  This is typically worse with flexion, extension, and radial deviation of the wrist and pushing herself up from a chair.  She has noticed some mild swelling in the area as well.  She states that she also notes some weakness with movement of her thumb and with her  strength as well.  She has been icing the area and wearing a Velcro wrist splint with little relief.  No prior injuries or surgeries on the left wrist.    Today the patient rates pain at a 4/10 on visual analog scale.        They have not responded to conservative care.    PAST MEDICAL HISTORY:   Past Medical History:   Diagnosis Date    BRCA1 negative     BRCA2 negative     CKD (chronic kidney disease), stage III 10/12/2021    Family history of breast cancer     Fibromyalgia     Hypertension     Hypothyroidism     Malignant hyperthermia due to anesthesia     Osteoporosis     Skin cancer 10/12/2021    Thyroid cancer 10/12/2021       PAST SURGICAL HISTORY:   Past Surgical History:   Procedure Laterality Date    CATARACT EXTRACTION, BILATERAL      CHOLECYSTECTOMY  05/1981    DILATION AND CURETTAGE OF UTERUS      SKIN CANCER EXCISION      TOTAL THYROIDECTOMY  03/2002    TRIGGER FINGER RELEASE Right 11/2020       SOCIAL HISTORY:     Social History     Socioeconomic History    Marital status:    Tobacco Use    Smoking status: Never    Smokeless tobacco: Never   Substance and Sexual Activity    Alcohol use: Never    Drug use: Never    Sexual activity: Yes     Partners: Male     Comment:      Social  Determinants of Health     Financial Resource Strain: Medium Risk (3/4/2024)    Overall Financial Resource Strain (CARDIA)     Difficulty of Paying Living Expenses: Somewhat hard   Food Insecurity: No Food Insecurity (3/4/2024)    Hunger Vital Sign     Worried About Running Out of Food in the Last Year: Never true     Ran Out of Food in the Last Year: Never true   Transportation Needs: No Transportation Needs (3/4/2024)    PRAPARE - Transportation     Lack of Transportation (Medical): No     Lack of Transportation (Non-Medical): No   Physical Activity: Inactive (3/4/2024)    Exercise Vital Sign     Days of Exercise per Week: 0 days     Minutes of Exercise per Session: 0 min   Stress: Stress Concern Present (3/4/2024)    [employer] [place] [employer] - Occupational Stress Questionnaire     Feeling of Stress : To some extent   Social Connections: Unknown (3/4/2024)    Social Connection and Isolation Panel [NHANES]     Frequency of Communication with Friends and Family: Twice a week     Frequency of Social Gatherings with Friends and Family: Once a week     Active Member of Clubs or Organizations: No     Attends Club or Organization Meetings: Never     Marital Status:    Housing Stability: Low Risk  (3/4/2024)    Housing Stability Vital Sign     Unable to Pay for Housing in the Last Year: No     Number of Places Lived in the Last Year: 1     Unstable Housing in the Last Year: No       FAMILY HISTORY:   Family History   Problem Relation Age of Onset    Breast cancer Mother 74    Dementia Mother     Breast cancer Sister 51    Breast cancer Maternal Grandmother     Colon cancer Neg Hx     Ovarian cancer Neg Hx        MEDICATIONS:    Current Outpatient Medications:     atorvastatin (LIPITOR) 20 MG tablet, Take 1 tablet (20 mg total) by mouth once daily., Disp: 30 tablet, Rfl: 5    cetirizine 10 mg Cap, Take by mouth., Disp: , Rfl:     metoprolol succinate (TOPROL-XL) 100 MG 24 hr tablet, Take 1 tablet  "(100 mg total) by mouth once daily., Disp: 90 tablet, Rfl: 0    olmesartan (BENICAR) 40 MG tablet, TAKE 1 TABLET BY MOUTH EVERY DAY, Disp: 90 tablet, Rfl: 0    pregabalin (LYRICA) 50 MG capsule, Take 50 mg by mouth 3 (three) times daily., Disp: , Rfl:     SYNTHROID 200 mcg tablet, Take one tablet 6 days a week, Disp: , Rfl:     venlafaxine (EFFEXOR-XR) 150 MG Cp24, Take 1 capsule (150 mg total) by mouth every evening. With 37.5mg capsule. Total daily dose 187.5mg, Disp: 90 capsule, Rfl: 1    venlafaxine (EFFEXOR-XR) 37.5 MG 24 hr capsule, TAKE 1 CAPSULE BY MOUTH EVERY DAY WITH 150 MG FOR A TOTAL DAILY DOSE .5, Disp: 90 capsule, Rfl: 2    calcium-vitamin D3 (OS-MICH 500 + D3) 500 mg-5 mcg (200 unit) per tablet, Take 1 tablet by mouth., Disp: , Rfl:     denosumab (PROLIA) 60 mg/mL Syrg, Inject 1 mL (60 mg total) into the skin every 6 (six) months., Disp: , Rfl:     hydroCHLOROthiazide (HYDRODIURIL) 12.5 MG Tab, Take 1 tablet (12.5 mg total) by mouth once daily., Disp: 30 tablet, Rfl: 5    traMADoL (ULTRAM) 50 mg tablet, Take 1-2 tablets ( mg total) by mouth every 8 (eight) hours as needed for Pain. (Patient not taking: Reported on 9/11/2023), Disp: 21 tablet, Rfl: 0    ALLERGIES:   Review of patient's allergies indicates:   Allergen Reactions    Nsaids (non-steroidal anti-inflammatory drug)     Neosporin (neomycin-polymyx) Rash       VITAL SIGNS:  /89   Pulse 80   Ht 5' 2" (1.575 m)   Wt 113.1 kg (249 lb 5.8 oz)   BMI 45.61 kg/m²      REVIEW OF SYSTEMS:  Constitution: Negative. Negative for chills, fever and night sweats.   HENT: Negative for congestion and headaches.    Eyes: Negative for blurred vision, left vision loss and right vision loss.   Cardiovascular: Negative for chest pain and syncope.   Respiratory: Negative for cough and shortness of breath.    Endocrine: Negative for polydipsia, polyphagia and polyuria.   Hematologic/Lymphatic: Negative for bleeding problem. Does not bruise/bleed " "easily.   Skin: Negative for dry skin, itching and rash.   Musculoskeletal: Positive for falls. Positive for left wrist pain and muscle weakness.   Gastrointestinal: Negative for abdominal pain and bowel incontinence.   Genitourinary: Negative for bladder incontinence and nocturia.   Neurological: Negative for disturbances in coordination, loss of balance and seizures.   Psychiatric/Behavioral: Negative for depression. The patient does not have insomnia.    Allergic/Immunologic: Negative for hives and persistent infections.       PHYSICAL EXAM:  VITAL SIGNS: /89   Pulse 80   Ht 5' 2" (1.575 m)   Wt 113.1 kg (249 lb 5.8 oz)   BMI 45.61 kg/m²   GENERAL: Patient appears alert and oriented x 3, Well nourished, in no acute distress and ambulates with a non-antalgic gait with no assistive devices or braces.  SKIN: Skin intact bilaterally. Sensation intact bilaterally. Compartments soft. No evidence of edema, infection, or induration.       ELBOW / HAND EXTREMITY EXAM:    OBSERVATION / INSPECTION: painful side  Swelling  mild    Deformity  none  Discoloration  none     Scars   none    Atrophy  none    TENDERNESS / CREPITUS (T / C):  painful side         T / C        1st Dorsal compartment  +/-  FCR     -/-  FCU     -/-  Ulnar Styloid    -/-  Radial Styloid    +/-  Palm     -/-  Metacarpals    -/-  Thumb CMC    +/-   Thumb MCP    -/-  Lesser MCPs    -/-  PIP     -/-  DIP     -/-          Range of motion: ('*' = with pain)    Right Elbow        AROM (PROM)     Extension   0 deg  (5 deg)   Flexion   145 deg (145 deg)         Pronation  90 deg  (90 deg)      Supination   80 deg  (80 deg)                   Left Elbow      AROM (PROM)     Extension   0 deg  (5 deg)   Flexion   145 deg (145 deg)         Pronation  90 deg  (90 deg)      Supination   80 deg  (80 deg)      Right Wrist    Extension   80 deg (85 deg)   Flexion   80 deg (85 deg)         Ulnar Deviation   35 deg (40 deg)  Radial Deviation 35 deg (40 deg)       "       Left Wrist      AROM (PROM)     Extension   80 deg* (80 deg*)   Flexion   80 deg* (80 deg*)         Ulnar Deviation   35 deg* (40 deg*)  Radial Deviation 30 deg* (30 deg*)          WRIST EXAMINATION:  See above noted areas of tenderness.   Finkelstein's Test   +  TTP at 1st Dorsal Compartment +  Tinel's Test - Carpal Tunnel  neg  Phalen's Test    neg  Median Nerve Compression Test neg  Ulnar-sided Compression Test neg  LT Ballottment Test   neg  Snuff box tenderness   +  Portillo's Test    neg  LT Instability    neg  Hook of Hamate Tenderness  neg     STRENGTH: ('*' = with pain) Painful side   Elbow Flexion:    5/5  Elbow Extension:   5/5  Wrist Flexion:    4/5*  Wrist Extension:   4/5*  :     5/5  Intrinsics:    5/5  EPL (Extensor pollicis longus: 4/5*  Pinch Mechanism:   5/5    EXTREMITY NEURO-VASCULAR EXAMINATION: Sensation grossly intact to light touch all dermatomal regions. DTR 2+ Biceps, Triceps, BR and Negative Jhon's sign. Grossly intact motor function at Elbow, Wrist and Hand. Distal pulses radial and ulnar 2+, brisk cap refill, symmetric.      Other Findings:  none    X-rays left wrist (3/6/2024):  Xrays of the left wrist show no acute fracture or dislocation.  There is some mild generalized osteoarthritis of the wrist and CMC joint    ASSESSMENT:  Left wrist pain, possible occult scaphoid fracture  De Quervain's tenosynovitis  CMC arthritis of left thumb    PLAN:    I made the decision to obtain old records of the patient including previous notes and imaging. New imaging was ordered today of the extremity or extremities evaluated. I independently reviewed and interpreted the radiographs and/or MRIs today as well as prior imaging, if available.    We discussed at length different treatment options including conservative vs surgical management. These include anti-inflammatories, acetaminophen, rest, ice, heat, formal physical therapy including strengthening and stretching exercises, home  exercise programs, dry needling, and finally surgical intervention.      Orders placed for MRI without contrast of the left wrist to evaluate for possible occult scaphoid fracture.    Recommend she continue to wear her Velcro wrist splint for immobilization/protection.  Continue to ice the area and take over-the-counter acetaminophen as needed for pain.    I will call her with her MRI results and likely refer to our hand colleagues for further treatment.      All questions were answered. Instructed patient to call with questions or concerns in the interim.       Medical Dictation software was used during the dictation of portions or the entirety of this medical record.  Phonetic or grammatic errors may exist due to the use of this software. For clarification, refer to the author of the document.

## 2024-03-07 ENCOUNTER — PATIENT MESSAGE (OUTPATIENT)
Dept: SPORTS MEDICINE | Facility: CLINIC | Age: 64
End: 2024-03-07
Payer: COMMERCIAL

## 2024-03-07 ENCOUNTER — TELEPHONE (OUTPATIENT)
Dept: SPORTS MEDICINE | Facility: CLINIC | Age: 64
End: 2024-03-07
Payer: COMMERCIAL

## 2024-03-08 ENCOUNTER — PATIENT MESSAGE (OUTPATIENT)
Dept: SPORTS MEDICINE | Facility: CLINIC | Age: 64
End: 2024-03-08
Payer: COMMERCIAL

## 2024-03-17 NOTE — PROGRESS NOTES
[facility] Note    Chief Complaint      Chief Complaint   Patient presents with    Annual Exam       History of Present Illness      Bree Gonzales is a 63 y.o.  F with HTN, Hypothyroidism, Depression, Anxiety, FM, OA, and Osteoporosis, Anemia of CKD III, HLD, Vit D def, FM, Peripheral neuropathy, Morbid Obesity, and fam h/o Breast Ca presents to fu chronic issues. Last visit - 9/11/23.      5th Metatarsal Fx - MRI Foot - 7/19/23 - oblique fracture of her 5th metatarsal that does not go into the joint space. Fu by Ortho.        H/o Melanoma - Fu by Derm. Doing well. Had resection of lesion to Left cheek 6/17/21.       HTN - Pt controlled on Toprol 100 mg/d, and Olmesartan 40 mg/d     Hypothyroidism - Pt on Brand Name Synthroid 200 mcg/d x 6 days/wk. Pt is s/p Total Thyroidectomy due to thyroid Ca. TSH was low at  0.04 and Free T4 was normal.   Fu by Endo. TSH remains low. We sent a copy to Dr. Blair. She had a repeat [employer] copy for review. She is changing to Dr. Bartlett, 5/13/24.      Depression - Pt on Venlafaxine  mg +37.5 mg/d. She recently moved and cares for her elderly Mom. Stable.       Anxiety -  Pt on Venlafaxine  mg +37.5 mg/d. Her mom has dementia. Stable       Anemia of CKD III - Fu by Dr. Sarmiento.        CKD III -  Fu by Dr. Sarmiento.  Pt could not afford Farxiga 10 mg/d because insurance would not cover it. Stable.      H/o Hypercalcemia - Calcium is 10.1 down from 11.2.  PTH - 57  - wnl - 1/9/24. We held her calcium suppl. and we stopped her  hydrochlorothiazide due to hypercalcemia.  I had her inc Toprol to 100 mg daily.  Kidney function remains stable. Continue to work on adequate hydration.      HLD - Pt on atorvastatin 20 mg QHS. She c/o flatulence since starting this. She does not feel we need to stop the medication or try an alternate medication.  She will alert me if this changes.  Can take prn Gas-X     Vit D def - Pt is on a MVI and Ca+D.      OA - Fu  by Rheum, Dr. Grimes. MRI Knee pain - 6/29/23. Fu by Ortho. S/p Euflexxa 8/10/23 and 8/24/23.      Osteoporosis - Pt on Prolia. Last inj was in Dec. Knuckles hurt. Can try Voltaren gel.      FM - Pt on Lyrica 25 mg QAm and 2 tabs QHS.       Peripheral neuropathy - Foot pain - She c/o numbness and throbbing at night. Wearing socks help.  Cymbalta 20 mg/d no help. Pt is on Lyrica 50mg QAm and 2 tabs QHS.  Vitamin B12 and folic acid levels were both normal. Unclear etiol.  Can refer to Neurology in future for EMG.  Pt defers for now.  Stable.       Morbid Obesity - BMI -46.01b Up 9 lb since last visit. She has not been exercising as much due to Foot and knee pain.  she tries to cut back on carbs/starches.   Pt reports a recent normal HA1c. Pt tries to limit carbs.  Rec goal 150 min/wk.       fam h/o Breast Ca - Pt is BRCA neg. Due for MGM in May      H/o Malignant Hyperthermia -s/p anesthesia     Lab review:   6/8/22 - BUN/Cr - 32/1.21 GFR 49  3/17/22 - BUN/Cr - 38/1.19 - GFR - 50;  H/H - 11.2/36; iPTH - 81; Fe - 68; TIBC 490 -elev; %Sat - 14%; Vit D - 52  12/6/21 - BUN/Cr - 33/1.10; H/H - 12/37.4.     HCM - Flu - 9/21/23;  Tdap - 6/1/23;  PCV 13 - none;  PVX 23 - 1/25/17;  Shingrix - 8/26/20 and #2 - 11/11/21;  Zostavax - 2/24/15; COVID - 19 Vaccine (Pfiizer)  #1 2/26/21; #2 3/19/21; #3 - 10/26/21; # 4 8/1/22; # 5 6/1/23; MGM - 5/9/23 - repeat 1 yr;  DEXA - 11/2020 - +Osteoporosis- had recent DA ieth Dr. Grimes - will obtain copy for review.;  PAP - 5/26/23 - neg. ; Hep C Screen - 12/6/21 - neg. ;  HIV Screen - 12/6/21 - neg. ; C-scope - 6/7/22- Polyps, hemorrhoids - repeat 3 yrs;   Prev PCP - Dr. Duckworth;  Rheum - Dr. Grimes; Renal - Dr. Sarmiento; Derm - [facility]; Hand Sx - Dr. Quiroz; Ob/GYN - NP - Quiana Sotomayor; Endo - Dr. Vitale; Ophtho - Dr. Kennedy; GI - Dr. Bansal; Well visit - 3/18/24    Patient Care Team:  Shana Jade MD as PCP - General (Internal Medicine)  Amber Sotomayor MA as Care  Coordinator  Praneeth Bansal MD as Consulting Physician (Gastroenterology)  Gatito Grimes Jr., MD (Rheumatology)     Health Maintenance:  Immunization History   Administered Date(s) Administered    COVID-19 Vaccine 09/21/2023    COVID-19, MRNA, LN-S, PF (Pfizer) (Gray Cap) 07/31/2022    COVID-19, MRNA, LN-S, PF (Pfizer) (Purple Cap) 02/26/2021, 03/19/2021, 10/26/2021, 08/01/2022    COVID-19, mRNA, LNP-S, bivalent booster, PF (Moderna Omicron)12 + YEARS 06/01/2023    Influenza - Quadrivalent 10/03/2019    Influenza - Quadrivalent - MDCK - PF 08/26/2020, 09/13/2022, 09/21/2023    Influenza - Quadrivalent - PF *Preferred* (6 months and older) 10/02/2013, 10/07/2014, 09/30/2015, 10/20/2016, 10/05/2017, 10/03/2018, 10/12/2021    Pneumococcal Polysaccharide - 23 Valent 01/25/2017    Tdap 04/30/2014, 05/02/2014, 06/01/2023    Zoster 02/24/2015, 08/26/2020, 11/11/2021    Zoster Recombinant 08/26/2020, 11/11/2021      Health Maintenance   Topic Date Due    Mammogram  05/09/2024    Colorectal Cancer Screening  06/07/2025    Lipid Panel  06/01/2028    TETANUS VACCINE  06/01/2033    Hepatitis C Screening  Completed    Shingles Vaccine  Completed        Past Medical History:  Past Medical History:   Diagnosis Date    BRCA1 negative     BRCA2 negative     CKD (chronic kidney disease), stage III 10/12/2021    Family history of breast cancer     Fibromyalgia     Hypertension     Hypothyroidism     Malignant hyperthermia due to anesthesia     Osteoporosis     Skin cancer 10/12/2021    Thyroid cancer 10/12/2021       Past Surgical History:   has a past surgical history that includes Cholecystectomy (05/1981); Total thyroidectomy (03/2002); Skin cancer excision; Dilation and curettage of uterus; Trigger finger release (Right, 11/2020); and Cataract extraction, bilateral.    Family History:  family history includes Breast cancer in her maternal grandmother; Breast cancer (age of onset: 51) in her sister; Breast cancer (age of  onset: 74) in her mother; Dementia in her mother.     Social History:  Social History     Tobacco Use    Smoking status: Never    Smokeless tobacco: Never   Substance Use Topics    Alcohol use: Never    Drug use: Never       Review of Systems   Constitutional:  Negative for chills, diaphoresis and fever.   HENT:  Positive for postnasal drip. Negative for sore throat.         C/o congestion at night and feeling of something in her throat ? Postnasal drip. Reports it felt like prior to her thyroidectomy. She had a recent Thyroid U/S - will obtain copy for review.   Rec trial of Flonase   Eyes:  Negative for visual disturbance.   Respiratory:  Negative for chest tightness and shortness of breath.         Pain from Left low back that radiated to her chest at night last wk x 1 episode.  Lasted 6 hrs. BP was high. No assoc SOB, Palpitations.  It did wake her up.    Cardiovascular:  Negative for chest pain.   Gastrointestinal:  Negative for abdominal pain, constipation, diarrhea, nausea, vomiting and reflux.   Genitourinary:  Negative for bladder incontinence, difficulty urinating, dysuria, frequency and hematuria.   Musculoskeletal:  Positive for arthralgias. Negative for myalgias.   Neurological:  Positive for numbness. Negative for memory loss.        Medications:    Current Outpatient Medications:     cetirizine 10 mg Cap, Take by mouth., Disp: , Rfl:     denosumab (PROLIA) 60 mg/mL Syrg, Inject 1 mL (60 mg total) into the skin every 6 (six) months., Disp: , Rfl:     metoprolol succinate (TOPROL-XL) 100 MG 24 hr tablet, Take 1 tablet (100 mg total) by mouth once daily., Disp: 90 tablet, Rfl: 0    olmesartan (BENICAR) 40 MG tablet, TAKE 1 TABLET BY MOUTH EVERY DAY, Disp: 90 tablet, Rfl: 0    pregabalin (LYRICA) 50 MG capsule, Take 50 mg by mouth 3 (three) times daily., Disp: , Rfl:     SYNTHROID 200 mcg tablet, Take one tablet 6 days a week, Disp: , Rfl:     venlafaxine (EFFEXOR-XR) 150 MG Cp24, Take 1 capsule (150 mg  "total) by mouth every evening. With 37.5mg capsule. Total daily dose 187.5mg, Disp: 90 capsule, Rfl: 1    venlafaxine (EFFEXOR-XR) 37.5 MG 24 hr capsule, TAKE 1 CAPSULE BY MOUTH EVERY DAY WITH 150 MG FOR A TOTAL DAILY DOSE .5, Disp: 90 capsule, Rfl: 2    atorvastatin (LIPITOR) 20 MG tablet, Take 1 tablet (20 mg total) by mouth once daily., Disp: 30 tablet, Rfl: 5     Allergies:  Review of patient's allergies indicates:   Allergen Reactions    Nsaids (non-steroidal anti-inflammatory drug)     Neosporin (neomycin-polymyx) Rash       Physical Exam      Vital Signs  Temp: 98.2 °F (36.8 °C)  Temp Source: Oral  Pulse: 76  Resp: 20  SpO2: 97 %  BP: 116/86  BP Location: Left arm  Patient Position: Sitting  Pain Score: 0-No pain  Height and Weight  Height: 5' 2" (157.5 cm)  Weight: 114.1 kg (251 lb 8.7 oz)  BSA (Calculated - sq m): 2.23 sq meters  BMI (Calculated): 46  Weight in (lb) to have BMI = 25: 136.4      Patient Position: Sitting      Physical Exam  Vitals reviewed.   Constitutional:       General: She is not in acute distress.     Appearance: Normal appearance. She is obese. She is not ill-appearing, toxic-appearing or diaphoretic.   HENT:      Head: Normocephalic and atraumatic.      Right Ear: Tympanic membrane normal.      Left Ear: Tympanic membrane normal.      Mouth/Throat:      Pharynx: No posterior oropharyngeal erythema.   Eyes:      Extraocular Movements: Extraocular movements intact.      Conjunctiva/sclera: Conjunctivae normal.      Pupils: Pupils are equal, round, and reactive to light.   Neck:      Vascular: No carotid bruit.   Cardiovascular:      Rate and Rhythm: Normal rate and regular rhythm.      Pulses: Normal pulses.      Heart sounds: Normal heart sounds.   Pulmonary:      Effort: Pulmonary effort is normal. No respiratory distress.      Breath sounds: Normal breath sounds.   Abdominal:      General: Bowel sounds are normal. There is no distension.      Palpations: Abdomen is soft.      " Tenderness: There is no abdominal tenderness. There is no guarding or rebound.   Musculoskeletal:      Cervical back: Neck supple. No tenderness.   Neurological:      General: No focal deficit present.      Mental Status: She is alert and oriented to person, place, and time.   Psychiatric:         Mood and Affect: Mood normal.         Behavior: Behavior normal.          Laboratory:  CBC:  Recent Labs   Lab 11/14/22  1034   WBC 7.52   RBC 4.33   Hemoglobin 12.4   Hematocrit 38.7   Platelets 275   MCV 89   MCH 28.6   MCHC 32.0       CMP:  Recent Labs   Lab 08/25/22  0855 11/14/22  1034 09/11/23  1255 09/28/23  0854 10/31/23  0904 01/09/24  0855   Glucose 102 H   < > 107   < > 70  --    Calcium 9.7   < > 11.2 H   < > 9.9 10.1   Albumin 4.1   < > 4.1  --   --   --    Total Protein 6.8  --  6.5  --   --   --    Sodium 141   < > 137   < > 140  --    Potassium 3.6   < > 3.8   < > 4.2  --    CO2 31   < > 31   < > 26  --    Chloride 100   < > 96 L   < > 104  --    BUN 37 H   < > 36 H   < > 29 H  --    Creatinine 1.20 H   < > 1.56 H   < > 1.1  --    ALT 17  --  17  --   --   --    AST 18  --  17  --   --   --    Total Bilirubin 0.5  --  0.8  --   --   --     < > = values in this interval not displayed.          LIPIDS:  Recent Labs   Lab 02/22/22  1009 08/25/22  0855 06/01/23  0854 09/11/23  1255   TSH  --   --  0.04 L 0.02 L   HDL 57 60 64  --    Cholesterol 240 H 185 181  --    Triglycerides 210 H 178 H 199 H  --    LDL Cholesterol 148 H 98 87  --    HDL/Cholesterol Ratio 4.2 3.1 2.8  --    Non HDL Chol. (LDL+VLDL) 183 H 125 117  --        TSH:  Recent Labs   Lab 06/01/23  0854 09/11/23  1255   TSH 0.04 L 0.02 L       A1C:  Recent Labs   Lab 06/01/23  0854   Hemoglobin A1C 4.9        Other:   Recent Labs   Lab 09/11/23  1255   Vitamin B-12 717     Recent Labs   Lab 12/06/21  0922   Hepatitis C Ab NON-REACTIVE       Assessment/Plan     Bree Gonzales is a 63 y.o.female with:    Normal physical exam, routine  -      Comprehensive Metabolic Panel; Future; Expected date: 06/01/2024  -     Hemoglobin A1C; Future; Expected date: 06/01/2024  -     CBC Auto Differential; Future; Expected date: 06/01/2024  - Performed today.  Will check Basic labs.       Hypertension, unspecified type  - Controlled.  Cont current.      Hypercholesterolemia  -     atorvastatin (LIPITOR) 20 MG tablet; Take 1 tablet (20 mg total) by mouth once daily.  Dispense: 30 tablet; Refill: 5  -     Lipid Panel; Future; Expected date: 06/01/2024  - Controlled.  Cont current.     Stage 3a chronic kidney disease  - Stable.  Cont current regimen.     Postoperative hypothyroidism  - Planning to fu with a new Endo - Dr. Bartlett.     Morbid obesity with BMI of 45.0-49.9, adult  - Rec diet and exercise for wt loss.       Osteoporosis, unspecified osteoporosis type, unspecified pathological fracture presence  - - Stable.  Cont current regimen.     Atypical chest pain  -     IN OFFICE EKG 12-LEAD (to Muse)  -Check EKG.     Fibromyalgia  - Stable.  Cont current regimen.     Idiopathic peripheral neuropathy  - Can refer to Neurology in future for EMG.  Pt defers for now.  Stable.      Anxiety  - Stable.  Cont current regimen.     Chronic conditions status updated as per HPI.  Other than changes above, cont current medications and maintain follow up with specialists.   Follow up in about 6 months (around 9/18/2024) for fu chronic issues or sooner if needed.      Shana Jade MD  [facility]

## 2024-03-18 ENCOUNTER — OFFICE VISIT (OUTPATIENT)
Dept: PRIMARY CARE CLINIC | Facility: CLINIC | Age: 64
End: 2024-03-18
Payer: COMMERCIAL

## 2024-03-18 VITALS
HEIGHT: 62 IN | OXYGEN SATURATION: 97 % | DIASTOLIC BLOOD PRESSURE: 86 MMHG | SYSTOLIC BLOOD PRESSURE: 116 MMHG | HEART RATE: 76 BPM | TEMPERATURE: 98 F | BODY MASS INDEX: 46.29 KG/M2 | WEIGHT: 251.56 LBS | RESPIRATION RATE: 20 BRPM

## 2024-03-18 DIAGNOSIS — E89.0 POSTOPERATIVE HYPOTHYROIDISM: ICD-10-CM

## 2024-03-18 DIAGNOSIS — Z00.00 NORMAL PHYSICAL EXAM, ROUTINE: Primary | ICD-10-CM

## 2024-03-18 DIAGNOSIS — E66.01 MORBID OBESITY WITH BMI OF 45.0-49.9, ADULT: ICD-10-CM

## 2024-03-18 DIAGNOSIS — E78.00 HYPERCHOLESTEROLEMIA: ICD-10-CM

## 2024-03-18 DIAGNOSIS — N18.31 STAGE 3A CHRONIC KIDNEY DISEASE: ICD-10-CM

## 2024-03-18 DIAGNOSIS — M81.0 OSTEOPOROSIS, UNSPECIFIED OSTEOPOROSIS TYPE, UNSPECIFIED PATHOLOGICAL FRACTURE PRESENCE: ICD-10-CM

## 2024-03-18 DIAGNOSIS — I10 HYPERTENSION, UNSPECIFIED TYPE: ICD-10-CM

## 2024-03-18 DIAGNOSIS — F41.9 ANXIETY: ICD-10-CM

## 2024-03-18 DIAGNOSIS — R07.89 ATYPICAL CHEST PAIN: ICD-10-CM

## 2024-03-18 DIAGNOSIS — G60.9 IDIOPATHIC PERIPHERAL NEUROPATHY: ICD-10-CM

## 2024-03-18 DIAGNOSIS — M79.7 FIBROMYALGIA: ICD-10-CM

## 2024-03-18 PROBLEM — C73 THYROID CANCER: Status: RESOLVED | Noted: 2021-10-12 | Resolved: 2024-03-18

## 2024-03-18 LAB
OHS QRS DURATION: 76 MS
OHS QTC CALCULATION: 414 MS

## 2024-03-18 PROCEDURE — 3008F BODY MASS INDEX DOCD: CPT | Mod: CPTII,S$GLB,, | Performed by: INTERNAL MEDICINE

## 2024-03-18 PROCEDURE — 99999 PR PBB SHADOW E&M-EST. PATIENT-LVL IV: CPT | Mod: PBBFAC,,, | Performed by: INTERNAL MEDICINE

## 2024-03-18 PROCEDURE — 4010F ACE/ARB THERAPY RXD/TAKEN: CPT | Mod: CPTII,S$GLB,, | Performed by: INTERNAL MEDICINE

## 2024-03-18 PROCEDURE — 93005 ELECTROCARDIOGRAM TRACING: CPT | Mod: S$GLB,,, | Performed by: INTERNAL MEDICINE

## 2024-03-18 PROCEDURE — 93010 ELECTROCARDIOGRAM REPORT: CPT | Mod: S$GLB,,, | Performed by: INTERNAL MEDICINE

## 2024-03-18 PROCEDURE — 1160F RVW MEDS BY RX/DR IN RCRD: CPT | Mod: CPTII,S$GLB,, | Performed by: INTERNAL MEDICINE

## 2024-03-18 PROCEDURE — 1159F MED LIST DOCD IN RCRD: CPT | Mod: CPTII,S$GLB,, | Performed by: INTERNAL MEDICINE

## 2024-03-18 PROCEDURE — 3074F SYST BP LT 130 MM HG: CPT | Mod: CPTII,S$GLB,, | Performed by: INTERNAL MEDICINE

## 2024-03-18 PROCEDURE — 99396 PREV VISIT EST AGE 40-64: CPT | Mod: S$GLB,,, | Performed by: INTERNAL MEDICINE

## 2024-03-18 PROCEDURE — 3079F DIAST BP 80-89 MM HG: CPT | Mod: CPTII,S$GLB,, | Performed by: INTERNAL MEDICINE

## 2024-03-18 RX ORDER — ATORVASTATIN CALCIUM 20 MG/1
20 TABLET, FILM COATED ORAL DAILY
Qty: 30 TABLET | Refills: 5 | Status: SHIPPED | OUTPATIENT
Start: 2024-03-18

## 2024-03-19 NOTE — PROGRESS NOTES
I sent pt a my chart message -  I reviewed your EKG which showed a normal sinus rhythm and possible left atrial enlargement which was similar to your previous EKG. Let me know if you have any further Chest pain or Blood pressure issues.  No further intervention necessary at this time.   Dr. ROGERS

## 2024-03-22 ENCOUNTER — TELEPHONE (OUTPATIENT)
Dept: OBSTETRICS AND GYNECOLOGY | Facility: CLINIC | Age: 64
End: 2024-03-22
Payer: COMMERCIAL

## 2024-03-22 DIAGNOSIS — Z12.31 SCREENING MAMMOGRAM, ENCOUNTER FOR: Primary | ICD-10-CM

## 2024-03-25 DIAGNOSIS — I10 HYPERTENSION, UNSPECIFIED TYPE: ICD-10-CM

## 2024-03-25 NOTE — TELEPHONE ENCOUNTER
Care Due:                  Date            Visit Type   Department     Provider  --------------------------------------------------------------------------------                                EP -                              PRIMARY      [facility] PRIMARY  Last Visit: 03-      CARE ([facility])   CARE           Shana Jade                              EP -                              PRIMARY      [facility] PRIMARY  Next Visit: 09-      CARE ([facility])   CARE           Shana  Gijuancarlosrone                                                            Last  Test          Frequency    Reason                     Performed    Due Date  --------------------------------------------------------------------------------    Lipid Panel.  12 months..  atorvastatin.............  06- 05-    Health [facility] Embedded Care Due Messages. Reference number: [id number].   3/25/2024 12:30:25 AM CDT

## 2024-03-26 RX ORDER — OLMESARTAN MEDOXOMIL 40 MG/1
40 TABLET ORAL
Qty: 90 TABLET | Refills: 2 | Status: SHIPPED | OUTPATIENT
Start: 2024-03-26

## 2024-03-26 NOTE — TELEPHONE ENCOUNTER
Refill Decision Note   Bree Gonzales  is requesting a refill authorization.  Brief Assessment and Rationale for Refill:  Approve     Medication Therapy Plan:  FLOS 06/03/24      Comments:     Note composed:6:01 AM 03/26/2024

## 2024-05-10 RX ORDER — METOPROLOL SUCCINATE 100 MG/1
100 TABLET, EXTENDED RELEASE ORAL
Qty: 90 TABLET | Refills: 3 | Status: SHIPPED | OUTPATIENT
Start: 2024-05-10

## 2024-05-10 NOTE — TELEPHONE ENCOUNTER
Refill Decision Note   Bree Christian  is requesting a refill authorization.  Brief Assessment and Rationale for Refill:  Approve     Medication Therapy Plan:         Comments:     Note composed:10:03 AM 05/10/2024

## 2024-05-10 NOTE — TELEPHONE ENCOUNTER
No care due was identified.  Health [facility] Embedded Care Due Messages. Reference number: [id number].   5/10/2024 12:07:26 AM CDT

## 2024-05-13 ENCOUNTER — OFFICE VISIT (OUTPATIENT)
Dept: ENDOCRINOLOGY | Facility: CLINIC | Age: 64
End: 2024-05-13
Payer: COMMERCIAL

## 2024-05-13 VITALS
HEART RATE: 77 BPM | BODY MASS INDEX: 46.19 KG/M2 | DIASTOLIC BLOOD PRESSURE: 82 MMHG | OXYGEN SATURATION: 96 % | HEIGHT: 62 IN | WEIGHT: 251 LBS | SYSTOLIC BLOOD PRESSURE: 119 MMHG

## 2024-05-13 DIAGNOSIS — M81.0 OSTEOPOROSIS, UNSPECIFIED OSTEOPOROSIS TYPE, UNSPECIFIED PATHOLOGICAL FRACTURE PRESENCE: ICD-10-CM

## 2024-05-13 DIAGNOSIS — C73 THYROID CANCER: ICD-10-CM

## 2024-05-13 DIAGNOSIS — E89.0 POSTOPERATIVE HYPOTHYROIDISM: Primary | ICD-10-CM

## 2024-05-13 PROCEDURE — 1159F MED LIST DOCD IN RCRD: CPT | Mod: CPTII,S$GLB,, | Performed by: INTERNAL MEDICINE

## 2024-05-13 PROCEDURE — 3008F BODY MASS INDEX DOCD: CPT | Mod: CPTII,S$GLB,, | Performed by: INTERNAL MEDICINE

## 2024-05-13 PROCEDURE — 1160F RVW MEDS BY RX/DR IN RCRD: CPT | Mod: CPTII,S$GLB,, | Performed by: INTERNAL MEDICINE

## 2024-05-13 PROCEDURE — 3079F DIAST BP 80-89 MM HG: CPT | Mod: CPTII,S$GLB,, | Performed by: INTERNAL MEDICINE

## 2024-05-13 PROCEDURE — 99204 OFFICE O/P NEW MOD 45 MIN: CPT | Mod: S$GLB,,, | Performed by: INTERNAL MEDICINE

## 2024-05-13 PROCEDURE — 99999 PR PBB SHADOW E&M-EST. PATIENT-LVL IV: CPT | Mod: PBBFAC,,, | Performed by: INTERNAL MEDICINE

## 2024-05-13 PROCEDURE — G2211 COMPLEX E/M VISIT ADD ON: HCPCS | Mod: S$GLB,,, | Performed by: INTERNAL MEDICINE

## 2024-05-13 PROCEDURE — 3074F SYST BP LT 130 MM HG: CPT | Mod: CPTII,S$GLB,, | Performed by: INTERNAL MEDICINE

## 2024-05-13 PROCEDURE — 4010F ACE/ARB THERAPY RXD/TAKEN: CPT | Mod: CPTII,S$GLB,, | Performed by: INTERNAL MEDICINE

## 2024-05-13 NOTE — PROGRESS NOTES
"ENDOCRINOLOGY NEW PATIENT VISIT: 05/13/2024    Subjective:      Patient ID: Bree Gonzales is a 63 y.o. female.    Chief Complaint:  Thyroid cancer    History of Present Illness    Regarding Thyroid Cancer:  Previously seen by Dr. Vitale at outside clinic, now transitioning to our clinic. No records on file.    - Status post total thyroidectomy 03/18/02 with CN neck dissection with Dr. Krish Clarke  - Received mCi of radioactive iodine on: she had radioactive iodine in April and December that year  - Thyroglobulin level: per patient has always been "at goal", undetectable all along  - She was told it was low risk disease  - Current relevant medications: levothyroxine T4 (Synthroid) 200 mcg daily     Dec/14/23  Tg undetectable   Tg ab <1  TSH 0.02    January 2024 got neck ultrasound for dysphagia - told it was "okay" with no visible thyroid tissue. This was also done at Dr. Vitale's clinic.    Lab Results   Component Value Date    TSH 0.02 (L) 09/11/2023    TSH 0.04 (L) 06/01/2023     Patient tells me she had hypothyroidism before having her thyroidectomy.  She has been on levothyroxine 200 mcg 6 pills/ week (skips Wednesdays). She has some fatigue and inability to lose weight but no hyperthyroid symptoms.      With regards to osteoporosis:   Current osteoporosis medication:   Prolia - started 2021, last dose Dec/2023  Managed by rheumatology    Sep/2023 DXA      Fracture history:  Age 62: foot fracture - non-traumatic fx  Post-menopausal? Age?: 49  ERT after menopause?: yes, but stopped given breast ca hx in family      Lab Results   Component Value Date    CALCIUM 10.1 01/09/2024    CALCIUM 9.9 10/31/2023    CALCIUM 9.4 09/28/2023    PHOS 3.6 11/14/2022    TSH 0.02 (L) 09/11/2023     ROS:   As above    Objective:     /82   Pulse 77   Ht 5' 2" (1.575 m)   Wt 113.9 kg (250 lb 15.9 oz)   SpO2 96%   BMI 45.91 kg/m²   BP Readings from Last 3 Encounters:   05/13/24 119/82   03/18/24 116/86   03/06/24 135/89 " "    Wt Readings from Last 1 Encounters:   05/13/24 1047 113.9 kg (250 lb 15.9 oz)     Body mass index is 45.91 kg/m².      Physical Exam  Vitals reviewed.   Constitutional:       General: She is not in acute distress.     Appearance: Normal appearance. She is not toxic-appearing.   Eyes:      Extraocular Movements: Extraocular movements intact.      Conjunctiva/sclera: Conjunctivae normal.      Pupils: Pupils are equal, round, and reactive to light.   Neck:      Thyroid: No thyroid mass, thyromegaly or thyroid tenderness.   Pulmonary:      Effort: Pulmonary effort is normal. No respiratory distress.   Musculoskeletal:      Cervical back: Neck supple.   Lymphadenopathy:      Cervical: No cervical adenopathy.   Skin:     General: Skin is warm and dry.   Neurological:      General: No focal deficit present.      Mental Status: She is alert and oriented to person, place, and time.   Psychiatric:         Mood and Affect: Mood normal.         Thought Content: Thought content normal.            Lab Review:   Lab Results   Component Value Date    HGBA1C 4.9 06/01/2023     Lab Results   Component Value Date    CHOL 181 06/01/2023    HDL 64 06/01/2023    LDLCALC 87 06/01/2023    TRIG 199 (H) 06/01/2023    CHOLHDL 2.8 06/01/2023     Lab Results   Component Value Date     10/31/2023    K 4.2 10/31/2023     10/31/2023    CO2 26 10/31/2023    GLU 70 10/31/2023    BUN 29 (H) 10/31/2023    CREATININE 1.1 10/31/2023    CALCIUM 10.1 01/09/2024    PROT 6.5 09/11/2023    ALBUMIN 4.1 09/11/2023    BILITOT 0.8 09/11/2023    AST 17 09/11/2023    ALT 17 09/11/2023    ANIONGAP 10 10/31/2023    TSH 0.02 (L) 09/11/2023     No results found for: "[id number]"    Assessment and Plan     Osteoporosis  She is being managed by Rheumatology at [facility].  She has been treated with Prolia since 2021, last dose December 2023.  She is interested in stopping Prolia, I did warn her that she could not skip a dose of Prolia without getting " treatment with another agent as that might lead to severe bone density loss.  Discussed fall precautions.    Hypothyroidism  Postoperative hypothyroidism treated with levothyroxine 200 mcg 6 pills a week.  Her TSH is too suppressed at this dose.  Considering she had what seems to be low risk disease and has had undetectable thyroglobulin for the past few years, it seems reasonable to aim for higher TSH values, as she would be prone to increased bone loss and cardiovascular risk if she were hyperthyroid for a prolonged period of time.    -decreased levothyroxine dose to 200 mcg 5-1/2 pills a week  -recheck TSH in 6 weeks    Thyroid cancer  Patient was treated with thyroidectomy and central lymph node dissection, with subsequent radioactive iodine x2.  Presumably papillary thyroid carcinoma.  We do not have detailed records with dosing from that time but according to patient her thyroglobulin has been negative for the last many years and she was told she has low risk disease.  A thyroid ultrasound was done January 2024 it an outside clinic which revealed no concerning images in her thyroid bed.    -Repeat Tg annually  -Okay to target low-normal TSH     RTC 1 yr    Peggy Elder MD  [facility], 6th Floor  [address] [place], [place], [address]    Office: [phone]  Fax: [phone]

## 2024-05-13 NOTE — ASSESSMENT & PLAN NOTE
Patient was treated with thyroidectomy and central lymph node dissection, with subsequent radioactive iodine x2.  Presumably papillary thyroid carcinoma.  We do not have detailed records with dosing from that time but according to patient her thyroglobulin has been negative for the last many years and she was told she has low risk disease.  A thyroid ultrasound was done January 2024 it an outside clinic which revealed no concerning images in her thyroid bed.    -Repeat Tg annually  -Okay to target low-normal TSH

## 2024-05-13 NOTE — PATIENT INSTRUCTIONS
Please start taking 1/2 pill one day a week and skip Wednesdays. Total 5 1/2 pills a week.    Fax number for our clinic [phone]

## 2024-05-13 NOTE — ASSESSMENT & PLAN NOTE
Postoperative hypothyroidism treated with levothyroxine 200 mcg 6 pills a week.  Her TSH is too suppressed at this dose.  Considering she had what seems to be low risk disease and has had undetectable thyroglobulin for the past few years, it seems reasonable to aim for higher TSH values, as she would be prone to increased bone loss and cardiovascular risk if she were hyperthyroid for a prolonged period of time.    -decreased levothyroxine dose to 200 mcg 5-1/2 pills a week  -recheck TSH in 6 weeks

## 2024-05-16 NOTE — PROGRESS NOTES
I have reviewed and concur with Dr. Elder's history, physical, assessment, and plan.  I have personally interviewed and examined the patient.    Remote history of PTC with [facility] x 2. No pathology available at time of visit. Thyroglobulin undetectable and given this and many years since diagnosis would recommend backing off on TSH suppression as benefit likely does not outweigh risk. She is in agreement with this  On Prolia managed by rheumatology    Visit today included increased complexity associated with the care of the problems addressed and managing the longitudinal care of the patient due to the serious and/or complex managed problems       Yisel Bartlett MD

## 2024-05-27 ENCOUNTER — PATIENT MESSAGE (OUTPATIENT)
Dept: PRIMARY CARE CLINIC | Facility: CLINIC | Age: 64
End: 2024-05-27
Payer: COMMERCIAL

## 2024-05-27 NOTE — TELEPHONE ENCOUNTER
S/W the patient and faxed her lab orders to her email just in case [facility] didn't receive it.  Also said it has to be after 06/01.

## 2024-05-30 ENCOUNTER — HOSPITAL ENCOUNTER (OUTPATIENT)
Dept: RADIOLOGY | Facility: HOSPITAL | Age: 64
Discharge: HOME | End: 2024-05-30
Attending: OBSTETRICS & GYNECOLOGY
Payer: COMMERCIAL

## 2024-05-30 ENCOUNTER — PATIENT MESSAGE (OUTPATIENT)
Dept: OBSTETRICS AND GYNECOLOGY | Facility: CLINIC | Age: 64
End: 2024-05-30
Payer: COMMERCIAL

## 2024-05-30 VITALS — WEIGHT: 250 LBS | HEIGHT: 62 IN | BODY MASS INDEX: 46.01 KG/M2

## 2024-05-30 DIAGNOSIS — Z12.31 SCREENING MAMMOGRAM, ENCOUNTER FOR: ICD-10-CM

## 2024-05-30 PROCEDURE — 77067 SCR MAMMO BI INCL CAD: CPT | Mod: 26,,, | Performed by: RADIOLOGY

## 2024-05-30 PROCEDURE — 77063 BREAST TOMOSYNTHESIS BI: CPT | Mod: 26,,, | Performed by: RADIOLOGY

## 2024-05-30 PROCEDURE — 77063 BREAST TOMOSYNTHESIS BI: CPT | Mod: TC

## 2024-06-07 LAB
ALBUMIN SERPL-MCNC: 4.2 G/DL (ref 3.6–5.1)
ALBUMIN/GLOB SERPL: 1.7 (CALC) (ref 1–2.5)
ALP SERPL-CCNC: 93 U/L (ref 37–153)
ALT SERPL-CCNC: 19 U/L (ref 6–29)
AST SERPL-CCNC: 19 U/L (ref 10–35)
BASOPHILS # BLD AUTO: 56 CELLS/UL (ref 0–200)
BASOPHILS NFR BLD AUTO: 0.7 %
BILIRUB SERPL-MCNC: 0.6 MG/DL (ref 0.2–1.2)
BUN SERPL-MCNC: 22 MG/DL (ref 7–25)
BUN/CREAT SERPL: 17 (CALC) (ref 6–22)
CALCIUM SERPL-MCNC: 9.8 MG/DL (ref 8.6–10.4)
CHLORIDE SERPL-SCNC: 101 MMOL/L (ref 98–110)
CHOLEST SERPL-MCNC: 203 MG/DL
CHOLEST/HDLC SERPL: 3.4 (CALC)
CO2 SERPL-SCNC: 32 MMOL/L (ref 20–32)
CREAT SERPL-MCNC: 1.29 MG/DL (ref 0.5–1.05)
EGFR: 47 ML/MIN/1.73M2
EOSINOPHIL # BLD AUTO: 208 CELLS/UL (ref 15–500)
EOSINOPHIL NFR BLD AUTO: 2.6 %
ERYTHROCYTE [DISTWIDTH] IN BLOOD BY AUTOMATED COUNT: 13.1 % (ref 11–15)
GLOBULIN SER CALC-MCNC: 2.5 G/DL (CALC) (ref 1.9–3.7)
GLUCOSE SERPL-MCNC: 89 MG/DL (ref 65–99)
HBA1C MFR BLD: 4.9 % OF TOTAL HGB
HCT VFR BLD AUTO: 40.9 % (ref 35–45)
HDLC SERPL-MCNC: 59 MG/DL
HGB BLD-MCNC: 13.3 G/DL (ref 11.7–15.5)
LDLC SERPL CALC-MCNC: 114 MG/DL (CALC)
LYMPHOCYTES # BLD AUTO: 1280 CELLS/UL (ref 850–3900)
LYMPHOCYTES NFR BLD AUTO: 16 %
MCH RBC QN AUTO: 30.8 PG (ref 27–33)
MCHC RBC AUTO-ENTMCNC: 32.5 G/DL (ref 32–36)
MCV RBC AUTO: 94.7 FL (ref 80–100)
MONOCYTES # BLD AUTO: 672 CELLS/UL (ref 200–950)
MONOCYTES NFR BLD AUTO: 8.4 %
NEUTROPHILS # BLD AUTO: 5784 CELLS/UL (ref 1500–7800)
NEUTROPHILS NFR BLD AUTO: 72.3 %
NONHDLC SERPL-MCNC: 144 MG/DL (CALC)
PLATELET # BLD AUTO: 285 THOUSAND/UL (ref 140–400)
PMV BLD REES-ECKER: 9.6 FL (ref 7.5–12.5)
POTASSIUM SERPL-SCNC: 4.8 MMOL/L (ref 3.5–5.3)
PROT SERPL-MCNC: 6.7 G/DL (ref 6.1–8.1)
RBC # BLD AUTO: 4.32 MILLION/UL (ref 3.8–5.1)
SODIUM SERPL-SCNC: 139 MMOL/L (ref 135–146)
TRIGL SERPL-MCNC: 189 MG/DL
WBC # BLD AUTO: 8 THOUSAND/UL (ref 3.8–10.8)

## 2024-06-08 NOTE — PROGRESS NOTES
I sent pt a my chart message -  I reviewed your labs.  Your Ha1c was normal at 4.9.  Your Cholesterol looked a little high. Let's continue you  atorvastatin at your current dose and consider increasing it in future since it has been making you feel more flatulent. Let me know if this worsens. Your kidney function was stable and liver functions looked good.  Your Calcium was normal. You are not anemic. No further recommendations at this time.  Dr. ROGERS

## 2024-06-10 ENCOUNTER — PATIENT OUTREACH (OUTPATIENT)
Dept: ADMINISTRATIVE | Facility: HOSPITAL | Age: 64
End: 2024-06-10
Payer: COMMERCIAL

## 2024-06-10 ENCOUNTER — TELEPHONE (OUTPATIENT)
Dept: PRIMARY CARE CLINIC | Facility: CLINIC | Age: 64
End: 2024-06-10
Payer: COMMERCIAL

## 2024-06-10 NOTE — TELEPHONE ENCOUNTER
Chief Complaint   Patient presents with    Ear Fullness     /60   Pulse 71   Resp 16   Ht 5' 10\" (1.778 m)   Wt 160 lb (72.6 kg)   SpO2 98%   BMI 22.96 kg/m²   1. Have you been to the ER, urgent care clinic since your last visit? Hospitalized since your last visit? No    2. Have you seen or consulted any other health care providers outside of the [address] [place] [address] since your last visit? Include any pap smears or colon screening.  No Spoke to patient, advised her of her lab results.

## 2024-06-10 NOTE — PROGRESS NOTES
Patient due for the following    Annual [facility]     Pneumococcal Vaccines (Age 0-64) (2 of 2 - PCV)    RSV Vaccine (Age 60+ and Pregnant patients) (1 - 1-dose 60+ series)    COVID-19 Vaccine (7 - 2023-24 season)      Immunizations: reviewed and updated  Care Everywhere: triggered  Care Teams: up to date  Outreach: completed

## 2024-06-13 ENCOUNTER — PATIENT MESSAGE (OUTPATIENT)
Dept: ENDOCRINOLOGY | Facility: CLINIC | Age: 64
End: 2024-06-13
Payer: COMMERCIAL

## 2024-06-13 DIAGNOSIS — E89.0 POSTOPERATIVE HYPOTHYROIDISM: Primary | ICD-10-CM

## 2024-06-25 ENCOUNTER — PATIENT MESSAGE (OUTPATIENT)
Dept: ENDOCRINOLOGY | Facility: CLINIC | Age: 64
End: 2024-06-25
Payer: COMMERCIAL

## 2024-06-25 DIAGNOSIS — E89.0 POSTOPERATIVE HYPOTHYROIDISM: Primary | ICD-10-CM

## 2024-06-25 NOTE — TELEPHONE ENCOUNTER
Called patient to discuss test results, TSH still suppressed on 5-1/2 pills of 200 mcg levothyroxine.  We will decrease to 5 pills weekly and recheck in another 4-6 weeks.

## 2024-08-09 ENCOUNTER — PATIENT MESSAGE (OUTPATIENT)
Dept: ENDOCRINOLOGY | Facility: CLINIC | Age: 64
End: 2024-08-09
Payer: COMMERCIAL

## 2024-08-09 ENCOUNTER — OFFICE VISIT (OUTPATIENT)
Dept: OBSTETRICS AND GYNECOLOGY | Facility: CLINIC | Age: 64
End: 2024-08-09
Payer: COMMERCIAL

## 2024-08-09 VITALS
WEIGHT: 253.19 LBS | SYSTOLIC BLOOD PRESSURE: 125 MMHG | BODY MASS INDEX: 46.31 KG/M2 | DIASTOLIC BLOOD PRESSURE: 81 MMHG

## 2024-08-09 DIAGNOSIS — C73 THYROID CANCER: Primary | ICD-10-CM

## 2024-08-09 DIAGNOSIS — Z01.419 ENCOUNTER FOR WELL WOMAN EXAM WITH ROUTINE GYNECOLOGICAL EXAM: Primary | ICD-10-CM

## 2024-08-09 PROCEDURE — 99999 PR PBB SHADOW E&M-EST. PATIENT-LVL III: CPT | Mod: PBBFAC,,, | Performed by: OBSTETRICS & GYNECOLOGY

## 2024-08-12 RX ORDER — LEVOTHYROXINE SODIUM 125 UG/1
125 TABLET ORAL
Qty: 30 TABLET | Refills: 11 | Status: SHIPPED | OUTPATIENT
Start: 2024-08-12 | End: 2025-08-12

## 2024-08-18 DIAGNOSIS — F32.A DEPRESSIVE DISORDER: ICD-10-CM

## 2024-08-18 RX ORDER — VENLAFAXINE HYDROCHLORIDE 150 MG/1
150 CAPSULE, EXTENDED RELEASE ORAL NIGHTLY
Qty: 90 CAPSULE | Refills: 1 | Status: SHIPPED | OUTPATIENT
Start: 2024-08-18

## 2024-08-19 NOTE — TELEPHONE ENCOUNTER
No care due was identified.  [facility] Embedded Care Due Messages. Reference number: [id number].   8/18/2024 8:38:14 PM CDT

## 2024-09-23 ENCOUNTER — TELEPHONE (OUTPATIENT)
Dept: ORTHOPEDICS | Facility: CLINIC | Age: 64
End: 2024-09-23
Payer: COMMERCIAL

## 2024-09-24 ENCOUNTER — HOSPITAL ENCOUNTER (OUTPATIENT)
Dept: RADIOLOGY | Facility: HOSPITAL | Age: 64
Discharge: HOME | End: 2024-09-24
Attending: NURSE PRACTITIONER
Payer: COMMERCIAL

## 2024-09-24 ENCOUNTER — OFFICE VISIT (OUTPATIENT)
Dept: ORTHOPEDICS | Facility: CLINIC | Age: 64
End: 2024-09-24
Payer: COMMERCIAL

## 2024-09-24 VITALS — BODY MASS INDEX: 46.57 KG/M2 | HEIGHT: 62 IN | WEIGHT: 253.06 LBS

## 2024-09-24 DIAGNOSIS — M79.671 RIGHT FOOT PAIN: Primary | ICD-10-CM

## 2024-09-24 DIAGNOSIS — M79.671 RIGHT FOOT PAIN: ICD-10-CM

## 2024-09-24 DIAGNOSIS — M76.72 PERONEAL TENDONITIS, LEFT: Primary | ICD-10-CM

## 2024-09-24 PROCEDURE — 3044F HG A1C LEVEL LT 7.0%: CPT | Mod: CPTII,S$GLB,, | Performed by: NURSE PRACTITIONER

## 2024-09-24 PROCEDURE — 99999 PR PBB SHADOW E&M-EST. PATIENT-LVL III: CPT | Mod: PBBFAC,,, | Performed by: NURSE PRACTITIONER

## 2024-09-24 PROCEDURE — 1160F RVW MEDS BY RX/DR IN RCRD: CPT | Mod: CPTII,S$GLB,, | Performed by: NURSE PRACTITIONER

## 2024-09-24 PROCEDURE — 73630 X-RAY EXAM OF FOOT: CPT | Mod: 26,RT,, | Performed by: INTERNAL MEDICINE

## 2024-09-24 PROCEDURE — 1159F MED LIST DOCD IN RCRD: CPT | Mod: CPTII,S$GLB,, | Performed by: NURSE PRACTITIONER

## 2024-09-24 PROCEDURE — 99214 OFFICE O/P EST MOD 30 MIN: CPT | Mod: S$GLB,,, | Performed by: NURSE PRACTITIONER

## 2024-09-24 PROCEDURE — 4010F ACE/ARB THERAPY RXD/TAKEN: CPT | Mod: CPTII,S$GLB,, | Performed by: NURSE PRACTITIONER

## 2024-09-24 PROCEDURE — 3008F BODY MASS INDEX DOCD: CPT | Mod: CPTII,S$GLB,, | Performed by: NURSE PRACTITIONER

## 2024-09-24 PROCEDURE — 73630 X-RAY EXAM OF FOOT: CPT | Mod: TC,RT

## 2024-09-24 RX ORDER — METHYLPREDNISOLONE 4 MG/1
TABLET ORAL
Qty: 1 EACH | Refills: 0 | Status: SHIPPED | OUTPATIENT
Start: 2024-09-24 | End: 2024-10-15

## 2024-09-24 NOTE — PROGRESS NOTES
SUBJECTIVE:     Chief Complaint & History of Present Illness:  Bree Gonzales is a Established 64 y.o. year old female patient here for follow up for her 5th MT fracture.  She was last seen one year ago.  Since her last visit, patient has noticed an increasing pain on the lateral side of her foot for the past 3 days.  She describes the pain as burning like pain that she rates at a 3 to 4/10.  She has chronic neuropathy in her toes which she takes Lyrica twice a day 4.  She does not believe the Lyrica is treating her symptoms enough.  Rheumatology is currently managing her Lyrica.  Patient thinks that she may have broken another bone in his the reason for a visit today.    Review of patient's allergies indicates:   Allergen Reactions    Nsaids (non-steroidal anti-inflammatory drug)     Neosporin (neomycin-polymyx) Rash         Current Outpatient Medications   Medication Sig Dispense Refill    atorvastatin (LIPITOR) 20 MG tablet Take 1 tablet (20 mg total) by mouth once daily. 30 tablet 5    cetirizine 10 mg Cap Take by mouth.      denosumab (PROLIA) 60 mg/mL Syrg Inject 1 mL (60 mg total) into the skin every 6 (six) months.      levothyroxine (SYNTHROID) 125 MCG tablet Take 1 tablet (125 mcg total) by mouth before breakfast. 30 tablet 11    metoprolol succinate (TOPROL-XL) 100 MG 24 hr tablet TAKE 1 TABLET BY MOUTH EVERY DAY 90 tablet 3    olmesartan (BENICAR) 40 MG tablet TAKE 1 TABLET BY MOUTH EVERY DAY 90 tablet 2    pregabalin (LYRICA) 50 MG capsule Take 50 mg by mouth 2 (two) times a day.      venlafaxine (EFFEXOR-XR) 150 MG Cp24 TAKE 1 CAPSULE (150 MG TOTAL) BY MOUTH EVERY EVENING. WITH 37.5MG CAPSULE. TOTAL DAILY DOSE 187.5MG 90 capsule 1    venlafaxine (EFFEXOR-XR) 37.5 MG 24 hr capsule TAKE 1 CAPSULE BY MOUTH EVERY DAY WITH 150 MG FOR A TOTAL DAILY DOSE .5 90 capsule 2     No current facility-administered medications for this visit.       Past Medical History:   Diagnosis Date    BRCA1 negative      "BRCA2 negative     CKD (chronic kidney disease), stage III 10/12/2021    Family history of breast cancer     Fibromyalgia     Hypertension     Hypothyroidism     Malignant hyperthermia due to anesthesia     Osteoporosis     Skin cancer 10/12/2021    Thyroid cancer 10/12/2021       Past Surgical History:   Procedure Laterality Date    CATARACT EXTRACTION, BILATERAL      CHOLECYSTECTOMY  05/1981    DILATION AND CURETTAGE OF UTERUS      SKIN CANCER EXCISION      TOTAL THYROIDECTOMY  03/2002    TRIGGER FINGER RELEASE Right 11/2020       Family History   Problem Relation Name Age of Onset    Breast cancer Mother  74    Dementia Mother      Breast cancer Sister  51    Breast cancer Maternal Grandmother      Colon cancer Neg Hx      Ovarian cancer Neg Hx           Review of Systems:  ROS:  Constitutional: no fever or chills  Eyes: no visual changes  ENT: no nasal congestion or sore throat  Respiratory: no cough or shortness of breath  Cardiovascular: no chest pain or palpitations  Gastrointestinal: no nausea or vomiting, tolerating diet  Genitourinary: no hematuria or dysuria  Integument/Breast: no rash or pruritis  Hematologic/Lymphatic: no easy bruising or lymphadenopathy  Musculoskeletal:  5th metatarsal fracture on the right  Neurological: no seizures or tremors  Behavioral/Psych: no auditory or visual hallucinations  Endocrine: no heat or cold intolerance      OBJECTIVE:     PHYSICAL EXAM:  Vital Signs (Most Recent)  There were no vitals filed for this visit.     ,   Estimated body mass index is 46.31 kg/m² as calculated from the following:    Height as of 5/30/24: 5' 2" (1.575 m).    Weight as of 8/9/24: 114.8 kg (253 lb 3.2 oz).   General Appearance: Well nourished, well developed, in no acute distress.  HENT: Normal cephalic, oropharynx pink and moist  Eyes: PERRLA bilaterally and EOM x 4  Respiratory: Even and unlabored  Skin: Warm and Dry.   Psychiatric: AAO x 4, Mood & affect are normal.    right  " Foot/Ankle    General appearance: no acute distress, alert/oriented x3, appropriate mood and affect, looks stated age, obese, and well nourished  The examination was performed out of splint/cast  Skin: normal  Swelling: none  Warmth: no warmth  Tenderness:  None  ROM:  25° dorsiflexion and plantar flexion and 20° inversion and eversion  Strength: normal  Gait:  Normal  Stability: stable to testing and Cotton test: negative  Crepitus: no  Neurological Exam: normal  Vascular Exam: normal and pulse present    normal exam, no swelling, tenderness, instability; ligaments intact, full range of motion of all ankle/foot joints      RADIOGRAPHS:  X-ray of the right foot was obtained, there is a proximal 5th metatarsal fracture that has healed.  No other fractures noted. All radiographs were personally reviewed by me.    ASSESSMENT/PLAN:       ICD-10-CM ICD-9-CM   1. Peroneal tendonitis, left  M76.72 726.79     Plan:  -Bree Gonzales presents to clinic today with c/c right foot pain for the past 3 days.  Denies any recent trauma.  Describes the pain as a burning like pain that radiates along the lateral side of the foot up around her ankle.  -X-ray as above.    I discussed the x-ray findings with the patient.  No additional fractures were seen involving her foot.  I believe her pain is secondary to peroneal tendinitis.    I will treat the patient with a Medrol Dosepak.  Advised the patient if her symptoms persist or worsen then we will have her follow up with the foot and ankle team.

## 2024-09-27 NOTE — PROGRESS NOTES
[facility] Note    Chief Complaint      Chief Complaint   Patient presents with    Follow-up       History of Present Illness      Bree Gonzales is a 64 y.o.    F with HTN, Hypothyroidism, Depression, Anxiety, FM, OA, and Osteoporosis, Anemia of CKD III, HLD, Vit D def, FM, Peripheral neuropathy, Morbid Obesity, and fam h/o Breast Ca presents to fu chronic issues. Last visit - 3/18/24    Peroneal tendonintis - tx with medrol per Ortho - 9/24/24    5th Metatarsal Fx - MRI Foot - 7/19/23 - oblique fracture of her 5th metatarsal that does not go into the joint space. Fu by Ortho.        H/o Melanoma - Fu by Derm. Doing well. Had resection of lesion to Left cheek 6/17/21.       HTN - Pt controlled on Toprol 100 mg/d, and Olmesartan 40 mg/d. Rec inc hydration.  Could be related to her claritin.  Offered to add astelin pt declines.      Hypothyroidism - Pt on Brand Name Synthroid 125 mcg/d. Pt is s/p Total Thyroidectomy due to thyroid Ca. TSH was low at  0.16 in June  Fu by Dr. Tri Haile.       Depression - Pt on Venlafaxine  mg +37.5 mg/d. She recently moved and cares for her elderly Mom. Stable.       Anxiety -  Pt on Venlafaxine  mg +37.5 mg/d. Her mom has dementia. Stable       Anemia of CKD III - Fu by Dr. Sarmiento.        CKD III -  Fu by Dr. Sarmiento.  Pt could not afford Farxiga 10 mg/d because insurance would not cover it. Stable.       H/o Hypercalcemia - Calcium is 9.5  down from 11.2.  PTH - 57  - wnl - 1/9/24. We held her calcium suppl. and we stopped her  hydrochlorothiazide due to hypercalcemia.  I had her inc Toprol to 100 mg daily.  Kidney function remains stable. Continue to work on adequate hydration.      HLD - Pt on atorvastatin 20 mg QHS. She c/o flatulence since starting this. She does not feel we need to stop the medication or try an alternate medication.  She will alert me if this changes.  Can take prn Gas-X     Vit D def - Pt is on a MVI and Ca+D.      OA - Fu  by Rheum, Dr. Grimes. MRI Knee pain - 6/29/23. Fu by Ortho. S/p Euflexxa 8/10/23 and 8/24/23.      Osteoporosis - Pt on Prolia. Last inj was in Dec. Knuckles hurt. Can try Voltaren gel.      FM - Pt on Lyrica 25 mg BID.       Peripheral neuropathy - Foot pain - She c/o numbness and throbbing at night. Wearing socks help.  Cymbalta 20 mg/d no help. Pt is on Lyrica 50mg BID.  Vitamin B12 and folic acid levels were both normal. Unclear etiol.  Can refer to Neurology in future for EMG.  Pt defers for now.  Stable.       Morbid Obesity - BMI -46.85b Up 5 lb since last visit. She has not been exercising as much due to Foot and knee pain.  She tries to cut back on carbs/starches.   Recent normal HA1c. Pt tries to limit carbs.  Rec goal 150 min/wk.       fam h/o Breast Ca - Pt is BRCA neg. Due for MGM in May      H/o Malignant Hyperthermia -s/p anesthesia     Lab review:   6/8/22 - BUN/Cr - 32/1.21 GFR 49  3/17/22 - BUN/Cr - 38/1.19 - GFR - 50;  H/H - 11.2/36; iPTH - 81; Fe - 68; TIBC 490 -elev; %Sat - 14%; Vit D - 52  12/6/21 - BUN/Cr - 33/1.10; H/H - 12/37.4.     HCM - Flu - 9/21/23;  Tdap - 6/1/23;  PCV 13 - none;  PVX 23 - 1/25/17;  Shingrix - 8/26/20 and #2 - 11/11/21;  Zostavax - 2/24/15; COVID - 19 Vaccine (Pfiizer)  #1 2/26/21; #2 3/19/21; #3 - 10/26/21; # 4 8/1/22; # 5 6/1/23; MGM - 5/30/24- repeat 1 yr;  DEXA - 11/2020 - +Osteoporosis- had recent [facility] Dr. Grimes - will obtain copy for review.;  PAP - 5/26/23 - neg. ; Hep C Screen - 12/6/21 - neg. ;  HIV Screen - 12/6/21 - neg. ; C-scope - 6/7/22- Polyps, hemorrhoids - repeat 3 yrs;   Prev PCP - Dr. Duckworth;  Rheum - Dr. Grimes; Renal - Dr. Sarmiento; Derm - [facility]; Hand Sx - Dr. Quiroz; Ob/GYN - NP - Quiana Sotomayor; Endo - Dr. Vitale; Ophtho - Dr. Kennedy; GI - Dr. Bansal; Well visit - 3/18/24      Patient Care Team:  Shana Jade MD as PCP - General (Internal Medicine)  Amber Sotomayor MA (Inactive) as Care Coordinator  Praneeth Bansal MD  as Consulting Physician (Gastroenterology)  Gatito Grimes Jr., MD (Rheumatology)     Health Maintenance:  Immunization History   Administered Date(s) Administered    COVID-19 Vaccine 09/21/2023    COVID-19, MRNA, LN-S, PF (Pfizer) (Gray Cap) 07/31/2022    COVID-19, MRNA, LN-S, PF (Pfizer) (Purple Cap) 02/26/2021, 03/19/2021, 10/26/2021, 08/01/2022    COVID-19, mRNA, LNP-S, PF (Moderna) Ages 12+ 09/24/2024    COVID-19, mRNA, LNP-S, bivalent booster, PF (Moderna Omicron)12 + YEARS 06/01/2023    Influenza - Quadrivalent 10/03/2019    Influenza - Quadrivalent - MDCK - PF 08/26/2020, 09/13/2022, 09/21/2023    Influenza - Quadrivalent - PF *Preferred* (6 months and older) 10/02/2013, 10/07/2014, 09/30/2015, 10/20/2016, 10/05/2017, 10/03/2018, 10/12/2021    Pneumococcal Polysaccharide - 23 Valent 01/25/2017    Tdap 04/30/2014, 05/02/2014, 06/01/2023    Zoster 02/24/2015, 08/26/2020, 11/11/2021    Zoster Recombinant 08/26/2020, 11/11/2021      Health Maintenance   Topic Date Due    Mammogram  05/30/2025    Colorectal Cancer Screening  06/07/2025    Lipid Panel  06/06/2029    TETANUS VACCINE  06/01/2033    Hepatitis C Screening  Completed    Shingles Vaccine  Completed        Past Medical History:  Past Medical History:   Diagnosis Date    BRCA1 negative     BRCA2 negative     CKD (chronic kidney disease), stage III 10/12/2021    Family history of breast cancer     Fibromyalgia     Hypertension     Hypothyroidism     Malignant hyperthermia due to anesthesia     Osteoporosis     Skin cancer 10/12/2021    Thyroid cancer 10/12/2021       Past Surgical History:   has a past surgical history that includes Cholecystectomy (05/1981); Total thyroidectomy (03/2002); Skin cancer excision; Dilation and curettage of uterus; Trigger finger release (Right, 11/2020); Cataract extraction, bilateral; and Eye surgery (4/2017).    Family History:  family history includes Arthritis in her mother; Breast cancer in her maternal grandmother;  "Breast cancer (age of onset: 51) in her sister; Breast cancer (age of onset: 74) in her mother; Cancer in her mother and sister; Dementia in her mother; Hearing loss in her father; Mental illness in her mother.     Social History:  Social History     Tobacco Use    Smoking status: Never    Smokeless tobacco: Never   Substance Use Topics    Alcohol use: Never    Drug use: Never       Review of Systems   Constitutional:  Negative for chills, diaphoresis and fever.   HENT:  Negative for hearing loss.    Eyes:  Negative for visual disturbance.   Respiratory:  Negative for chest tightness and shortness of breath.    Cardiovascular:  Negative for chest pain, palpitations and leg swelling.   Gastrointestinal:  Positive for diarrhea. Negative for abdominal pain, constipation, nausea and vomiting.   Endocrine: Positive for heat intolerance and polydipsia. Negative for cold intolerance.   Genitourinary:  Negative for bladder incontinence, dysuria, frequency and vaginal bleeding.   Musculoskeletal:  Positive for arthralgias. Negative for myalgias.        Left knee - has meniscal tear.    Neurological:  Negative for dizziness and headaches.   Psychiatric/Behavioral:  Positive for depressed mood. The patient is nervous/anxious.         "I'm  a worrier. I let things get to me". Son is getting  in feb.         Medications:    Current Outpatient Medications:     cetirizine 10 mg Cap, Take by mouth., Disp: , Rfl:     denosumab (PROLIA) 60 mg/mL Syrg, Inject 1 mL (60 mg total) into the skin every 6 (six) months., Disp: , Rfl:     levothyroxine (SYNTHROID) 125 MCG tablet, Take 1 tablet (125 mcg total) by mouth before breakfast., Disp: 30 tablet, Rfl: 11    metoprolol succinate (TOPROL-XL) 100 MG 24 hr tablet, TAKE 1 TABLET BY MOUTH EVERY DAY, Disp: 90 tablet, Rfl: 3    olmesartan (BENICAR) 40 MG tablet, TAKE 1 TABLET BY MOUTH EVERY DAY, Disp: 90 tablet, Rfl: 2    pregabalin (LYRICA) 50 MG capsule, Take 50 mg by mouth 2 (two) " "times a day., Disp: , Rfl:     venlafaxine (EFFEXOR-XR) 150 MG Cp24, TAKE 1 CAPSULE (150 MG TOTAL) BY MOUTH EVERY EVENING. WITH 37.5MG CAPSULE. TOTAL DAILY DOSE 187.5MG, Disp: 90 capsule, Rfl: 1    atorvastatin (LIPITOR) 20 MG tablet, Take 1 tablet (20 mg total) by mouth once daily., Disp: 90 tablet, Rfl: 1    venlafaxine (EFFEXOR-XR) 37.5 MG 24 hr capsule, TAKE 1 CAPSULE BY MOUTH EVERY DAY WITH 150 MG FOR A TOTAL DAILY DOSE .5, Disp: 90 capsule, Rfl: 2     Allergies:  Review of patient's allergies indicates:   Allergen Reactions    Nsaids (non-steroidal anti-inflammatory drug)     Neosporin (neomycin-polymyx) Rash       Physical Exam      Vital Signs  Temp: 97.4 °F (36.3 °C)  Temp Source: Temporal  Pulse: 81  Resp: 20  SpO2: 96 %  BP: 126/86  BP Location: Left arm  Patient Position: Sitting  Pain Score: 0-No pain  Height and Weight  Height: 5' 2" (157.5 cm)  Weight: 116.2 kg (256 lb 2.8 oz)  BSA (Calculated - sq m): 2.25 sq meters  BMI (Calculated): 46.8  Weight in (lb) to have BMI = 25: 136.4      Patient Position: Sitting      Physical Exam  Vitals reviewed.   Constitutional:       General: She is not in acute distress.     Appearance: Normal appearance. She is obese. She is not ill-appearing, toxic-appearing or diaphoretic.   HENT:      Head: Normocephalic and atraumatic.      Right Ear: Tympanic membrane normal.      Left Ear: Tympanic membrane normal.      Mouth/Throat:      Mouth: Mucous membranes are dry.   Eyes:      Extraocular Movements: Extraocular movements intact.      Conjunctiva/sclera: Conjunctivae normal.      Pupils: Pupils are equal, round, and reactive to light.   Neck:      Vascular: No carotid bruit.   Cardiovascular:      Rate and Rhythm: Normal rate and regular rhythm.      Pulses: Normal pulses.      Heart sounds: Normal heart sounds. No murmur heard.  Pulmonary:      Effort: No respiratory distress.      Breath sounds: Normal breath sounds. No wheezing.   Abdominal:      General: " Bowel sounds are normal. There is no distension.      Palpations: Abdomen is soft.      Tenderness: There is no abdominal tenderness. There is no guarding or rebound.   Skin:     General: Skin is warm.   Neurological:      General: No focal deficit present.      Mental Status: She is alert and oriented to person, place, and time.   Psychiatric:         Mood and Affect: Mood normal.         Behavior: Behavior normal.          Laboratory:  CBC:  Recent Labs   Lab 11/14/22  1034 06/06/24  0922   WBC 7.52 8.0   RBC 4.33 4.32   Hemoglobin 12.4 13.3   Hematocrit 38.7 40.9   Platelets 275 285   MCV 89 94.7   MCH 28.6 30.8   MCHC 32.0 32.5       CMP:  Recent Labs   Lab 08/25/22  0855 11/14/22  1034 09/11/23  1255 09/28/23  0854 06/06/24  0922   Glucose 102 H   < > 107   < > 89   Calcium 9.7   < > 11.2 H   < > 9.8   Albumin 4.1   < > 4.1  --  4.2   Total Protein 6.8  --  6.5  --  6.7   Sodium 141   < > 137   < > 139   Potassium 3.6   < > 3.8   < > 4.8   CO2 31   < > 31   < > 32   Chloride 100   < > 96 L   < > 101   BUN 37 H   < > 36 H   < > 22   Creatinine 1.20 H   < > 1.56 H   < > 1.29 H   ALT 17  --  17  --  19   AST 18  --  17  --  19   Total Bilirubin 0.5  --  0.8  --  0.6    < > = values in this interval not displayed.          LIPIDS:  Recent Labs   Lab 08/25/22  0855 06/01/23  0854 06/01/23  0854 09/11/23  1255 06/06/24  0922 06/24/24  0902 08/06/24  0929   TSH  --  0.04 L   < > 0.02 L  --  0.03 L 0.16 L   HDL 60 64  --   --  59  --   --    Cholesterol 185 181  --   --  203 H  --   --    Triglycerides 178 H 199 H  --   --  189 H  --   --    LDL Cholesterol 98 87  --   --  114 H  --   --    HDL/Cholesterol Ratio 3.1 2.8  --   --  3.4  --   --    Non HDL Chol. (LDL+VLDL) 125 117  --   --  144 H  --   --     < > = values in this interval not displayed.       TSH:  Recent Labs   Lab 09/11/23  1255 06/24/24  0902 08/06/24  0929   TSH 0.02 L 0.03 L 0.16 L       A1C:  Recent Labs   Lab 06/01/23  0854 06/06/24  0922    Hemoglobin A1C 4.9 4.9       Other:   Recent Labs   Lab 09/11/23  1255   Vitamin B-12 717     Recent Labs   Lab 12/06/21  0922   Hepatitis C Ab NON-REACTIVE       Assessment/Plan     Bree Gonzales is a 64 y.o.female with:    Hypertension, unspecified type  -     Comprehensive Metabolic Panel; Future; Expected date: 09/30/2024  - Controlled.  Cont current.     Stage 3a chronic kidney disease  - Stable.  Cont current regimen.    Hypercholesterolemia  -     atorvastatin (LIPITOR) 20 MG tablet; Take 1 tablet (20 mg total) by mouth once daily.  Dispense: 90 tablet; Refill: 1  -     Comprehensive Metabolic Panel; Future; Expected date: 09/30/2024  - Controlled.  Cont current.     Depressive disorder  -     venlafaxine (EFFEXOR-XR) 37.5 MG 24 hr capsule; TAKE 1 CAPSULE BY MOUTH EVERY DAY WITH 150 MG FOR A TOTAL DAILY DOSE .5  Dispense: 90 capsule; Refill: 2  - Stable.  Cont current regimen.    Anxiety  -     venlafaxine (EFFEXOR-XR) 37.5 MG 24 hr capsule; TAKE 1 CAPSULE BY MOUTH EVERY DAY WITH 150 MG FOR A TOTAL DAILY DOSE .5  Dispense: 90 capsule; Refill: 2  - Stable.  Cont current regimen.    Postoperative hypothyroidism  - Stable.  Cont current regimen. Due for repeat TSH per Endo. Will get drawn today.     Anemia in stage 3 chronic kidney disease, unspecified whether stage 3a or 3b CKD  - Stable.  Cont current regimen.    Fibromyalgia  - Stable.  Cont current regimen.    Idiopathic peripheral neuropathy  - She defers EMG for now.     Osteoporosis, unspecified osteoporosis type, unspecified pathological fracture presence  - Stable.  Cont current regimen.         Chronic conditions status updated as per HPI.  Other than changes above, cont current medications and maintain follow up with specialists.   Follow up in about 6 months (around 3/30/2025) for well visit or sooner if needed.      Shana Jade MD  [facility]

## 2024-09-30 ENCOUNTER — LAB VISIT (OUTPATIENT)
Dept: LAB | Facility: HOSPITAL | Age: 64
End: 2024-09-30
Attending: INTERNAL MEDICINE
Payer: COMMERCIAL

## 2024-09-30 ENCOUNTER — OFFICE VISIT (OUTPATIENT)
Dept: PRIMARY CARE CLINIC | Facility: CLINIC | Age: 64
End: 2024-09-30
Payer: COMMERCIAL

## 2024-09-30 VITALS
OXYGEN SATURATION: 96 % | DIASTOLIC BLOOD PRESSURE: 86 MMHG | HEART RATE: 81 BPM | HEIGHT: 62 IN | RESPIRATION RATE: 20 BRPM | WEIGHT: 256.19 LBS | BODY MASS INDEX: 47.15 KG/M2 | SYSTOLIC BLOOD PRESSURE: 126 MMHG | TEMPERATURE: 97 F

## 2024-09-30 DIAGNOSIS — N18.30 ANEMIA IN STAGE 3 CHRONIC KIDNEY DISEASE, UNSPECIFIED WHETHER STAGE 3A OR 3B CKD: ICD-10-CM

## 2024-09-30 DIAGNOSIS — E78.00 HYPERCHOLESTEROLEMIA: ICD-10-CM

## 2024-09-30 DIAGNOSIS — N18.31 STAGE 3A CHRONIC KIDNEY DISEASE: ICD-10-CM

## 2024-09-30 DIAGNOSIS — D63.1 ANEMIA IN STAGE 3 CHRONIC KIDNEY DISEASE, UNSPECIFIED WHETHER STAGE 3A OR 3B CKD: ICD-10-CM

## 2024-09-30 DIAGNOSIS — I10 HYPERTENSION, UNSPECIFIED TYPE: Primary | ICD-10-CM

## 2024-09-30 DIAGNOSIS — G60.9 IDIOPATHIC PERIPHERAL NEUROPATHY: ICD-10-CM

## 2024-09-30 DIAGNOSIS — I10 HYPERTENSION, UNSPECIFIED TYPE: ICD-10-CM

## 2024-09-30 DIAGNOSIS — F41.9 ANXIETY: ICD-10-CM

## 2024-09-30 DIAGNOSIS — E89.0 POSTOPERATIVE HYPOTHYROIDISM: ICD-10-CM

## 2024-09-30 DIAGNOSIS — M79.7 FIBROMYALGIA: ICD-10-CM

## 2024-09-30 DIAGNOSIS — F32.A DEPRESSIVE DISORDER: ICD-10-CM

## 2024-09-30 DIAGNOSIS — M81.0 OSTEOPOROSIS, UNSPECIFIED OSTEOPOROSIS TYPE, UNSPECIFIED PATHOLOGICAL FRACTURE PRESENCE: ICD-10-CM

## 2024-09-30 DIAGNOSIS — C73 THYROID CANCER: ICD-10-CM

## 2024-09-30 LAB
ALBUMIN SERPL BCP-MCNC: 3.7 G/DL (ref 3.5–5.2)
ALP SERPL-CCNC: 87 U/L (ref 55–135)
ALT SERPL W/O P-5'-P-CCNC: 19 U/L (ref 10–44)
ANION GAP SERPL CALC-SCNC: 7 MMOL/L (ref 8–16)
AST SERPL-CCNC: 15 U/L (ref 10–40)
BILIRUB SERPL-MCNC: 0.5 MG/DL (ref 0.1–1)
BUN SERPL-MCNC: 44 MG/DL (ref 8–23)
CALCIUM SERPL-MCNC: 9.8 MG/DL (ref 8.7–10.5)
CHLORIDE SERPL-SCNC: 99 MMOL/L (ref 95–110)
CO2 SERPL-SCNC: 32 MMOL/L (ref 23–29)
CREAT SERPL-MCNC: 1.3 MG/DL (ref 0.5–1.4)
EST. GFR  (NO RACE VARIABLE): 46.2 ML/MIN/1.73 M^2
GLUCOSE SERPL-MCNC: 76 MG/DL (ref 70–110)
POTASSIUM SERPL-SCNC: 4.2 MMOL/L (ref 3.5–5.1)
PROT SERPL-MCNC: 6.5 G/DL (ref 6–8.4)
SODIUM SERPL-SCNC: 138 MMOL/L (ref 136–145)
TSH SERPL DL<=0.005 MIU/L-ACNC: 0.43 UIU/ML (ref 0.4–4)

## 2024-09-30 PROCEDURE — 99214 OFFICE O/P EST MOD 30 MIN: CPT | Mod: S$GLB,,, | Performed by: INTERNAL MEDICINE

## 2024-09-30 PROCEDURE — 3079F DIAST BP 80-89 MM HG: CPT | Mod: CPTII,S$GLB,, | Performed by: INTERNAL MEDICINE

## 2024-09-30 PROCEDURE — 4010F ACE/ARB THERAPY RXD/TAKEN: CPT | Mod: CPTII,S$GLB,, | Performed by: INTERNAL MEDICINE

## 2024-09-30 PROCEDURE — 80053 COMPREHEN METABOLIC PANEL: CPT | Performed by: INTERNAL MEDICINE

## 2024-09-30 PROCEDURE — 1160F RVW MEDS BY RX/DR IN RCRD: CPT | Mod: CPTII,S$GLB,, | Performed by: INTERNAL MEDICINE

## 2024-09-30 PROCEDURE — 36415 COLL VENOUS BLD VENIPUNCTURE: CPT | Mod: PN | Performed by: STUDENT IN AN ORGANIZED HEALTH CARE EDUCATION/TRAINING PROGRAM

## 2024-09-30 PROCEDURE — 3008F BODY MASS INDEX DOCD: CPT | Mod: CPTII,S$GLB,, | Performed by: INTERNAL MEDICINE

## 2024-09-30 PROCEDURE — 99999 PR PBB SHADOW E&M-EST. PATIENT-LVL IV: CPT | Mod: PBBFAC,,, | Performed by: INTERNAL MEDICINE

## 2024-09-30 PROCEDURE — 3044F HG A1C LEVEL LT 7.0%: CPT | Mod: CPTII,S$GLB,, | Performed by: INTERNAL MEDICINE

## 2024-09-30 PROCEDURE — 3074F SYST BP LT 130 MM HG: CPT | Mod: CPTII,S$GLB,, | Performed by: INTERNAL MEDICINE

## 2024-09-30 PROCEDURE — 1159F MED LIST DOCD IN RCRD: CPT | Mod: CPTII,S$GLB,, | Performed by: INTERNAL MEDICINE

## 2024-09-30 PROCEDURE — 84443 ASSAY THYROID STIM HORMONE: CPT | Performed by: STUDENT IN AN ORGANIZED HEALTH CARE EDUCATION/TRAINING PROGRAM

## 2024-09-30 RX ORDER — ATORVASTATIN CALCIUM 20 MG/1
20 TABLET, FILM COATED ORAL DAILY
Qty: 90 TABLET | Refills: 1 | Status: SHIPPED | OUTPATIENT
Start: 2024-09-30

## 2024-09-30 RX ORDER — VENLAFAXINE HYDROCHLORIDE 37.5 MG/1
CAPSULE, EXTENDED RELEASE ORAL
Qty: 90 CAPSULE | Refills: 2 | Status: SHIPPED | OUTPATIENT
Start: 2024-09-30

## 2024-10-01 DIAGNOSIS — E89.0 POSTOPERATIVE HYPOTHYROIDISM: Primary | ICD-10-CM

## 2024-10-01 NOTE — PROGRESS NOTES
I sent pt a my chart message -  I reviewed your labs.  Your  kidney function has decreased. It does appear you are dehydrated.  How much are your drinking in a day?  No further recommendations at this time.  Dr. ROGERS

## 2024-12-04 DIAGNOSIS — I10 HYPERTENSION, UNSPECIFIED TYPE: ICD-10-CM

## 2024-12-04 RX ORDER — OLMESARTAN MEDOXOMIL 40 MG/1
40 TABLET ORAL
Qty: 90 TABLET | Refills: 3 | Status: SHIPPED | OUTPATIENT
Start: 2024-12-04

## 2024-12-04 NOTE — TELEPHONE ENCOUNTER
Refill Decision Note   Bree Christian  is requesting a refill authorization.  Brief Assessment and Rationale for Refill:  Approve     Medication Therapy Plan:         Comments:     Note composed:2:29 PM 12/04/2024

## 2024-12-04 NOTE — TELEPHONE ENCOUNTER
No care due was identified.  [facility] Embedded Care Due Messages. Reference number: [id number].   12/04/2024 2:09:56 AM CST

## 2025-01-28 DIAGNOSIS — F41.9 ANXIETY: ICD-10-CM

## 2025-01-28 DIAGNOSIS — F32.A DEPRESSIVE DISORDER: ICD-10-CM

## 2025-01-28 RX ORDER — VENLAFAXINE HYDROCHLORIDE 150 MG/1
150 CAPSULE, EXTENDED RELEASE ORAL NIGHTLY
Qty: 90 CAPSULE | Refills: 2 | Status: SHIPPED | OUTPATIENT
Start: 2025-01-28

## 2025-01-28 RX ORDER — VENLAFAXINE HYDROCHLORIDE 37.5 MG/1
CAPSULE, EXTENDED RELEASE ORAL
Qty: 90 CAPSULE | Refills: 2 | Status: SHIPPED | OUTPATIENT
Start: 2025-01-28

## 2025-01-28 NOTE — TELEPHONE ENCOUNTER
Unable to retrieve patient chart and identify care due.  [facility] Embedded Care Due Messages. Reference number: [id number].   1/28/2025 12:11:10 AM CST

## 2025-01-28 NOTE — TELEPHONE ENCOUNTER
Refill Routing Note   Medication(s) are not appropriate for processing by [facility] for the following reason(s):        Drug-disease interaction    [facility] action(s):  Defer        Medication Therapy Plan: Both strengths of Effexor XR 37.5 mg and 150 mg documented and filled recently. Drug-Disease: venlafaxine and CKD (chronic kidney disease), stage III    Pharmacist review requested: Yes     Appointments  past 12m or future 3m with PCP    Date Provider   Last Visit   9/30/2024 Shana Jade MD   Next Visit   4/2/2025 Shana Jade MD   ED visits in past 90 days: 0        Note composed:11:14 AM 01/28/2025

## 2025-01-28 NOTE — TELEPHONE ENCOUNTER
Refill Decision Note   Bree Gonzales  is requesting a refill authorization.  Brief Assessment and Rationale for Refill:  Approve     Medication Therapy Plan:        Alert overridden per protocol: Yes   Pharmacist review requested: Yes   Comments:     Note composed:11:40 AM 01/28/2025

## 2025-03-30 NOTE — PROGRESS NOTES
"[facility] Note    Chief Complaint      Chief Complaint   Patient presents with    Follow-up     6months f/u       History of Present Illness      Bree Gonzales is a 64 y.o.  F with HTN, Hypothyroidism, Depression, Anxiety, FM, OA, and Osteoporosis, Anemia of CKD III, HLD, Vit D def, FM, Peripheral neuropathy, Morbid Obesity, and fam h/o Breast Ca presents to fu chronic issues. Last visit - 9/30/24    Snoring - will check Home sleep study. She scored 7 on ESS.      Peroneal tendonintis - tx with medrol per Ortho - 9/24/24     5th Metatarsal Fx - MRI Foot - 7/19/23 - oblique fracture of her 5th metatarsal that does not go into the joint space. Fu by Ortho.        H/o Melanoma - Fu by Derm. Doing well. Had resection of lesion to Left cheek 6/17/21.       HTN - Pt controlled on Toprol 100 mg/d, and Olmesartan 40 mg/d. Rec inc hydration.  Could be related to her claritin.  Offered to add astelin pt declines. Kidney function has decreased. Rec inc hydration.      Hypothyroidism - Pt on Brand Name Synthroid 125 mcg/d. Pt is s/p Total Thyroidectomy due to thyroid Ca. TSH -0.43 - stable.   Fu by Dr. Tri Haile.       Depression - Pt on Venlafaxine  mg +37.5 mg/d. She recently moved and cares for her elderly Mom. Stable.       Anxiety -  Pt on Venlafaxine  mg +37.5 mg/d. Her mom has dementia. Stable. No change.  "I'm just a worrier. My biggest concern is my son getting  and that's over with".      Anemia of CKD III - Fu by Dr. Sarmiento.        CKD III -  Fu by Dr. Sarmiento.  Pt could not afford Farxiga 10 mg/d because insurance would not cover it. Stable.       H/o Hypercalcemia - Calcium is 9.8 down from 11.2.  PTH - 57  - wnl - 1/9/24. We held her calcium suppl. and we stopped her  hydrochlorothiazide due to hypercalcemia.  I had her inc Toprol to 100 mg daily.  Kidney function remains stable. Continue to work on adequate hydration.      HLD - Pt on atorvastatin 20 mg QHS. She c/o " flatulence since starting this. She does not feel we need to stop the medication or try an alternate medication.  She will alert me if this changes.  Can take prn Gas-X     Vit D def - Pt is on a MVI and Ca+D.      OA - Fu by Rheum, Dr. Grimes. MRI Knee pain - 6/29/23. Fu by Ortho. S/p Euflexxa 8/10/23 and 8/24/23.      Osteoporosis - Pt on Prolia. Last inj was in Dec. Knuckles hurt. Can try Voltaren gel.      FM - Pt on Lyrica 25 mg BID.       Peripheral neuropathy - Foot pain - She c/o numbness and throbbing at night. Wearing socks help.  Cymbalta 20 mg/d no help. Pt is on Lyrica 50mg BID.  Vitamin B12 and folic acid levels were both normal. Unclear etiol.  Can refer to Neurology in future for EMG.  Pt defers for now.  Stable.       Morbid Obesity - BMI -48.27   Up 7 lb since last visit. She has not been exercising as much due to Foot and knee pain.  She tries to cut back on carbs/starches.   Recent normal HA1c. Pt tries to limit carbs.  Rec goal 150 min/wk.       fam h/o Breast Ca - Pt is BRCA neg. Due for MGM in May     H/o Melanoma - Fu by derm regularly.      H/o Malignant Hyperthermia -s/p anesthesia     Lab review:   6/8/22 - BUN/Cr - 32/1.21 GFR 49  3/17/22 - BUN/Cr - 38/1.19 - GFR - 50;  H/H - 11.2/36; iPTH - 81; Fe - 68; TIBC 490 -elev; %Sat - 14%; Vit D - 52  12/6/21 - BUN/Cr - 33/1.10; H/H - 12/37.4.     HCM - Flu - 9/21/23;  Tdap - 6/1/23;  PCV 13 - none;  PVX 23 - 1/25/17;  Shingrix - 8/26/20 and #2 - 11/11/21;  Zostavax - 2/24/15; COVID - 19 Vaccine (Pfiizer)  #1 2/26/21; #2 3/19/21; #3 - 10/26/21; # 4 8/1/22; # 5 6/1/23; MGM - 5/30/24- repeat 1 yr;  DEXA - 11/2020 - +Osteoporosis- had recent [facility] Dr. Grimes - will obtain copy for review.;  PAP - 5/26/23 - neg. ; Hep C Screen - 12/6/21 - neg. ;  HIV Screen - 12/6/21 - neg. ; C-scope - 6/7/22- Polyps, hemorrhoids - repeat 3 yrs;   Prev PCP - Dr. Duckworth;  Rheum - Dr. Grimes; Renal - Dr. Sarmiento; Derm - Dr. Delahoussaye- delgado; Hand Sx -   Gabriella; Ob/GYN - NP - Quiana Sotomayor; Endo - Dr. Vitale; Ophtho - Dr. Kennedy; GI - Dr. Bansal; Well visit - 4/2/25     Patient Care Team:  Shana Jade MD as PCP - General (Internal Medicine)  Praneeth Bansal MD as Consulting Physician (Gastroenterology)  Gatito Grimes Jr., MD (Rheumatology)     Health Maintenance:  Immunization History   Administered Date(s) Administered    COVID-19 Vaccine 09/21/2023    COVID-19, MRNA, LN-S, PF (Pfizer) (Gray Cap) 07/31/2022    COVID-19, MRNA, LN-S, PF (Pfizer) (Purple Cap) 02/26/2021, 03/19/2021, 10/26/2021, 08/01/2022    COVID-19, mRNA, LNP-S, PF (Moderna) Ages 12+ 09/24/2024    COVID-19, mRNA, LNP-S, bivalent booster, PF (Moderna Omicron)12 + YEARS 06/01/2023    Influenza - Quadrivalent 10/03/2019    Influenza - Quadrivalent - MDCK - PF 08/26/2020, 09/13/2022, 09/21/2023    Influenza - Quadrivalent - PF *Preferred* (6 months and older) 10/02/2013, 10/07/2014, 09/30/2015, 10/20/2016, 10/05/2017, 10/03/2018, 10/12/2021    Pneumococcal Polysaccharide - 23 Valent 01/25/2017    Tdap 04/30/2014, 05/02/2014, 06/01/2023    Zoster 02/24/2015, 08/26/2020, 11/11/2021    Zoster Recombinant 08/26/2020, 11/11/2021      Health Maintenance   Topic Date Due    Annual UACr  Never done    Pneumococcal Vaccines (Age 50+) (2 of 2 - PCV) 01/25/2018    COVID-19 Vaccine (8 - 2024-25 season) 11/19/2024    Mammogram  05/30/2025    Colorectal Cancer Screening  06/07/2025    Hemoglobin A1c (Diabetic Prevention Screening)  06/06/2027    Cervical Cancer Screening  05/26/2028    Lipid Panel  06/06/2029    TETANUS VACCINE  06/01/2033    Hepatitis C Screening  Completed    Shingles Vaccine  Completed    Influenza Vaccine  Completed    HIV Screening  Completed    RSV Vaccine (Age 60+ and Pregnant patients)  Completed        Past Medical History:  Past Medical History:   Diagnosis Date    BRCA1 negative     BRCA2 negative     CKD (chronic kidney disease), stage III 10/12/2021    Family history of  breast cancer     Fibromyalgia     Hypertension     Hypothyroidism     Malignant hyperthermia due to anesthesia     Malignant melanoma of skin, unspecified     Osteoporosis     Skin cancer 10/12/2021    Thyroid cancer 10/12/2021       Past Surgical History:   has a past surgical history that includes Cholecystectomy (05/1981); Total thyroidectomy (03/2002); Skin cancer excision; Dilation and curettage of uterus; Trigger finger release (Right, 11/2020); Cataract extraction, bilateral; and Eye surgery (4/2017).    Family History:  family history includes Arthritis in her mother; Breast cancer in her maternal grandmother; Breast cancer (age of onset: 51) in her sister; Breast cancer (age of onset: 74) in her mother; Cancer in her mother and sister; Dementia in her mother; Hearing loss in her father; Melanoma in her father; Mental illness in her mother.     Social History:  Social History[1]    Review of Systems   Constitutional:  Negative for chills, fever and night sweats.   HENT:  Negative for hearing loss.    Eyes:  Negative for visual disturbance.        Wears glasses   Respiratory:  Negative for chest tightness and shortness of breath.    Cardiovascular:  Negative for chest pain, palpitations and leg swelling.   Gastrointestinal:  Positive for diarrhea. Negative for abdominal pain, blood in stool, constipation, nausea and vomiting.   Endocrine: Negative for cold intolerance, heat intolerance and polydipsia.   Genitourinary:  Negative for bladder incontinence, dysuria, frequency and hematuria.   Musculoskeletal:  Positive for arthralgias and myalgias.        Left knee and all over. Hands.    Neurological:  Negative for dizziness and headaches.   Psychiatric/Behavioral:  Positive for depressed mood. Negative for suicidal ideas. The patient is nervous/anxious.         Stable. Mom has dementia.  Thinks things will settle down since Son;s recent weddign is behind her        Medications:  Current Medications[2]  "    Allergies:  Review of patient's allergies indicates:   Allergen Reactions    Nsaids (non-steroidal anti-inflammatory drug)     Neosporin (neomycin-polymyx) Rash       Physical Exam      Vital Signs  Temp: 98.5 °F (36.9 °C)  Temp Source: Oral  Pulse: 88  Resp: 17  SpO2: 97 %  BP: 128/84  BP Location: Left arm  Patient Position: Sitting  Pain Score: 0-No pain  Height and Weight  Height: 5' 2" (157.5 cm)  Weight: 119.7 kg (263 lb 14.3 oz)  BSA (Calculated - sq m): 2.29 sq meters  BMI (Calculated): 48.3  Weight in (lb) to have BMI = 25: 136.4      Patient Position: Sitting        4/2/2025   EPWORTH SLEEPINESS SCALE   Sitting and reading 1   Watching TV 3   Sitting, inactive in a public place (e.g. a theatre or a meeting) 0   As a passenger in a car for an hour without a break 0   Lying down to rest in the afternoon when circumstances permit 3   Sitting and talking to someone 0   Sitting quietly after a lunch without alcohol 0   In a car, while stopped for a few minutes in traffic 0   Total score 7       Physical Exam  Vitals reviewed.   Constitutional:       General: She is not in acute distress.     Appearance: Normal appearance. She is not ill-appearing, toxic-appearing or diaphoretic.   HENT:      Head: Normocephalic and atraumatic.      Right Ear: Tympanic membrane normal.      Left Ear: Tympanic membrane normal.      Mouth/Throat:      Mouth: Mucous membranes are dry.   Eyes:      Extraocular Movements: Extraocular movements intact.      Conjunctiva/sclera: Conjunctivae normal.      Pupils: Pupils are equal, round, and reactive to light.   Neck:      Vascular: No carotid bruit.   Cardiovascular:      Rate and Rhythm: Normal rate and regular rhythm.      Pulses: Normal pulses.      Heart sounds: Normal heart sounds. No murmur heard.  Pulmonary:      Effort: No respiratory distress.      Breath sounds: Normal breath sounds. No wheezing.   Abdominal:      General: Bowel sounds are normal. There is no distension. "      Palpations: Abdomen is soft.      Tenderness: There is no abdominal tenderness. There is no guarding or rebound.   Skin:     Comments: Facial flush   Neurological:      General: No focal deficit present.      Mental Status: She is alert and oriented to person, place, and time.   Psychiatric:         Mood and Affect: Mood normal.         Behavior: Behavior normal.          Laboratory:  CBC:  Recent Labs   Lab 11/14/22  1034 06/06/24  0922   WBC 7.52 8.0   RBC 4.33 4.32   Hemoglobin 12.4 13.3   Hematocrit 38.7 40.9   Platelets 275 285   MCV 89 94.7   MCH 28.6 30.8   MCHC 32.0 32.5       CMP:  Recent Labs   Lab 09/11/23  1255 09/28/23  0854 06/06/24  0922 09/30/24  1123   Glucose 107   < > 89 76   Calcium 11.2 H   < > 9.8 9.8   Albumin 4.1  --  4.2 3.7   Total Protein 6.5  --  6.7 6.5   Sodium 137   < > 139 138   Potassium 3.8   < > 4.8 4.2   CO2 31   < > 32 32 H   Chloride 96 L   < > 101 99   BUN 36 H   < > 22 44 H   Creatinine 1.56 H   < > 1.29 H 1.3   Alkaline Phosphatase  --   --   --  87   ALT 17  --  19 19   AST 17  --  19 15   Total Bilirubin 0.8  --  0.6 0.5    < > = values in this interval not displayed.          LIPIDS:  Recent Labs   Lab 08/25/22  0855 06/01/23  0854 09/11/23  1255 06/06/24  0922 06/24/24  0902 08/06/24  0929 09/30/24  1123   TSH  --  0.04 L   < >  --  0.03 L 0.16 L 0.431   HDL 60 64  --  59  --   --   --    Cholesterol 185 181  --  203 H  --   --   --    Triglycerides 178 H 199 H  --  189 H  --   --   --    LDL Cholesterol 98 87  --  114 H  --   --   --    HDL/Cholesterol Ratio 3.1 2.8  --  3.4  --   --   --    Non HDL Chol. (LDL+VLDL) 125 117  --  144 H  --   --   --     < > = values in this interval not displayed.       TSH:  Recent Labs   Lab 06/24/24  0902 08/06/24  0929 09/30/24  1123   TSH 0.03 L 0.16 L 0.431       A1C:  Recent Labs   Lab 06/01/23  0854 06/06/24  0922   Hemoglobin A1C 4.9 4.9        Other:   Recent Labs   Lab 09/11/23  1255   Vitamin B-12 717            Assessment/Plan     Bree Gonzales is a 64 y.o.female with:    Normal physical exam, routine   - Performed today.      Hypertension, unspecified type  -     metoprolol succinate (TOPROL-XL) 100 MG 24 hr tablet; Take 1 tablet (100 mg total) by mouth once daily.  Dispense: 90 tablet; Refill: 3  -     Comprehensive Metabolic Panel; Future; Expected date: 04/02/2025  -     CBC Auto Differential; Future; Expected date: 04/02/2025  - Controlled.  Cont current.     Hypercholesterolemia  -     atorvastatin (LIPITOR) 20 MG tablet; Take 1 tablet (20 mg total) by mouth once daily.  Dispense: 90 tablet; Refill: 1  -     Comprehensive Metabolic Panel; Future; Expected date: 04/02/2025  -     Lipid Panel; Future; Expected date: 04/02/2025  - Stable.  Cont current regimen.    Hypersomnolence  -     Home Sleep Study; Future    Postoperative hypothyroidism  - Stable.  Cont current regimen.    Depressive disorder  - Stable.  Cont current regimen.    Anxiety  - Stable.  Cont current regimen.    Fibromyalgia  - Stable.  Cont current regimen.    Stage 3a chronic kidney disease  - rec adeq hydration. Avoid NSAIDS. Fu by renal.     Osteoporosis, unspecified osteoporosis type, unspecified pathological fracture presence  - Stable.  Cont current regimen.    Morbid obesity with BMI of 45.0-49.9, adult  - Rec diet and exercise as discussed for wt loss.      Chronic conditions status updated as per HPI.  Other than changes above, cont current medications and maintain follow up with specialists.   Follow up in about 6 months (around 10/2/2025) for fu chronic issues or sooner if needed.      Shana Jade MD  [facility]                       [1]   Social History  Tobacco Use    Smoking status: Never    Smokeless tobacco: Never   Substance Use Topics    Alcohol use: Never    Drug use: Never   [2]   Current Outpatient Medications:     cetirizine 10 mg Cap, Take by mouth., Disp: , Rfl:     levothyroxine (SYNTHROID) 125 MCG tablet,  Take 1 tablet (125 mcg total) by mouth before breakfast., Disp: 30 tablet, Rfl: 11    olmesartan (BENICAR) 40 MG tablet, TAKE 1 TABLET BY MOUTH EVERY DAY, Disp: 90 tablet, Rfl: 3    pregabalin (LYRICA) 50 MG capsule, Take 50 mg by mouth 2 (two) times a day., Disp: , Rfl:     venlafaxine (EFFEXOR-XR) 150 MG Cp24, TAKE 1 CAPSULE (150 MG TOTAL) BY MOUTH EVERY EVENING. WITH 37.5MG CAPSULE. TOTAL DAILY DOSE 187.5MG, Disp: 90 capsule, Rfl: 2    venlafaxine (EFFEXOR-XR) 37.5 MG 24 hr capsule, TAKE 1 CAPSULE BY MOUTH EVERY DAY WITH 150 MG FOR A TOTAL DAILY DOSE .5, Disp: 90 capsule, Rfl: 2    atorvastatin (LIPITOR) 20 MG tablet, Take 1 tablet (20 mg total) by mouth once daily., Disp: 90 tablet, Rfl: 1    denosumab (PROLIA) 60 mg/mL Syrg, Inject 1 mL (60 mg total) into the skin every 6 (six) months., Disp: , Rfl:     [START ON 5/1/2025] metoprolol succinate (TOPROL-XL) 100 MG 24 hr tablet, Take 1 tablet (100 mg total) by mouth once daily., Disp: 90 tablet, Rfl: 3

## 2025-04-02 ENCOUNTER — OFFICE VISIT (OUTPATIENT)
Dept: PRIMARY CARE CLINIC | Facility: CLINIC | Age: 65
End: 2025-04-02
Payer: COMMERCIAL

## 2025-04-02 ENCOUNTER — LAB VISIT (OUTPATIENT)
Dept: LAB | Facility: HOSPITAL | Age: 65
End: 2025-04-02
Attending: INTERNAL MEDICINE
Payer: COMMERCIAL

## 2025-04-02 ENCOUNTER — RESULTS FOLLOW-UP (OUTPATIENT)
Dept: PRIMARY CARE CLINIC | Facility: CLINIC | Age: 65
End: 2025-04-02

## 2025-04-02 ENCOUNTER — PATIENT MESSAGE (OUTPATIENT)
Dept: PRIMARY CARE CLINIC | Facility: CLINIC | Age: 65
End: 2025-04-02

## 2025-04-02 VITALS
RESPIRATION RATE: 17 BRPM | BODY MASS INDEX: 48.56 KG/M2 | HEIGHT: 62 IN | HEART RATE: 88 BPM | WEIGHT: 263.88 LBS | TEMPERATURE: 99 F | DIASTOLIC BLOOD PRESSURE: 84 MMHG | OXYGEN SATURATION: 97 % | SYSTOLIC BLOOD PRESSURE: 128 MMHG

## 2025-04-02 DIAGNOSIS — E78.00 HYPERCHOLESTEROLEMIA: ICD-10-CM

## 2025-04-02 DIAGNOSIS — F32.A DEPRESSIVE DISORDER: ICD-10-CM

## 2025-04-02 DIAGNOSIS — N18.31 STAGE 3A CHRONIC KIDNEY DISEASE: ICD-10-CM

## 2025-04-02 DIAGNOSIS — I10 HYPERTENSION, UNSPECIFIED TYPE: ICD-10-CM

## 2025-04-02 DIAGNOSIS — E89.0 POSTOPERATIVE HYPOTHYROIDISM: ICD-10-CM

## 2025-04-02 DIAGNOSIS — E66.01 MORBID OBESITY WITH BMI OF 45.0-49.9, ADULT: ICD-10-CM

## 2025-04-02 DIAGNOSIS — G47.10 HYPERSOMNOLENCE: ICD-10-CM

## 2025-04-02 DIAGNOSIS — Z00.00 NORMAL PHYSICAL EXAM, ROUTINE: Primary | ICD-10-CM

## 2025-04-02 DIAGNOSIS — F41.9 ANXIETY: ICD-10-CM

## 2025-04-02 DIAGNOSIS — M81.0 OSTEOPOROSIS, UNSPECIFIED OSTEOPOROSIS TYPE, UNSPECIFIED PATHOLOGICAL FRACTURE PRESENCE: ICD-10-CM

## 2025-04-02 DIAGNOSIS — M79.7 FIBROMYALGIA: ICD-10-CM

## 2025-04-02 DIAGNOSIS — G47.33 SEVERE OBSTRUCTIVE SLEEP APNEA: Primary | ICD-10-CM

## 2025-04-02 LAB
ABSOLUTE EOSINOPHIL (OHS): 0.21 K/UL
ABSOLUTE MONOCYTE (OHS): 0.64 K/UL (ref 0.3–1)
ABSOLUTE NEUTROPHIL COUNT (OHS): 5.69 K/UL (ref 1.8–7.7)
ALBUMIN SERPL BCP-MCNC: 3.6 G/DL (ref 3.5–5.2)
ALP SERPL-CCNC: 85 UNIT/L (ref 40–150)
ALT SERPL W/O P-5'-P-CCNC: 18 UNIT/L (ref 10–44)
ANION GAP (OHS): 5 MMOL/L (ref 8–16)
AST SERPL-CCNC: 19 UNIT/L (ref 11–45)
BASOPHILS # BLD AUTO: 0.05 K/UL
BASOPHILS NFR BLD AUTO: 0.7 %
BILIRUB SERPL-MCNC: 0.5 MG/DL (ref 0.1–1)
BUN SERPL-MCNC: 26 MG/DL (ref 8–23)
CALCIUM SERPL-MCNC: 9.7 MG/DL (ref 8.7–10.5)
CHLORIDE SERPL-SCNC: 102 MMOL/L (ref 95–110)
CHOLEST SERPL-MCNC: 180 MG/DL (ref 120–199)
CHOLEST/HDLC SERPL: 3.8 {RATIO} (ref 2–5)
CO2 SERPL-SCNC: 30 MMOL/L (ref 23–29)
CREAT SERPL-MCNC: 1.1 MG/DL (ref 0.5–1.4)
ERYTHROCYTE [DISTWIDTH] IN BLOOD BY AUTOMATED COUNT: 14.5 % (ref 11.5–14.5)
GFR SERPLBLD CREATININE-BSD FMLA CKD-EPI: 57 ML/MIN/1.73/M2
GLUCOSE SERPL-MCNC: 96 MG/DL (ref 70–110)
HCT VFR BLD AUTO: 41.5 % (ref 37–48.5)
HDLC SERPL-MCNC: 47 MG/DL (ref 40–75)
HDLC SERPL: 26.1 % (ref 20–50)
HGB BLD-MCNC: 13.1 GM/DL (ref 12–16)
IMM GRANULOCYTES # BLD AUTO: 0.03 K/UL (ref 0–0.04)
IMM GRANULOCYTES NFR BLD AUTO: 0.4 % (ref 0–0.5)
LDLC SERPL CALC-MCNC: 85.6 MG/DL (ref 63–159)
LYMPHOCYTES # BLD AUTO: 1.01 K/UL (ref 1–4.8)
MCH RBC QN AUTO: 30.9 PG (ref 27–31)
MCHC RBC AUTO-ENTMCNC: 31.6 G/DL (ref 32–36)
MCV RBC AUTO: 98 FL (ref 82–98)
NONHDLC SERPL-MCNC: 133 MG/DL
NUCLEATED RBC (/100WBC) (OHS): 0 /100 WBC
PLATELET # BLD AUTO: 251 K/UL (ref 150–450)
PMV BLD AUTO: 9.8 FL (ref 9.2–12.9)
POTASSIUM SERPL-SCNC: 3.9 MMOL/L (ref 3.5–5.1)
PROT SERPL-MCNC: 6.8 GM/DL (ref 6–8.4)
RBC # BLD AUTO: 4.24 M/UL (ref 4–5.4)
RELATIVE EOSINOPHIL (OHS): 2.8 %
RELATIVE LYMPHOCYTE (OHS): 13.2 % (ref 18–48)
RELATIVE MONOCYTE (OHS): 8.4 % (ref 4–15)
RELATIVE NEUTROPHIL (OHS): 74.5 % (ref 38–73)
SODIUM SERPL-SCNC: 137 MMOL/L (ref 136–145)
TRIGL SERPL-MCNC: 237 MG/DL (ref 30–150)
WBC # BLD AUTO: 7.63 K/UL (ref 3.9–12.7)

## 2025-04-02 PROCEDURE — 1159F MED LIST DOCD IN RCRD: CPT | Mod: CPTII,S$GLB,, | Performed by: INTERNAL MEDICINE

## 2025-04-02 PROCEDURE — 85025 COMPLETE CBC W/AUTO DIFF WBC: CPT

## 2025-04-02 PROCEDURE — 80053 COMPREHEN METABOLIC PANEL: CPT

## 2025-04-02 PROCEDURE — 80061 LIPID PANEL: CPT

## 2025-04-02 PROCEDURE — 36415 COLL VENOUS BLD VENIPUNCTURE: CPT | Mod: PN

## 2025-04-02 PROCEDURE — 3008F BODY MASS INDEX DOCD: CPT | Mod: CPTII,S$GLB,, | Performed by: INTERNAL MEDICINE

## 2025-04-02 PROCEDURE — 1160F RVW MEDS BY RX/DR IN RCRD: CPT | Mod: CPTII,S$GLB,, | Performed by: INTERNAL MEDICINE

## 2025-04-02 PROCEDURE — 3074F SYST BP LT 130 MM HG: CPT | Mod: CPTII,S$GLB,, | Performed by: INTERNAL MEDICINE

## 2025-04-02 PROCEDURE — 99999 PR PBB SHADOW E&M-EST. PATIENT-LVL IV: CPT | Mod: PBBFAC,,, | Performed by: INTERNAL MEDICINE

## 2025-04-02 PROCEDURE — 4010F ACE/ARB THERAPY RXD/TAKEN: CPT | Mod: CPTII,S$GLB,, | Performed by: INTERNAL MEDICINE

## 2025-04-02 PROCEDURE — 99396 PREV VISIT EST AGE 40-64: CPT | Mod: S$GLB,,, | Performed by: INTERNAL MEDICINE

## 2025-04-02 PROCEDURE — 3079F DIAST BP 80-89 MM HG: CPT | Mod: CPTII,S$GLB,, | Performed by: INTERNAL MEDICINE

## 2025-04-02 RX ORDER — ATORVASTATIN CALCIUM 20 MG/1
20 TABLET, FILM COATED ORAL DAILY
Qty: 90 TABLET | Refills: 1 | Status: SHIPPED | OUTPATIENT
Start: 2025-04-02

## 2025-04-02 RX ORDER — METOPROLOL SUCCINATE 100 MG/1
100 TABLET, EXTENDED RELEASE ORAL DAILY
Qty: 90 TABLET | Refills: 3 | Status: SHIPPED | OUTPATIENT
Start: 2025-05-01

## 2025-04-07 RX ORDER — ATORVASTATIN CALCIUM 40 MG/1
40 TABLET, FILM COATED ORAL NIGHTLY
Qty: 90 TABLET | Refills: 1 | Status: SHIPPED | OUTPATIENT
Start: 2025-04-07

## 2025-04-07 NOTE — TELEPHONE ENCOUNTER
Ok, let her know I sent her a new Rx for the 40 mg tabs.  Let me know if she has any issues with this dose increase.   Dr. ROGERS

## 2025-04-08 ENCOUNTER — HOSPITAL ENCOUNTER (OUTPATIENT)
Dept: SLEEP MEDICINE | Facility: OTHER | Age: 65
Discharge: HOME | End: 2025-04-08
Attending: INTERNAL MEDICINE
Payer: COMMERCIAL

## 2025-04-08 DIAGNOSIS — G47.10 HYPERSOMNOLENCE: ICD-10-CM

## 2025-04-08 PROCEDURE — 95800 SLP STDY UNATTENDED: CPT

## 2025-04-09 PROBLEM — G47.10 HYPERSOMNOLENCE: Status: ACTIVE | Noted: 2025-04-09

## 2025-04-10 PROCEDURE — 95800 SLP STDY UNATTENDED: CPT | Mod: 26,,, | Performed by: INTERNAL MEDICINE

## 2025-04-28 ENCOUNTER — PATIENT MESSAGE (OUTPATIENT)
Dept: PRIMARY CARE CLINIC | Facility: CLINIC | Age: 65
End: 2025-04-28
Payer: COMMERCIAL

## 2025-04-28 DIAGNOSIS — G47.33 SEVERE OBSTRUCTIVE SLEEP APNEA: Primary | ICD-10-CM

## 2025-04-28 NOTE — TELEPHONE ENCOUNTER
Well that is unfortunate. I see where I asked the order to be pended. Thanks, Shayy. I will forward to my staff so this gets done.  Dr. ROGERS

## 2025-04-28 NOTE — TELEPHONE ENCOUNTER
LOV with Shana Jade MD , 4/2/2025  It does not appear that pts APAP order was signed 4/15/25. Please sign pended order

## 2025-04-29 ENCOUNTER — PATIENT MESSAGE (OUTPATIENT)
Dept: PRIMARY CARE CLINIC | Facility: CLINIC | Age: 65
End: 2025-04-29
Payer: COMMERCIAL

## 2025-05-13 ENCOUNTER — PATIENT MESSAGE (OUTPATIENT)
Dept: PRIMARY CARE CLINIC | Facility: CLINIC | Age: 65
End: 2025-05-13
Payer: COMMERCIAL

## 2025-05-15 ENCOUNTER — OFFICE VISIT (OUTPATIENT)
Dept: SPORTS MEDICINE | Facility: CLINIC | Age: 65
End: 2025-05-15
Payer: COMMERCIAL

## 2025-05-15 ENCOUNTER — HOSPITAL ENCOUNTER (OUTPATIENT)
Dept: RADIOLOGY | Facility: HOSPITAL | Age: 65
Discharge: HOME | End: 2025-05-15
Attending: PHYSICIAN ASSISTANT
Payer: COMMERCIAL

## 2025-05-15 VITALS
DIASTOLIC BLOOD PRESSURE: 98 MMHG | BODY MASS INDEX: 48.21 KG/M2 | SYSTOLIC BLOOD PRESSURE: 161 MMHG | HEIGHT: 62 IN | WEIGHT: 262 LBS | HEART RATE: 81 BPM

## 2025-05-15 DIAGNOSIS — M25.562 CHRONIC PAIN OF LEFT KNEE: ICD-10-CM

## 2025-05-15 DIAGNOSIS — G89.29 CHRONIC PAIN OF LEFT KNEE: ICD-10-CM

## 2025-05-15 DIAGNOSIS — M17.12 PRIMARY OSTEOARTHRITIS OF LEFT KNEE: ICD-10-CM

## 2025-05-15 DIAGNOSIS — M25.562 ACUTE PAIN OF LEFT KNEE: Primary | ICD-10-CM

## 2025-05-15 DIAGNOSIS — M23.204 OLD COMPLEX TEAR OF MEDIAL MENISCUS OF LEFT KNEE: ICD-10-CM

## 2025-05-15 PROCEDURE — 73564 X-RAY EXAM KNEE 4 OR MORE: CPT | Mod: TC,LT

## 2025-05-15 PROCEDURE — 99999 PR PBB SHADOW E&M-EST. PATIENT-LVL III: CPT | Mod: PBBFAC,,, | Performed by: PHYSICIAN ASSISTANT

## 2025-05-15 RX ORDER — TRAMADOL HYDROCHLORIDE 50 MG/1
50 TABLET, FILM COATED ORAL EVERY 8 HOURS PRN
Qty: 20 TABLET | Refills: 0 | Status: SHIPPED | OUTPATIENT
Start: 2025-05-15

## 2025-05-15 RX ORDER — TRIAMCINOLONE ACETONIDE 40 MG/ML
40 INJECTION, SUSPENSION INTRA-ARTICULAR; INTRAMUSCULAR
Status: DISCONTINUED | OUTPATIENT
Start: 2025-05-15 | End: 2025-05-15 | Stop reason: HOSPADM

## 2025-05-15 RX ADMIN — TRIAMCINOLONE ACETONIDE 40 MG: 40 INJECTION, SUSPENSION INTRA-ARTICULAR; INTRAMUSCULAR at 02:05

## 2025-05-15 NOTE — PROCEDURES
Large Joint Aspiration/Injection: L knee    Date/Time: 5/15/2025 2:30 PM    Performed by: Chao Castillo PA-C  Authorized by: Chao Castillo PA-C    Consent Done?:  Yes (Verbal)  Indications:  Pain and arthritis  Site marked: the procedure site was marked    Timeout: prior to procedure the correct patient, procedure, and site was verified    Prep: patient was prepped and draped in usual sterile fashion    Local anesthesia used?: No      Details:  Needle Size:  22 G  Ultrasonic Guidance for needle placement?: No    Approach:  Anterolateral  Location:  Knee  Site:  L knee  Medications:  40 mg triamcinolone acetonide 40 mg/mL  Patient tolerance:  Patient tolerated the procedure well with no immediate complications    Injection Procedure  A time out was performed, including verification of patient ID, procedure, site and side, availability of information and equipment, review of safety issues, and agreement with consent, the procedure site was marked.    After time out was performed, the patient was prepped aseptically with povidone-iodine swabsticks. A diagnostic and therapeutic injection of 1:3cc Kenalog/Marcaine was given under sterile technique using a 22g x 1.5 needle from the anterolateral  aspect of the left Knee Joint in the sitting position.      Bree Gonzales had no adverse reactions to the medication. Pain decreased. She was instructed to apply ice to the joint for 20 minutes and avoid strenuous activities for 24-36 hours following the injection. She was warned of possible blood sugar and/or blood pressure changes during that time. Following that time, she can resume regular activities.    She was reminded to call the clinic immediately for any adverse side effects as explained in clinic today.

## 2025-05-15 NOTE — PROGRESS NOTES
CC: Left knee pain    HPI:  Bree presents with knee pain and swelling that started about a week ago. She reports sudden onset on the top of her knee, describing it as severe pain compared to the previous burning sensation. Pain severity has fluctuated, at one point affecting her ability to walk straight, but is currently tolerable. However, she notes it has been progressively worsening. The pain is localized to a specific area, different from previous pain locations.    She reports difficulty walking and knee instability, especially initially. She denies any specific traumatic event but suggests it might be related to wearing rubber shoes that can snag on the floor or unexpected movements, such as tripping over her dog. She has experienced unexpected turns or movements that may have contributed to the pain, but has not fallen.    For pain management, she uses ice therapy at night and takes Tylenol, which provide some relief. Initially, the knee felt swollen and hot. She is currently taking Gabapentin twice daily for numbness in her toes. She mentions CKD as a reason for not taking anti-inflammatory medications.    She denies any clicking, catching, or grinding in the knee.    PREVIOUS TREATMENTS:  Bree has been using ice on her knee at night, which helps alleviate pain. She has been taking Tylenol for pain management, which is particularly effective at night, aiding her in falling asleep. Previously, the patient received gel injections in her knee. Prior to the gel injections, she had a cortisone injection in the same knee.        She has known osteoarthritis of her knee and degenerative tear of the medial meniscus confirmed on MRI which has been treated conservatively thus far.       - mechanical symptoms, + instability    Is affecting ADLs.  Pain is 7/10 at it's worst.    REVIEW OF SYSTEMS:  Constitution: Negative. Negative for chills, fever and night sweats.   HENT: Negative for congestion and headaches.    Eyes:  Negative for blurred vision, left vision loss and right vision loss.   Cardiovascular: Negative for chest pain and syncope.   Respiratory: Negative for cough and shortness of breath.    Endocrine: Negative for polydipsia, polyphagia and polyuria.   Hematologic/Lymphatic: Negative for bleeding problem. Does not bruise/bleed easily.   Skin: Negative for dry skin, itching and rash.   Musculoskeletal: Negative for falls. Positive for left knee pain and  muscle weakness.   Gastrointestinal: Negative for abdominal pain and bowel incontinence.   Genitourinary: Negative for bladder incontinence and nocturia.   Neurological: Negative for disturbances in coordination, loss of balance and seizures.   Psychiatric/Behavioral: Negative for depression. The patient does not have insomnia.    Allergic/Immunologic: Negative for hives and persistent infections.     PAST MEDICAL HISTORY:    Past Medical History:   Diagnosis Date    BRCA1 negative     BRCA2 negative     CKD (chronic kidney disease), stage III 10/12/2021    Family history of breast cancer     Fibromyalgia     Hypertension     Hypothyroidism     Malignant hyperthermia due to anesthesia     Malignant melanoma of skin, unspecified     Osteoporosis     Skin cancer 10/12/2021    Thyroid cancer 10/12/2021       PAST SURGICAL HISTORY:   Past Surgical History:   Procedure Laterality Date    CATARACT EXTRACTION, BILATERAL      CHOLECYSTECTOMY  05/1981    DILATION AND CURETTAGE OF UTERUS      EYE SURGERY  4/2017    Both eye cataract surgery    SKIN CANCER EXCISION      TOTAL THYROIDECTOMY  03/2002    TRIGGER FINGER RELEASE Right 11/2020       FAMILY HISTORY:   Family History   Problem Relation Name Age of Onset    Breast cancer Mother Yvonne Josue 74    Dementia Mother Yvonne Salas     Arthritis Mother Yvonne Salas     Cancer Mother Yvonne Salas     Mental illness Mother Yvonne Salas     Hearing loss Father Krish Josue     Melanoma Father Krish Josue      Breast cancer Sister Paulina Morgan 51    Cancer Sister Paulina Morgan     Breast cancer Maternal Grandmother      Colon cancer Neg Hx      Ovarian cancer Neg Hx         SOCIAL HISTORY:   Social History     Socioeconomic History    Marital status:    Tobacco Use    Smoking status: Never    Smokeless tobacco: Never   Substance and Sexual Activity    Alcohol use: Never    Drug use: Never    Sexual activity: Not Currently     Partners: Male     Birth control/protection: Post-menopausal     Comment:      Social Drivers of Health     Financial Resource Strain: Low Risk  (5/12/2025)    Overall Financial Resource Strain (CARDIA)     Difficulty of Paying Living Expenses: Not hard at all   Food Insecurity: No Food Insecurity (5/12/2025)    Hunger Vital Sign     Worried About Running Out of Food in the Last Year: Never true     Ran Out of Food in the Last Year: Never true   Transportation Needs: No Transportation Needs (5/12/2025)    PRAPARE - Transportation     Lack of Transportation (Medical): No     Lack of Transportation (Non-Medical): No   Physical Activity: Inactive (5/12/2025)    Exercise Vital Sign     Days of Exercise per Week: 0 days     Minutes of Exercise per Session: 0 min   Stress: Stress Concern Present (5/12/2025)    [employer] [place] [employer] - Occupational Stress Questionnaire     Feeling of Stress : To some extent   Housing Stability: Low Risk  (5/12/2025)    Housing Stability Vital Sign     Unable to Pay for Housing in the Last Year: No     Number of Times Moved in the Last Year: 0     Homeless in the Last Year: No       MEDICATIONS:     Current Outpatient Medications:     atorvastatin (LIPITOR) 40 MG tablet, Take 1 tablet (40 mg total) by mouth every evening., Disp: 90 tablet, Rfl: 1    cetirizine 10 mg Cap, Take by mouth., Disp: , Rfl:     levothyroxine (SYNTHROID) 125 MCG tablet, Take 1 tablet (125 mcg total) by mouth before breakfast., Disp: 30 tablet, Rfl: 11    metoprolol  "succinate (TOPROL-XL) 100 MG 24 hr tablet, Take 1 tablet (100 mg total) by mouth once daily., Disp: 90 tablet, Rfl: 3    olmesartan (BENICAR) 40 MG tablet, TAKE 1 TABLET BY MOUTH EVERY DAY, Disp: 90 tablet, Rfl: 3    pregabalin (LYRICA) 50 MG capsule, Take 50 mg by mouth 2 (two) times a day., Disp: , Rfl:     venlafaxine (EFFEXOR-XR) 150 MG Cp24, TAKE 1 CAPSULE (150 MG TOTAL) BY MOUTH EVERY EVENING. WITH 37.5MG CAPSULE. TOTAL DAILY DOSE 187.5MG, Disp: 90 capsule, Rfl: 2    venlafaxine (EFFEXOR-XR) 37.5 MG 24 hr capsule, TAKE 1 CAPSULE BY MOUTH EVERY DAY WITH 150 MG FOR A TOTAL DAILY DOSE .5, Disp: 90 capsule, Rfl: 2    denosumab (PROLIA) 60 mg/mL Syrg, Inject 1 mL (60 mg total) into the skin every 6 (six) months., Disp: , Rfl:     traMADoL (ULTRAM) 50 mg tablet, Take 1 tablet (50 mg total) by mouth every 8 (eight) hours as needed for Pain., Disp: 20 tablet, Rfl: 0    ALLERGIES:   Review of patient's allergies indicates:   Allergen Reactions    Nsaids (non-steroidal anti-inflammatory drug)     Neosporin (neomycin-polymyx) Rash       VITAL SIGNS:   BP (!) 161/98   Pulse 81   Ht 5' 2" (1.575 m)   Wt 118.8 kg (261 lb 15.9 oz)   BMI 47.92 kg/m²      PHYSICAL EXAMINATION  General:  The patient is alert and oriented x 3.  Mood is pleasant.  Observation of ears, eyes and nose reveal no gross abnormalities.  No labored breathing observed.    LEFT KNEE EXAMINATION     OBSERVATION / INSPECTION   Gait:   antalgic   Alignment:  Neutral   Scars:   None   Muscle atrophy: Mild  Effusion:  Difficult to assess due to patient's body habitus  Warmth:  None   Discoloration:   none     TENDERNESS / CREPITUS (T / C):          T / C      T / C   Patella   - / -   Lateral joint line   + / -   Peripatellar medial  -  Medial joint line    + / -   Peripatellar lateral -  Medial plica   - / -   Patellar tendon -   Popliteal fossa   - / -   Quad tendon   -   Gastrocnemius   -   Prepatellar Bursa - / -   Quadricep   -   Tibial " tubercle  -  Thigh/hamstring  -   Pes anserine/HS -  Fibula    -   ITB   - / -  Tibia     -   Tib/fib joint  - / -  LCL    -     MFC   - / -   MCL: Proximal  -    LFC   - / -    Distal   -          ROM: (* = pain)  PASSIVE   ACTIVE      Left :   5 / 0 / 120*   5 / 0 / 120*     Right :    5  0  120   5 / 0 / 120    Patellofemoral examination:  See above noted areas of tenderness.   Patella position    Subluxation / dislocation: Centered           Sup. / Inf;   Normal   Crepitus (PF):    Absent   Patellar Mobility:       Medial-lateral:   Normal    Superior-inferior:  Normal    Inferior tilt   Normal    Patellar tendon:  Normal   Lateral tilt:    Normal   J-sign:     None   Patellofemoral grind:   No pain       MENISCAL SIGNS:     Pain on terminal extension:  -  Pain on terminal flexion:  +  Mary Ellens maneuver:  + (for pain)  Squat     deferred    LIGAMENT EXAMINATION:  ACL / Lachman:  normal (-1 to 2mm)    PCL-Post.  drawer: normal 0 to 2mm  MCL- Valgus:  normal 0 to 2mm  LCL- Varus:  normal 0 to 2mm  Pivot shift: normal (Equal)   Dial Test: difference c/w other side   At 30° flexion: normal (< 5°)    At 90° flexion: normal (< 5°)   Reverse Pivot Shift:   normal (Equal)     STRENGTH: (* = with pain) PAINFUL SIDE   Quadricep   4/5   Hamstrin/5    EXTREMITY NEURO-VASCULAR EXAMINATION:   Sensation:  Grossly intact to light touch all dermatomal regions.   Motor Function:  Fully intact motor function at hip, knee, foot and ankle    DTRs;  quadriceps and  achilles 2+.  No clonus and downgoing Babinski.    Vascular status:  DP and PT pulses 2+, brisk capillary refill, symmetric.     OTHER FINDINGS:  N/A      MRI left knee (2023):  MRI images of the left knee ordered and independently interpreted by me show:  Complex tear of the medial meniscus with extrusion into the superior medial gutter.  Edema of the MCL which could be reactive next to adjacent medial meniscus tear versus a mild sprain.   Chondromalacia of the patellofemoral and medial tibiofemoral compartments.    X-rays left knee (05/15/2025):  No acute fracture or dislocation.  Moderate tibiofemoral joint space narrowing chest progressed compared to prior radiographs, particularly on the left medial compartment.  Small ossific density in the left lateral compartment which was not present on prior x-rays.    Kellgren Henry grade 2-3 bilaterally     ASSESSMENT:    1. Acute pain of left knee  X-ray Knee Ortho Left with Flexion (XPD)    traMADoL (ULTRAM) 50 mg tablet    Large Joint Aspiration/Injection: L knee      2. Primary osteoarthritis of left knee  Large Joint Aspiration/Injection: L knee      3. Old complex tear of medial meniscus of left knee            PLAN:     I made the decision to obtain old records of the patient including previous notes and imaging. New imaging was ordered today of the extremity or extremities evaluated. I independently reviewed and interpreted the radiographs and/or MRIs today as well as prior imaging, if available.    We discussed at length different treatment options including conservative vs surgical management. These include anti-inflammatories, acetaminophen, rest, ice, heat, formal physical therapy including strengthening and stretching exercises, home exercise programs, dry needling, corticosteroid versus viscosupplementation injections, and finally surgical intervention.      Left knee corticosteroid injection performed today.  See procedure note for details.    Prescription tramadol 50 to take for severe, breakthrough pain, particularly at night provided today.  Continue topical Voltaren gel and over-the-counter extra-strength Tylenol.  Continue regular ice compresses.    Follow up in 3-4 weeks.      All questions were answered, pt will contact us for questions or concerns in the interim.        Medical Dictation software was used during the dictation of portions or the entirety of this medical record.   Phonetic or grammatic errors may exist due to the use of this software. For clarification, refer to the author of the document.

## 2025-05-21 ENCOUNTER — PATIENT MESSAGE (OUTPATIENT)
Dept: OBSTETRICS AND GYNECOLOGY | Facility: CLINIC | Age: 65
End: 2025-05-21
Payer: COMMERCIAL

## 2025-05-21 ENCOUNTER — PATIENT MESSAGE (OUTPATIENT)
Dept: PRIMARY CARE CLINIC | Facility: CLINIC | Age: 65
End: 2025-05-21
Payer: COMMERCIAL

## 2025-05-21 DIAGNOSIS — Z12.31 SCREENING MAMMOGRAM, ENCOUNTER FOR: Primary | ICD-10-CM

## 2025-06-01 ENCOUNTER — PATIENT MESSAGE (OUTPATIENT)
Dept: ENDOCRINOLOGY | Facility: CLINIC | Age: 65
End: 2025-06-01
Payer: COMMERCIAL

## 2025-06-02 ENCOUNTER — HOSPITAL ENCOUNTER (OUTPATIENT)
Dept: RADIOLOGY | Facility: HOSPITAL | Age: 65
Discharge: HOME | End: 2025-06-02
Attending: OBSTETRICS & GYNECOLOGY
Payer: COMMERCIAL

## 2025-06-02 VITALS — BODY MASS INDEX: 48.03 KG/M2 | HEIGHT: 62 IN | WEIGHT: 261 LBS

## 2025-06-02 DIAGNOSIS — Z12.31 SCREENING MAMMOGRAM, ENCOUNTER FOR: ICD-10-CM

## 2025-06-02 PROCEDURE — 77063 BREAST TOMOSYNTHESIS BI: CPT | Mod: 26,,, | Performed by: RADIOLOGY

## 2025-06-02 PROCEDURE — 77067 SCR MAMMO BI INCL CAD: CPT | Mod: TC

## 2025-06-02 PROCEDURE — 77067 SCR MAMMO BI INCL CAD: CPT | Mod: 26,,, | Performed by: RADIOLOGY

## 2025-06-03 ENCOUNTER — PATIENT MESSAGE (OUTPATIENT)
Dept: ENDOCRINOLOGY | Facility: CLINIC | Age: 65
End: 2025-06-03
Payer: COMMERCIAL

## 2025-06-03 ENCOUNTER — RESULTS FOLLOW-UP (OUTPATIENT)
Dept: ENDOCRINOLOGY | Facility: CLINIC | Age: 65
End: 2025-06-03

## 2025-06-03 NOTE — TELEPHONE ENCOUNTER
JALYNI, pt has visit coming up with you on  6/18 - she got TSH drawn yesterday but looks like the order they used was one put in by dr barney

## 2025-06-08 NOTE — PROGRESS NOTES
"[facility] Note    Chief Complaint    No chief complaint on file.      History of Present Illness      Bree Gonzales is a 64 y.o.   F with HTN, Hypothyroidism, Depression, Anxiety, FM, OA, and Osteoporosis, Anemia of CKD III, HLD, Vit D def, FM, Peripheral neuropathy, Morbid Obesity, and fam h/o Breast Ca presents to fu chronic issues. Last visit - 4/2/25      The patient location is: Home  The chief complaint leading to consultation is: "Fu DEXTER/CPAP"    Visit type: audiovisual    Face to Face time with patient: 12 min.  12 minutes of total time spent on the encounter, which includes face to face time and non-face to face time preparing to see the patient (eg, review of tests), Obtaining and/or reviewing separately obtained history, Documenting clinical information in the electronic or other health record, Independently interpreting results (not separately reported) and communicating results to the patient/family/caregiver, or Care coordination (not separately reported).         Each patient to whom he or she provides medical services by telemedicine is:  (1) informed of the relationship between the physician and patient and the respective role of any other health care provider with respect to management of the patient; and (2) notified that he or she may decline to receive medical services by telemedicine and may withdraw from such care at any time.    Notes:        Very severe DEXTER - Snoring - Home sleep study-4/10/25-very severe DEXTER- AHI - 52. She scored 7 on ESS. She is doing well with her CPAP with a nasal mask nightly x 6-8hrs/night.  She feels a little claustrophobic sometimes. She has been sleeping sitting up because she's trying to get used to the mask. Some nights are better than others.  Could consider Zepbound if covered by insurance.      Peroneal tendonintis - tx with medrol per Ortho - 9/24/24     5th Metatarsal Fx - MRI Foot - 7/19/23 - oblique fracture of her 5th metatarsal that " "does not go into the joint space. Fu by Ortho.        H/o Melanoma - Fu by Derm. Doing well. Had resection of lesion to Left cheek 6/17/21.       HTN - Pt controlled on Toprol 100 mg/d, and Olmesartan 40 mg/d. Rec inc hydration.  Could be related to her claritin.  Offered to add astelin pt declines. Kidney function has decreased. Rec inc hydration. She will send me a copy of her BP log in 2 wks.      Hypothyroidism - Pt on Brand Name Synthroid 125 mcg/d. Pt is s/p Total Thyroidectomy due to thyroid Ca. TSH -3.631-6/2/25.   Fu by Dr. Tri Haile.       Depression - Pt on Venlafaxine  mg +37.5 mg/d. She recently moved and cares for her elderly Mom. Stable.       Anxiety -  Pt on Venlafaxine  mg +37.5 mg/d. Her mom has dementia. Stable. No change.  "I'm just a worrier. My biggest concern is my son getting  and that's over with".      H/o Anemia of CKD III - Resolved. Fu by Dr. Sarmiento.        CKD III -  Fu by Dr. Sarmiento.  Pt could not afford Farxiga 10 mg/d because insurance would not cover it. Stable.       H/o Hypercalcemia - Calcium is 9.6 down from 11.2.  PTH - 57  - wnl - 1/9/24. We held her calcium suppl. and we stopped her  hydrochlorothiazide due to hypercalcemia.  I had her inc Toprol to 100 mg daily.  Kidney function remains stable. Continue to work on adequate hydration.      HLD - Pt on atorvastatin 40 mg QHS. She c/o flatulence since starting this. She does not feel we need to stop the medication or try an alternate medication.  She will alert me if this changes.  Can take prn Gas-X. Cholesterol looked better in that  LDL (bad cholesterol) improved.Triglycerides were mildly elevated. We inc atorvastatin to 40 mg.     Vit D def - Pt is on a MVI and Ca+D.      OA - Fu by Rheum, Dr. Grimes. MRI Knee pain - 6/29/23. Fu by Ortho. S/p Euflexxa 8/10/23 and 8/24/23.      Osteoporosis - Pt on Prolia. Last inj was in Dec. Knuckles hurt. Can try Voltaren gel. Sched for DEXA in 1 wk.      FM - " Pt on Lyrica 25 mg BID.       Peripheral neuropathy - Foot pain - She c/o numbness and throbbing at night. Wearing socks help.  Cymbalta 20 mg/d no help. Pt is on Lyrica 50mg BID.  Vitamin B12 and folic acid levels were both normal. Unclear etiol.  Can refer to Neurology in future for EMG.  Pt defers for now.  Stable.       Morbid Obesity - BMI -47.74 -She has not been exercising as much due to Foot and knee pain.  She tries to cut back on carbs/starches.   Recent normal HA1c. Pt tries to limit carbs.  Rec goal 150 min/wk.       fam h/o Breast Ca - Pt is BRCA neg. Due for MGM in May      H/o Melanoma - Fu by derm regularly.      H/o Malignant Hyperthermia -s/p anesthesia     Lab review:   6/8/22 - BUN/Cr - 32/1.21 GFR 49  3/17/22 - BUN/Cr - 38/1.19 - GFR - 50;  H/H - 11.2/36; iPTH - 81; Fe - 68; TIBC 490 -elev; %Sat - 14%; Vit D - 52  12/6/21 - BUN/Cr - 33/1.10; H/H - 12/37.4.     HCM - Flu - 9/21/23;  Tdap - 6/1/23;  PCV 13 - none;  PVX 23 - 1/25/17;  Shingrix - 8/26/20 and #2 - 11/11/21;  Zostavax - 2/24/15; COVID - 19 Vaccine (Pfiizer)  #1 2/26/21; #2 3/19/21; #3 - 10/26/21; # 4 8/1/22; # 5 6/1/23; MGM -6/2/25- repeat 1 yr;  DEXA - 11/2020 - +Osteoporosis- had recent DA [facility] Dr. Grimes - will obtain copy for review.;  PAP - 5/26/23 - neg. ; Hep C Screen - 12/6/21 - neg. ;  HIV Screen - 12/6/21 - neg. ; C-scope -5/20/25- Polyps, hemorrhoids - repeat 5 yrs per pt;   Prev PCP - Dr. Duckworth;  Rheum - Dr. Grimes; Renal - Dr. Sarmiento; Derm - Dr. Delahoussaye- delgado; Hand Sx - Dr. Quiroz; Ob/GYN - NP - Quiana Sotomayor; Endo - Dr. Vitale; Ophtho - Dr. Kennedy; GI - Dr. Bansal; Well visit - 4/2/25      Patient Care Team:  Shana Jade MD as PCP - General (Internal Medicine)  Praneeth Bansal MD as Consulting Physician (Gastroenterology)  Gatito Grimes Jr., MD (Rheumatology)     Health Maintenance:  Immunization History   Administered Date(s) Administered    COVID-19 Vaccine 09/21/2023    COVID-19, MRNA,  LN-S, PF (Pfizer) (Gray Cap) 07/31/2022    COVID-19, MRNA, LN-S, PF (Pfizer) (Purple Cap) 02/26/2021, 03/19/2021, 10/26/2021, 08/01/2022    COVID-19, mRNA, LNP-S, PF (Moderna) Ages 12+ 09/21/2023, 09/24/2024    COVID-19, mRNA, LNP-S, bivalent booster, PF (Moderna Omicron)12 + YEARS 06/01/2023    Hepatitis B (recombinant) Adjuvanted, 2 dose 07/15/2024    Influenza - Quadrivalent 10/03/2019    Influenza - Quadrivalent - MDCK - PF 08/26/2020, 09/13/2022, 09/21/2023    Influenza - Quadrivalent - PF *Preferred* (6 months and older) 10/02/2013, 10/07/2014, 09/30/2015, 10/20/2016, 10/05/2017, 10/03/2018, 10/12/2021    Influenza - Trivalent - Afluria, Fluzone MDV 10/06/2010, 10/10/2011, 10/02/2013, 10/07/2014, 10/05/2017    Influenza - Trivalent - Fluarix, Flulaval, Fluzone, Afluria - PF 09/30/2015, 10/20/2016, 09/24/2024    Pneumococcal Polysaccharide - 23 Valent 01/25/2017    Rsv, Bivalent, Rsvpref (Abrysvo) 09/24/2024    Tdap 04/30/2014, 05/02/2014, 06/01/2023    Zoster 02/24/2015, 08/26/2020, 11/11/2021    Zoster Recombinant 08/26/2020, 11/11/2021      Health Maintenance   Topic Date Due    Annual UACr  Never done    Pneumococcal Vaccines (Age 50+) (2 of 2 - PCV) 01/25/2018    COVID-19 Vaccine (8 - 2024-25 season) 11/19/2024    Mammogram  06/02/2026    Hemoglobin A1c (Diabetic Prevention Screening)  06/06/2027    Colorectal Cancer Screening  05/20/2028    Cervical Cancer Screening  05/26/2028    Lipid Panel  04/02/2030    TETANUS VACCINE  06/01/2033    Hepatitis C Screening  Completed    Shingles Vaccine  Completed    Influenza Vaccine  Completed    HIV Screening  Completed    RSV Vaccine (Age 60+ and Pregnant patients)  Completed        Past Medical History:  Past Medical History:   Diagnosis Date    BRCA1 negative     BRCA2 negative     CKD (chronic kidney disease), stage III 10/12/2021    Family history of breast cancer     Fibromyalgia     Hypertension     Hypothyroidism     Malignant hyperthermia due to anesthesia      Malignant melanoma of skin, unspecified     Osteoporosis     Skin cancer 10/12/2021    Thyroid cancer 10/12/2021       Past Surgical History:   has a past surgical history that includes Cholecystectomy (05/1981); Total thyroidectomy (03/2002); Skin cancer excision; Dilation and curettage of uterus; Trigger finger release (Right, 11/2020); Cataract extraction, bilateral; and Eye surgery (4/2017).    Family History:  family history includes Arthritis in her mother; Breast cancer in her maternal grandmother; Breast cancer (age of onset: 51) in her sister; Breast cancer (age of onset: 74) in her mother; Cancer in her mother and sister; Dementia in her mother; Hearing loss in her father; Melanoma in her father; Mental illness in her mother.     Social History:  Social History[1]    Review of Systems   Constitutional:  Negative for activity change and unexpected weight change.   HENT:  Negative for hearing loss, rhinorrhea and trouble swallowing.    Eyes:  Negative for discharge and visual disturbance.   Respiratory:  Negative for chest tightness and wheezing.    Cardiovascular:  Negative for chest pain and palpitations.   Gastrointestinal:  Negative for blood in stool, constipation, diarrhea and vomiting.   Endocrine: Negative for polydipsia and polyuria.   Genitourinary:  Negative for difficulty urinating, dysuria, hematuria and menstrual problem.   Musculoskeletal:  Positive for arthralgias. Negative for joint swelling and neck pain.   Neurological:  Negative for weakness and headaches.   Psychiatric/Behavioral:  Negative for confusion and dysphoric mood.         Medications:  Current Medications[2]     Allergies:  Review of patient's allergies indicates:   Allergen Reactions    Nsaids (non-steroidal anti-inflammatory drug)     Neosporin (neomycin-polymyx) Rash       Physical Exam      Vital Signs  BP: (!) 138/90             Physical Exam  Constitutional:       General: She is not in acute distress.     Appearance:  Normal appearance. She is not ill-appearing, toxic-appearing or diaphoretic.   HENT:      Head: Normocephalic and atraumatic.   Pulmonary:      Effort: No respiratory distress.   Neurological:      General: No focal deficit present.      Mental Status: She is alert and oriented to person, place, and time.   Psychiatric:         Mood and Affect: Mood normal.         Behavior: Behavior normal.          Laboratory:  CBC:  Recent Labs   Lab 11/14/22  1034 06/06/24  0922 04/02/25  1038   WBC 7.52 8.0 7.63   RBC 4.33 4.32 4.24   Hemoglobin 12.4 13.3  --    HGB  --   --  13.1   Hematocrit 38.7 40.9  --    HCT  --   --  41.5   Platelet Count  --   --  251   Platelets 275 285  --    MCV 89 94.7 98   MCH 28.6 30.8 30.9   MCHC 32.0 32.5 31.6 L       CMP:  Recent Labs   Lab 09/30/24  1123 04/02/25  1038 05/27/25  0939   Glucose 76 96 98   Calcium 9.8 9.7 9.6   Albumin 3.7 3.6  --    Albumin Level  --   --  4.2   Protein Total  --  6.8  --    Total Protein 6.5  --   --    Sodium 138 137 140   Potassium 4.2 3.9 4.0   CO2 32 H 30 H  --    Carbon Dioxide  --   --  30   Chloride 99 102  --    BUN 44 H 26 H  --    Blood Urea Nitrogen  --   --  21   Creatinine 1.3 1.1 1.10 H   Alkaline Phosphatase 87  --  99   ALP  --  85  --    ALT 19 18 15   AST 15 19 12   Total Bilirubin 0.5  --  0.6   Bilirubin Total  --  0.5  --      LIPIDS:  Recent Labs   Lab 06/01/23  0854 09/11/23  1255 06/06/24  0922 06/24/24  0902 08/06/24  0929 09/30/24  1123 04/02/25  1038 06/02/25  1201   TSH 0.04 L   < >  --    < > 0.16 L 0.431  --  3.631   HDL 64  --  59  --   --   --   --   --    HDL Cholesterol  --   --   --   --   --   --  47  --    Cholesterol Total  --   --   --   --   --   --  180  --    Cholesterol 181  --  203 H  --   --   --   --   --    Triglycerides 199 H  --  189 H  --   --   --   --   --    Triglyceride  --   --   --   --   --   --  237 H  --    LDL Cholesterol 87  --  114 H  --   --   --  85.6  --    HDL/Cholesterol Ratio 2.8  --  3.4  --    --   --  26.1  --    Non HDL Cholesterol  --   --   --   --   --   --  133  --    Non HDL Chol. (LDL+VLDL) 117  --  144 H  --   --   --   --   --    Cholesterol/HDL Ratio  --   --   --   --   --   --  3.8  --     < > = values in this interval not displayed.       TSH:  Recent Labs   Lab 08/06/24  0929 09/30/24  1123 06/02/25  1201   TSH 0.16 L 0.431 3.631       A1C:  Recent Labs   Lab 06/01/23  0854 06/06/24  0922   Hemoglobin A1C 4.9 4.9       Other:   Recent Labs   Lab 09/11/23  1255   Vitamin B-12 717           Assessment/Plan     Bree Gonzales is a 64 y.o.female with:    Severe obstructive sleep apnea  - Pt doing well and trying to get adjusted to APAP.  Rec diet and exercise  for wt loss.  Pt is trying to get Zepbound if covered.     Hypertension, unspecified type  - Sl elev.  Cont CPAP. Keep BP log and send to MD in 2 wks.     Osteoporosis, unspecified osteoporosis type, unspecified pathological fracture presence  - Sched for upcoming Prolia.      Hyperlipidemia, unspecified hyperlipidemia type   - Stable. Recently inc Lipitor.     Chronic conditions status updated as per HPI.  Other than changes above, cont current medications and maintain follow up with specialists.  No follow-ups on file.      Shana Jade MD  [facility]                       [1]   Social History  Tobacco Use    Smoking status: Never    Smokeless tobacco: Never   Substance Use Topics    Alcohol use: Never    Drug use: Never   [2]   Current Outpatient Medications:     atorvastatin (LIPITOR) 40 MG tablet, Take 1 tablet (40 mg total) by mouth every evening., Disp: 90 tablet, Rfl: 1    cetirizine 10 mg Cap, Take by mouth., Disp: , Rfl:     levothyroxine (SYNTHROID) 125 MCG tablet, Take 1 tablet (125 mcg total) by mouth before breakfast., Disp: 30 tablet, Rfl: 11    metoprolol succinate (TOPROL-XL) 100 MG 24 hr tablet, Take 1 tablet (100 mg total) by mouth once daily., Disp: 90 tablet, Rfl: 3    olmesartan (BENICAR) 40 MG  tablet, TAKE 1 TABLET BY MOUTH EVERY DAY, Disp: 90 tablet, Rfl: 3    pregabalin (LYRICA) 50 MG capsule, Take 50 mg by mouth 2 (two) times a day., Disp: , Rfl:     venlafaxine (EFFEXOR-XR) 150 MG Cp24, TAKE 1 CAPSULE (150 MG TOTAL) BY MOUTH EVERY EVENING. WITH 37.5MG CAPSULE. TOTAL DAILY DOSE 187.5MG, Disp: 90 capsule, Rfl: 2    venlafaxine (EFFEXOR-XR) 37.5 MG 24 hr capsule, TAKE 1 CAPSULE BY MOUTH EVERY DAY WITH 150 MG FOR A TOTAL DAILY DOSE .5, Disp: 90 capsule, Rfl: 2    denosumab (PROLIA) 60 mg/mL Syrg, Inject 1 mL (60 mg total) into the skin every 6 (six) months., Disp: , Rfl:     traMADoL (ULTRAM) 50 mg tablet, Take 1 tablet (50 mg total) by mouth every 8 (eight) hours as needed for Pain., Disp: 20 tablet, Rfl: 0

## 2025-06-10 ENCOUNTER — PATIENT OUTREACH (OUTPATIENT)
Dept: ADMINISTRATIVE | Facility: HOSPITAL | Age: 65
End: 2025-06-10
Payer: COMMERCIAL

## 2025-06-10 ENCOUNTER — OFFICE VISIT (OUTPATIENT)
Dept: PRIMARY CARE CLINIC | Facility: CLINIC | Age: 65
End: 2025-06-10
Payer: COMMERCIAL

## 2025-06-10 ENCOUNTER — PATIENT MESSAGE (OUTPATIENT)
Dept: PRIMARY CARE CLINIC | Facility: CLINIC | Age: 65
End: 2025-06-10

## 2025-06-10 VITALS — DIASTOLIC BLOOD PRESSURE: 90 MMHG | SYSTOLIC BLOOD PRESSURE: 138 MMHG

## 2025-06-10 DIAGNOSIS — E78.5 HYPERLIPIDEMIA, UNSPECIFIED HYPERLIPIDEMIA TYPE: ICD-10-CM

## 2025-06-10 DIAGNOSIS — G47.33 SEVERE OBSTRUCTIVE SLEEP APNEA: Primary | ICD-10-CM

## 2025-06-10 DIAGNOSIS — I10 HYPERTENSION, UNSPECIFIED TYPE: ICD-10-CM

## 2025-06-10 DIAGNOSIS — M81.0 OSTEOPOROSIS, UNSPECIFIED OSTEOPOROSIS TYPE, UNSPECIFIED PATHOLOGICAL FRACTURE PRESENCE: ICD-10-CM

## 2025-06-10 PROCEDURE — 1159F MED LIST DOCD IN RCRD: CPT | Mod: CPTII,95,, | Performed by: INTERNAL MEDICINE

## 2025-06-10 PROCEDURE — 3075F SYST BP GE 130 - 139MM HG: CPT | Mod: CPTII,95,, | Performed by: INTERNAL MEDICINE

## 2025-06-10 PROCEDURE — 3080F DIAST BP >= 90 MM HG: CPT | Mod: CPTII,95,, | Performed by: INTERNAL MEDICINE

## 2025-06-10 PROCEDURE — 1160F RVW MEDS BY RX/DR IN RCRD: CPT | Mod: CPTII,95,, | Performed by: INTERNAL MEDICINE

## 2025-06-10 PROCEDURE — 4010F ACE/ARB THERAPY RXD/TAKEN: CPT | Mod: CPTII,95,, | Performed by: INTERNAL MEDICINE

## 2025-06-10 PROCEDURE — 98006 SYNCH AUDIO-VIDEO EST MOD 30: CPT | Mod: 95,,, | Performed by: INTERNAL MEDICINE

## 2025-06-10 NOTE — LETTER
AUTHORIZATION FOR RELEASE OF   CONFIDENTIAL INFORMATION    Dear Dr Bansal,    We are seeing Bree Gonzales, date of birth 1960, in the clinic at [facility]. Shana Jade MD is the patient's PCP. Bree Gonzales has an outstanding lab/procedure at the time we reviewed her chart. In order to help keep her health information updated, she has authorized us to request the following medical record(s):        (  )  MAMMOGRAM                                      ( X )  COLONOSCOPY      (  )  PAP SMEAR                                          (  )  OUTSIDE LAB RESULTS     (  )  DEXA SCAN                                          (  )  EYE EXAM            (  )  FOOT EXAM                                          (  )  ENTIRE RECORD     (  )  OUTSIDE IMMUNIZATIONS                 (  )  _______________         Please fax records to Shana Jade MD, [phone] attn Jorge L          Patient Name: Bree Gonzales  : 1960  Patient Phone #: [phone]                          Bree Gonzales  MRN: [id number]  : 1960  Age: 64 y.o.  Sex: female         Patient/Legal Guardian Signature  This signature was collected at 2025           _______________________________   Printed Name/Relationship to Patient      Consent for Examination and Treatment: I hereby authorize the providers and employees of [facility] ([facility]) to provide medical treatment/services which includes, but is not limited to, performing and administering tests and diagnostic procedures that are deemed necessary, including, but not limited to, imaging examinations, blood tests and other laboratory procedures as may be required by the hospital, clinic, or may be ordered by my physician(s) or persons working under the general and/or special instructions of my physician(s).      I understand and agree that this consent covers all authorized persons, including but not limited to physicians, residents, nurse  practitioners, physicians' assistants, specialists, consultants, student nurses, and independently contracted physicians, who are called upon by the physician in charge, to carry out the diagnostic procedures and medical or surgical treatment.     I hereby authorize [facility] to retain or dispose of any specimens or tissue, should there be such remaining from any test or procedure.     I hereby authorize and give consent for [facility] providers and employees to take photographs, images or videotapes of such diagnostic, surgical or treatment procedures of Patient as may be required by [facility] or as may be ordered by a physician. I further acknowledge and agree that [facility] may use cameras or other devices for patient monitoring.     I am aware that the practice of medicine is not an exact science, and I acknowledge that no guarantees have been made to me as to the outcome of any tests, procedures or treatment.     Authorization for Release of Information: I understand that my insurance company and/or their agents may need information necessary to make determinations about payment/reimbursement. I hereby provide authorization to release to all insurance companies, their successors, assignees, other parties with whom they may have contracted, or others acting on their behalf, that are involved with payment for any hospital and/or clinic charges incurred by the patient, any information that they request and deem necessary for payment/reimbursement, and/or quality review.  I further authorize the release of my health information to physicians or other health care practitioners on staff who are involved in my health care now and in the future, and to other health care providers, entities, or institutions for the purpose of my continued care and treatment, including referrals.     REGISTRATION AUTHORIZATION  Form No. 86524 (Rev. 3/25/2024)    Page 1 of 3                       Medicare Patient's Certification and Authorization  to Release Information and Payment Request:  I certify that the information given by me in applying for payment under Title XVIII of the Social Security Act is correct. I authorize any saxena of medical or other information about me to release to the Social SecurityAdministration, or its intermediaries or carriers, any information needed for this or a related Medicare claim. I request that payment of authorized benefits be made on my behalf.     Assignment of Insurance Benefits:   I hereby authorize any and all insurance companies, health plans, defined   benefit plans, health insurers or any entity that is or may be responsible for payment of my medical expenses to pay all hospital and medical benefits now due, and to become due and payable to me under any hospital benefits, sick benefits, injury benefits or any other benefit for services rendered to me, including Major Medical Benefits, direct to [facility] and all independently contracted physicians. I assign any and all rights that I may have against any and all insurance companies, health plans, defined benefit plans, health insurers or any entity that is or may be responsible for payment of my medical expenses, including, but not limited to any right to appeal a denial of a claim, any right to bring any action, lawsuit, administrative proceeding, or other cause of action on my behalf. I specifically assign my right to pursue litigation against any and all insurance companies, health plans, defined benefit plans, health insurers or any entity that is or may be responsible for payment of my medical expenses based upon a refusal to pay charges.            E. Valuables: It is understood and agreed that [facility] is not liable for the damage to or loss of any money, jewelry,   documents, dentures, eye glasses, hearing aids, prosthetics, or other property of value.     F. Computer Equipment: I understand and agree that should I choose to use computer equipment owned by  [facility] or if I choose to access the Internet via [facility]s network, I do so at my own risk. [facility] is not responsible for any damage to my computer equipment or to any damages of any type that might arise from my loss of equipment or data.     G. Acceptance of Financial Responsibility:  I agree that in consideration of the services and   supplies that have been   or will be furnished to the patient, I am hereby obligated to pay all charges made for or on the account of the patient according to the standard rates (in effect at the time the services and supplies are delivered) established by [facility], including its Patient Financial Assistance Policy to the extent it is applicable. I understand that I am responsible for all charges, or portions thereof, not covered by insurance or other sources. Patient refunds will be distributed only after balances at all [facility] facilities are paid.     H. Communication Authorization:  I hereby authorize [facility] and its representatives, along with any billing service   or  who may work on their behalf, to contact me on   my cell phone and/or home phone using pre- recorded messages, artificial voice messages, automatic telephone dialing devices or other computer assisted technology, or by electronic      mail, text messaging, or by any other form of electronic communication. This includes, but is not limited to, appointment reminders, yearly physical exam reminders, preventive care reminders, patient campaigns, welcome calls, and calls about account balances on my account or any account on which I am listed as a guarantor. I understand I have the right to opt out of these communications at any time.      Relationship  Between  Facility and  Provider:      I understand that some, but not all, providers furnishing services to the patient are not employees or agents of [facility]. The patient is under the care and supervision of his/her attending physician, and it is the  responsibility of the facility and its nursing staff to carry out the instructions of such physicians. It is the responsibility of the patient's physician/designee to obtain the patient's informed consent, when required, for medical or surgical treatment, special diagnostic or therapeutic procedures, or hospital services rendered for the patient under the special instructions of the physician/designee.           REGISTRATION AUTHORIZATION  Form No. 60562 (Rev. 3/25/2024)    Page 2 of 3                       Immunizations: [facility] shares immunization information with state sponsored health departments to help you and your doctor keep track of your immunization records. By signing, you consent to have this information shared with the health department in your state:                                [place] - LINKS ([place] Immunization Network for Kids Statewide)                                [place] - MIIX ([place] Immunization Information eXchange)                                [place] - ImmPRINT (Immunization Patient Registry with Integrated Technology)     TERM: This authorization is valid for this and subsequent care/treatment I receive at [facility] and will remain valid unless/until revoked in writing by me.     [facility]: As used in this document, [facility] means all [facility] owned and managed facilities, including, but not limited to, all health centers, surgery centers, clinics, urgent care centers, and hospitals.         [facility] complies with applicable Federal civil rights laws and does not discriminate on the basis of race, color, national origin, age, disability, or sex.  ATENCIÓN: si habla español, tiene a alan disposición servicios gratuitos de asistencia lingüística. Shama mackenzie [phone].  KANDI Ý: N?u b?n nói Ti?ng Vi?t, có các d?ch v? h? tr? ngôn ng? mi?n phí dành cho b?n. G?i s? [phone].        REGISTRATION AUTHORIZATION  Form No. 90752 (Rev. 3/25/2024)    Page 3 of 3

## 2025-06-10 NOTE — PROGRESS NOTES
Outside colonoscopy requested from carey Bansal  Pt saw PCP today 6/10/25    , care everywhere, immunizations updated  Chart review complete

## 2025-06-17 ENCOUNTER — OFFICE VISIT (OUTPATIENT)
Dept: SPORTS MEDICINE | Facility: CLINIC | Age: 65
End: 2025-06-17
Payer: COMMERCIAL

## 2025-06-17 VITALS
BODY MASS INDEX: 47.87 KG/M2 | HEART RATE: 76 BPM | SYSTOLIC BLOOD PRESSURE: 132 MMHG | DIASTOLIC BLOOD PRESSURE: 83 MMHG | WEIGHT: 260.13 LBS | HEIGHT: 62 IN

## 2025-06-17 DIAGNOSIS — M17.12 PRIMARY OSTEOARTHRITIS OF LEFT KNEE: Primary | ICD-10-CM

## 2025-06-17 DIAGNOSIS — M23.204 OLD COMPLEX TEAR OF MEDIAL MENISCUS OF LEFT KNEE: ICD-10-CM

## 2025-06-17 PROCEDURE — 99214 OFFICE O/P EST MOD 30 MIN: CPT | Mod: S$GLB,,, | Performed by: PHYSICIAN ASSISTANT

## 2025-06-17 PROCEDURE — 1159F MED LIST DOCD IN RCRD: CPT | Mod: CPTII,S$GLB,, | Performed by: PHYSICIAN ASSISTANT

## 2025-06-17 PROCEDURE — 4010F ACE/ARB THERAPY RXD/TAKEN: CPT | Mod: CPTII,S$GLB,, | Performed by: PHYSICIAN ASSISTANT

## 2025-06-17 PROCEDURE — 1160F RVW MEDS BY RX/DR IN RCRD: CPT | Mod: CPTII,S$GLB,, | Performed by: PHYSICIAN ASSISTANT

## 2025-06-17 PROCEDURE — 99999 PR PBB SHADOW E&M-EST. PATIENT-LVL III: CPT | Mod: PBBFAC,,, | Performed by: PHYSICIAN ASSISTANT

## 2025-06-17 PROCEDURE — 3008F BODY MASS INDEX DOCD: CPT | Mod: CPTII,S$GLB,, | Performed by: PHYSICIAN ASSISTANT

## 2025-06-17 PROCEDURE — 3075F SYST BP GE 130 - 139MM HG: CPT | Mod: CPTII,S$GLB,, | Performed by: PHYSICIAN ASSISTANT

## 2025-06-17 PROCEDURE — 3079F DIAST BP 80-89 MM HG: CPT | Mod: CPTII,S$GLB,, | Performed by: PHYSICIAN ASSISTANT

## 2025-06-17 NOTE — PROGRESS NOTES
CC: Left knee pain    Patient returns to clinic today for follow-up evaluation of left knee pain. She denies any new falls, injuries, or trauma to the left knee since her last visit.  She reports significant relief of pain after receiving a steroid injection at her last visit.  She has also been taking Tylenol and using topical analgesics and ice compresses it is in the needed for pain.  She does continue to endorse some mechanical clicking in the left knee, however this is not seem to cause pain.  She is able to walk without any issues.  Overall, doing well.    HPI (5/15/2025):  Bree presents with knee pain and swelling that started about a week ago. She reports sudden onset on the top of her knee, describing it as severe pain compared to the previous burning sensation. Pain severity has fluctuated, at one point affecting her ability to walk straight, but is currently tolerable. However, she notes it has been progressively worsening. The pain is localized to a specific area, different from previous pain locations.    She reports difficulty walking and knee instability, especially initially. She denies any specific traumatic event but suggests it might be related to wearing rubber shoes that can snag on the floor or unexpected movements, such as tripping over her dog. She has experienced unexpected turns or movements that may have contributed to the pain, but has not fallen.    For pain management, she uses ice therapy at night and takes Tylenol, which provide some relief. Initially, the knee felt swollen and hot. She is currently taking Gabapentin twice daily for numbness in her toes. She mentions CKD as a reason for not taking anti-inflammatory medications.    She denies any clicking, catching, or grinding in the knee.    PREVIOUS TREATMENTS:  Bree has been using ice on her knee at night, which helps alleviate pain. She has been taking Tylenol for pain management, which is particularly effective at night, aiding her  in falling asleep. Previously, the patient received gel injections in her knee. Prior to the gel injections, she had a cortisone injection in the same knee.        She has known osteoarthritis of her knee and degenerative tear of the medial meniscus confirmed on MRI which has been treated conservatively thus far.       - mechanical symptoms, + instability    Is affecting ADLs.  Pain is 7/10 at it's worst.    REVIEW OF SYSTEMS:  Constitution: Negative. Negative for chills, fever and night sweats.   HENT: Negative for congestion and headaches.    Eyes: Negative for blurred vision, left vision loss and right vision loss.   Cardiovascular: Negative for chest pain and syncope.   Respiratory: Negative for cough and shortness of breath.    Endocrine: Negative for polydipsia, polyphagia and polyuria.   Hematologic/Lymphatic: Negative for bleeding problem. Does not bruise/bleed easily.   Skin: Negative for dry skin, itching and rash.   Musculoskeletal: Negative for falls. Positive for left knee pain and  muscle weakness.   Gastrointestinal: Negative for abdominal pain and bowel incontinence.   Genitourinary: Negative for bladder incontinence and nocturia.   Neurological: Negative for disturbances in coordination, loss of balance and seizures.   Psychiatric/Behavioral: Negative for depression. The patient does not have insomnia.    Allergic/Immunologic: Negative for hives and persistent infections.     PAST MEDICAL HISTORY:    Past Medical History:   Diagnosis Date    BRCA1 negative     BRCA2 negative     CKD (chronic kidney disease), stage III 10/12/2021    Family history of breast cancer     Fibromyalgia     Hypertension     Hypothyroidism     Malignant hyperthermia due to anesthesia     Malignant melanoma of skin, unspecified     Osteoporosis     Skin cancer 10/12/2021    Thyroid cancer 10/12/2021       PAST SURGICAL HISTORY:   Past Surgical History:   Procedure Laterality Date    CATARACT EXTRACTION, BILATERAL       CHOLECYSTECTOMY  05/1981    DILATION AND CURETTAGE OF UTERUS      EYE SURGERY  4/2017    Both eye cataract surgery    SKIN CANCER EXCISION      TOTAL THYROIDECTOMY  03/2002    TRIGGER FINGER RELEASE Right 11/2020       FAMILY HISTORY:   Family History   Problem Relation Name Age of Onset    Breast cancer Mother Yvonne Salas 74    Dementia Mother Yvonne Salas     Arthritis Mother Yvonne Salas     Cancer Mother Yvonne Salas     Mental illness Mother Yvonne Salas     Hearing loss Father Krish Salas     Melanoma Father Krish Salas     Breast cancer Sister Paulina Morgan 51    Cancer Sister Paulina Morgan     Breast cancer Maternal Grandmother      Colon cancer Neg Hx      Ovarian cancer Neg Hx         SOCIAL HISTORY:   Social History     Socioeconomic History    Marital status:    Tobacco Use    Smoking status: Never    Smokeless tobacco: Never   Substance and Sexual Activity    Alcohol use: Never    Drug use: Never    Sexual activity: Not Currently     Partners: Male     Birth control/protection: Post-menopausal     Comment:      Social Drivers of Health     Financial Resource Strain: Low Risk  (5/23/2025)    Received from [facility]    Overall Financial Resource Strain (CARDIA)     Difficulty of Paying Living Expenses: Not very hard   Food Insecurity: No Food Insecurity (5/23/2025)    Received from [facility]    Hunger Vital Sign     Worried About Running Out of Food in the Last Year: Never true     Ran Out of Food in the Last Year: Never true   Transportation Needs: No Transportation Needs (5/23/2025)    Received from [facility]    PRAPARE - Transportation     Lack of Transportation (Medical): No     Lack of Transportation (Non-Medical): No   Physical Activity: Unknown (5/23/2025)    Received from [facility]    Exercise Vital Sign     Days of Exercise per Week: 0 days   Recent Concern: Physical Activity - Inactive (5/12/2025)    Exercise Vital Sign     Days of Exercise per Week:  "0 days     Minutes of Exercise per Session: 0 min   Stress: Stress Concern Present (5/23/2025)    Received from [facility] - Occupational Stress Questionnaire     Feeling of Stress : To some extent   Housing Stability: Unknown (5/23/2025)    Received from [facility]    Housing Stability Vital Sign     Unable to Pay for Housing in the Last Year: No       MEDICATIONS:     Current Outpatient Medications:     atorvastatin (LIPITOR) 40 MG tablet, Take 1 tablet (40 mg total) by mouth every evening., Disp: 90 tablet, Rfl: 1    cetirizine 10 mg Cap, Take by mouth., Disp: , Rfl:     levothyroxine (SYNTHROID) 125 MCG tablet, Take 1 tablet (125 mcg total) by mouth before breakfast., Disp: 30 tablet, Rfl: 11    metoprolol succinate (TOPROL-XL) 100 MG 24 hr tablet, Take 1 tablet (100 mg total) by mouth once daily., Disp: 90 tablet, Rfl: 3    olmesartan (BENICAR) 40 MG tablet, TAKE 1 TABLET BY MOUTH EVERY DAY, Disp: 90 tablet, Rfl: 3    pregabalin (LYRICA) 50 MG capsule, Take 50 mg by mouth 2 (two) times a day., Disp: , Rfl:     traMADoL (ULTRAM) 50 mg tablet, Take 1 tablet (50 mg total) by mouth every 8 (eight) hours as needed for Pain., Disp: 20 tablet, Rfl: 0    venlafaxine (EFFEXOR-XR) 150 MG Cp24, TAKE 1 CAPSULE (150 MG TOTAL) BY MOUTH EVERY EVENING. WITH 37.5MG CAPSULE. TOTAL DAILY DOSE 187.5MG, Disp: 90 capsule, Rfl: 2    venlafaxine (EFFEXOR-XR) 37.5 MG 24 hr capsule, TAKE 1 CAPSULE BY MOUTH EVERY DAY WITH 150 MG FOR A TOTAL DAILY DOSE .5, Disp: 90 capsule, Rfl: 2    denosumab (PROLIA) 60 mg/mL Syrg, Inject 1 mL (60 mg total) into the skin every 6 (six) months., Disp: , Rfl:     ALLERGIES:   Review of patient's allergies indicates:   Allergen Reactions    Nsaids (non-steroidal anti-inflammatory drug)     Neosporin (neomycin-polymyx) Rash       VITAL SIGNS:   /83 (Patient Position: Sitting)   Pulse 76   Ht 5' 2" (1.575 m)   Wt 118 kg (260 lb 2.3 oz)   BMI 47.58 " kg/m²      PHYSICAL EXAMINATION  General:  The patient is alert and oriented x 3.  Mood is pleasant.  Observation of ears, eyes and nose reveal no gross abnormalities.  No labored breathing observed.    LEFT KNEE EXAMINATION     OBSERVATION / INSPECTION   Gait:   antalgic   Alignment:  Neutral   Scars:   None   Muscle atrophy: Mild  Effusion:  Difficult to assess due to patient's body habitus  Warmth:  None   Discoloration:   none     TENDERNESS / CREPITUS (T / C):          T / C      T / C   Patella   - / -   Lateral joint line   - / -   Peripatellar medial  -  Medial joint line    - / -   Peripatellar lateral -  Medial plica   - / -   Patellar tendon -   Popliteal fossa   - / -   Quad tendon   -   Gastrocnemius   -   Prepatellar Bursa - / -   Quadricep   -   Tibial tubercle  -  Thigh/hamstring  -   Pes anserine/HS -  Fibula    -   ITB   - / -  Tibia     -   Tib/fib joint  - / -  LCL    -     MFC   - / -   MCL: Proximal  -    LFC   - / -    Distal   -          ROM: (* = pain)  PASSIVE   ACTIVE      Left :   5 / 0 / 120   5 / 0 / 120     Right :    5 / 0 / 120   5 / 0 / 120    Patellofemoral examination:  See above noted areas of tenderness.   Patella position    Subluxation / dislocation: Centered           Sup. / Inf;   Normal   Crepitus (PF):    Absent   Patellar Mobility:       Medial-lateral:   Normal    Superior-inferior:  Normal    Inferior tilt   Normal    Patellar tendon:  Normal   Lateral tilt:    Normal   J-sign:     None   Patellofemoral grind:   No pain       MENISCAL SIGNS:     Pain on terminal extension:  -  Pain on terminal flexion:  -  Mary Ellens maneuver:  - (for pain)  Squat     deferred    LIGAMENT EXAMINATION:  ACL / Lachman:  normal (-1 to 2mm)    PCL-Post.  drawer: normal 0 to 2mm  MCL- Valgus:  normal 0 to 2mm  LCL- Varus:  normal 0 to 2mm  Pivot shift: normal (Equal)   Dial Test: difference c/w other side   At 30° flexion: normal (< 5°)    At 90° flexion: normal (< 5°)   Reverse Pivot  Shift:   normal (Equal)     STRENGTH: (* = with pain) PAINFUL SIDE   Quadricep   4/5   Hamstrin/5    EXTREMITY NEURO-VASCULAR EXAMINATION:   Sensation:  Grossly intact to light touch all dermatomal regions.   Motor Function:  Fully intact motor function at hip, knee, foot and ankle    DTRs;  quadriceps and  achilles 2+.  No clonus and downgoing Babinski.    Vascular status:  DP and PT pulses 2+, brisk capillary refill, symmetric.     OTHER FINDINGS:  N/A      MRI left knee (2023):  MRI images of the left knee ordered and independently interpreted by me show:  Complex tear of the medial meniscus with extrusion into the superior medial gutter.  Edema of the MCL which could be reactive next to adjacent medial meniscus tear versus a mild sprain.  Chondromalacia of the patellofemoral and medial tibiofemoral compartments.    X-rays left knee (05/15/2025):  No acute fracture or dislocation.  Moderate tibiofemoral joint space narrowing chest progressed compared to prior radiographs, particularly on the left medial compartment.  Small ossific density in the left lateral compartment which was not present on prior x-rays.    Kellgren Henry grade 2-3 bilaterally     ASSESSMENT:    1. Primary osteoarthritis of left knee        2. Old complex tear of medial meniscus of left knee              PLAN:     Prior imaging reviewed and discussed with patient in detail.    We again discussed at length different treatment options including conservative vs surgical management. These include anti-inflammatories, acetaminophen, rest, ice, heat, formal physical therapy including strengthening and stretching exercises, home exercise programs, dry needling, corticosteroid versus viscosupplementation injections, and finally surgical intervention.      Overall, patient is reporting significant improvement in knee pain.  Recommend continued observation and conservative care at home with over-the-counter medications, activity  modifications, etc.    Follow up with me as needed.      All questions were answered, pt will contact us for questions or concerns in the interim.        Medical Dictation software was used during the dictation of portions or the entirety of this medical record.  Phonetic or grammatic errors may exist due to the use of this software. For clarification, refer to the author of the document.

## 2025-06-18 ENCOUNTER — OFFICE VISIT (OUTPATIENT)
Dept: ENDOCRINOLOGY | Facility: CLINIC | Age: 65
End: 2025-06-18
Payer: COMMERCIAL

## 2025-06-18 VITALS
BODY MASS INDEX: 47.95 KG/M2 | DIASTOLIC BLOOD PRESSURE: 96 MMHG | WEIGHT: 260.56 LBS | HEIGHT: 62 IN | SYSTOLIC BLOOD PRESSURE: 148 MMHG

## 2025-06-18 DIAGNOSIS — C73 THYROID CANCER: Primary | ICD-10-CM

## 2025-06-18 DIAGNOSIS — M81.0 OSTEOPOROSIS, UNSPECIFIED OSTEOPOROSIS TYPE, UNSPECIFIED PATHOLOGICAL FRACTURE PRESENCE: ICD-10-CM

## 2025-06-18 DIAGNOSIS — G47.33 OSA (OBSTRUCTIVE SLEEP APNEA): ICD-10-CM

## 2025-06-18 DIAGNOSIS — E89.0 POSTOPERATIVE HYPOTHYROIDISM: ICD-10-CM

## 2025-06-18 PROCEDURE — 1160F RVW MEDS BY RX/DR IN RCRD: CPT | Mod: CPTII,S$GLB,, | Performed by: INTERNAL MEDICINE

## 2025-06-18 PROCEDURE — 1159F MED LIST DOCD IN RCRD: CPT | Mod: CPTII,S$GLB,, | Performed by: INTERNAL MEDICINE

## 2025-06-18 PROCEDURE — 99999 PR PBB SHADOW E&M-EST. PATIENT-LVL IV: CPT | Mod: PBBFAC,,, | Performed by: INTERNAL MEDICINE

## 2025-06-18 PROCEDURE — 4010F ACE/ARB THERAPY RXD/TAKEN: CPT | Mod: CPTII,S$GLB,, | Performed by: INTERNAL MEDICINE

## 2025-06-18 PROCEDURE — G2211 COMPLEX E/M VISIT ADD ON: HCPCS | Mod: S$GLB,,, | Performed by: INTERNAL MEDICINE

## 2025-06-18 PROCEDURE — 3080F DIAST BP >= 90 MM HG: CPT | Mod: CPTII,S$GLB,, | Performed by: INTERNAL MEDICINE

## 2025-06-18 PROCEDURE — 3008F BODY MASS INDEX DOCD: CPT | Mod: CPTII,S$GLB,, | Performed by: INTERNAL MEDICINE

## 2025-06-18 PROCEDURE — 99214 OFFICE O/P EST MOD 30 MIN: CPT | Mod: S$GLB,,, | Performed by: INTERNAL MEDICINE

## 2025-06-18 PROCEDURE — 3077F SYST BP >= 140 MM HG: CPT | Mod: CPTII,S$GLB,, | Performed by: INTERNAL MEDICINE

## 2025-06-18 RX ORDER — LEVOTHYROXINE SODIUM 125 UG/1
125 TABLET ORAL
Qty: 96 TABLET | Refills: 3 | Status: SHIPPED | OUTPATIENT
Start: 2025-06-18 | End: 2026-06-18

## 2025-06-18 RX ORDER — SEMAGLUTIDE 0.25 MG/.5ML
0.25 INJECTION, SOLUTION SUBCUTANEOUS
COMMUNITY
Start: 2025-06-03 | End: 2025-06-18

## 2025-06-18 NOTE — PROGRESS NOTES
" Bree Gonzales is a 65 y.o. female.presenting for follow-up of thyroid cancer, osteoporosis    Thyroid Cancer:  Previously seen by Dr. Vitale at outside clinic. No records on file.    - Status post total thyroidectomy 03/18/02 with CN neck dissection with Dr. Krish Clarke  - Received mCi of radioactive iodine on: she had radioactive iodine in April and December that year  - Thyroglobulin level: per patient has always been "at goal", undetectable all along  - She was told it was low risk disease  - Current relevant medications: LT4 125 mcg daily    Last TSH above goal for h/o thyroid cancer  Feels well on current dose    Dec/14/23  Tg undetectable   Tg ab <1  TSH 0.02    January 2024 got neck ultrasound for dysphagia - told it was "okay" with no visible thyroid tissue. This was also done at Dr. Vitale's clinic.    Lab Results   Component Value Date    TSH 3.631 06/02/2025    TSH 0.431 09/30/2024    TSH 0.16 (L) 08/06/2024       With regards to osteoporosis:   Current osteoporosis medication:   Prolia - started 2021  Managed by rheumatology. She is about to start process with them of transitioning off    Sep/2023 DXA      Fracture history:  Age 62: foot fracture - non-traumatic fx  Post-menopausal? Age?: 49  ERT after menopause?: yes, but stopped given breast ca hx in family      Lab Results   Component Value Date    CALCIUM 9.6 05/27/2025    CALCIUM 9.7 04/02/2025    CALCIUM 9.8 09/30/2024    PHOS 3.6 11/14/2022    ALKPHOS 99 05/27/2025    ALKPHOS 85 04/02/2025    ALKPHOS 87 09/30/2024    TSH 3.631 06/02/2025     ROS:   As above    Objective:     BP (!) 148/96 (BP Location: Right arm, Patient Position: Sitting)   Ht 5' 2" (1.575 m)   Wt 118.2 kg (260 lb 9.3 oz)   BMI 47.66 kg/m²   BP Readings from Last 3 Encounters:   06/18/25 (!) 148/96   06/17/25 132/83   06/10/25 (!) 138/90     Wt Readings from Last 1 Encounters:   06/18/25 0958 118.2 kg (260 lb 9.3 oz)     Body mass index is 47.66 kg/m².      Physical " "Exam  Vitals reviewed.   Constitutional:       General: She is not in acute distress.     Appearance: Normal appearance. She is not toxic-appearing.   Eyes:      Extraocular Movements: Extraocular movements intact.      Conjunctiva/sclera: Conjunctivae normal.      Pupils: Pupils are equal, round, and reactive to light.   Neck:      Thyroid: No thyroid mass, thyromegaly or thyroid tenderness.   Pulmonary:      Effort: Pulmonary effort is normal. No respiratory distress.   Musculoskeletal:      Cervical back: Neck supple.   Lymphadenopathy:      Cervical: No cervical adenopathy.   Skin:     General: Skin is warm and dry.   Neurological:      General: No focal deficit present.      Mental Status: She is alert and oriented to person, place, and time.   Psychiatric:         Mood and Affect: Mood normal.         Thought Content: Thought content normal.       No palpable thyroid tissue or Lns     Lab Review:   Lab Results   Component Value Date    HGBA1C 4.9 06/06/2024     Lab Results   Component Value Date    CHOL 180 04/02/2025    HDL 47 04/02/2025    LDLCALC 85.6 04/02/2025    TRIG 237 (H) 04/02/2025    CHOLHDL 26.1 04/02/2025     Lab Results   Component Value Date     05/27/2025    K 4.0 05/27/2025     04/02/2025    CO2 30 05/27/2025    GLU 98 05/27/2025    BUN 21 05/27/2025    CREATININE 1.10 (H) 05/27/2025    CALCIUM 9.6 05/27/2025    PROT 6.8 04/02/2025    ALBUMIN 4.2 05/27/2025    BILITOT 0.6 05/27/2025    ALKPHOS 99 05/27/2025    AST 12 05/27/2025    ALT 15 05/27/2025    ANIONGAP 5 (L) 04/02/2025    TSH 3.631 06/02/2025     No results found for: "[id number]"    1. Thyroid cancer    2. Postoperative hypothyroidism        Assessment & Plan    IMPRESSION:    - Considered potential benefits of Zepbound (tirzepatide) for sleep apnea, noting it as preferable to Wegovy for insurance coverage.  - Clarified that tirzepatide's thyroid cancer risk in rats is not applicable to patient's type of thyroid " cancer.    PLAN SUMMARY:  - Order thyroglobulin tumor marker test  - Order thyroid function tests (TSH) in 6 weeks  - Continue Flonase nasal spray for nasal congestion  - Use saline nasal rinse as needed for nasal congestion  - Adjust levothyroxine dosage: Add 1/2 tablet (62.5 mcg) to current 125 mcg dose on Sundays  - Contact office if starting Zepbound (tirzepatide) for thyroid hormone dosing reassessment  - Contact office if endocrinologist managing Prolia discontinuation does not plan alternative osteoporosis medication  - Follow up in 6 weeks for labs at [facility]    THYROID CANCER  HYPOTHYROIDISM  - remote history of thyroidectomy and SHAVER x 2  - No evidence of recurrence on last labs, imaging  - TSH slightly elevated above target range (0.4-2) for thyroid cancer history.  - Recommend small adjustment to levothyroxine dosage: Add 1/2 tablet (62.5 mcg) to current 125 mcg dose on Sundays only to optimize TSH level.    - Ordered thyroid function tests (TSH) in 6 weeks.  - Ordered thyroglobulin tumor marker test due to time elapsed since last check.    - Deferred neck US based on recent imaging and pending thyroglobulin results.    - Informed about potential thyroid hormone dose adjustments with weight loss medications due to changes in absorption and metabolism.  - Contact office if starting Zepbound (tirzepatide) to reassess thyroid hormone dosing more frequently.      OSTEOPOROSIS:  - Discussed osteoporosis treatment transition from Prolia, emphasizing importance of proper discontinuation protocol with either Fosamax or Reclast to prevent rebound fractures.  - Contact office if endocrinologist managing Prolia discontinuation does not plan to transition to alternative osteoporosis medication.    DEXTER:  - working with sleep med clinic for possible zepbound coverage    FOLLOW-UP:  - Follow up in 6 weeks for labs at [facility].             Yisel Bartlett MD    Visit today included increased complexity  associated with the care of the problems addressed and managing the longitudinal care of the patient due to the serious and/or complex managed problems      This note was generated with the assistance of ambient listening technology. Verbal consent was obtained by the patient and accompanying visitor(s) for the recording of patient appointment to facilitate this note. I attest to having reviewed and edited the generated note for accuracy, though some syntax or spelling errors may persist. Please contact the author of this note for any clarification.

## 2025-06-18 NOTE — PATIENT INSTRUCTIONS
The labs show you need more thyroid hormone.  I advise you increase the dose of levothyroxine/Synthroid 125 mcg tablets to a total of 7.5 tablets per week: for example, take 1 tablet daily except take 1.5 tablets on Sunday.  I have included a new prescription for this.      We will repeat a lab in about 6 weeks to make sure the level has returned to the goal range  I put in orders for [facility]

## 2025-06-24 ENCOUNTER — PATIENT MESSAGE (OUTPATIENT)
Dept: PRIMARY CARE CLINIC | Facility: CLINIC | Age: 65
End: 2025-06-24
Payer: COMMERCIAL

## 2025-06-24 DIAGNOSIS — I10 HYPERTENSION, UNSPECIFIED TYPE: Primary | ICD-10-CM

## 2025-06-24 NOTE — TELEPHONE ENCOUNTER
"LOV with Shana aJde MD , 6/10/2025; visit note states   "Hypertension, unspecified type  - Sl elev.  Cont CPAP. Keep BP log and send to MD in 2 wks."    Pt has provided a list of BP readings as requested    "

## 2025-07-01 ENCOUNTER — PATIENT MESSAGE (OUTPATIENT)
Dept: PRIMARY CARE CLINIC | Facility: CLINIC | Age: 65
End: 2025-07-01
Payer: COMMERCIAL

## 2025-07-01 NOTE — TELEPHONE ENCOUNTER
I'm very sorry.  We tried.  I was hopeful given the severity of her DEXTER they would be willing to cover it. Perhaps they will change their stance on this in the future and we can try again at that time.   Dr. ROGERS

## 2025-07-01 NOTE — TELEPHONE ENCOUNTER
" LOV with Shana Jade MD , 6/10/2025  Recent note states"Pt doing well and trying to get adjusted to APAP.  Rec diet and exercise  for wt loss.  Pt is trying to get Zepbound if covered. "    Pt states this medication is not covered by her plan  "

## 2025-07-07 ENCOUNTER — PATIENT MESSAGE (OUTPATIENT)
Dept: PRIMARY CARE CLINIC | Facility: CLINIC | Age: 65
End: 2025-07-07
Payer: COMMERCIAL

## 2025-07-07 RX ORDER — AMLODIPINE BESYLATE 2.5 MG/1
TABLET ORAL
Qty: 90 TABLET | Refills: 0 | Status: SHIPPED | OUTPATIENT
Start: 2025-07-07

## 2025-07-07 NOTE — TELEPHONE ENCOUNTER
Her BP remain high.  I sent a new Rx to add to her current regimen.  She can start taking amlodipine 2.5 mg at night.  She should let me know if hse notices any increased swelling. Set her up for a virtual in 4 wks and we can discuss her BP further. She can continue to keep a log.  Dr. ROGERS

## 2025-07-29 ENCOUNTER — PATIENT MESSAGE (OUTPATIENT)
Dept: PRIMARY CARE CLINIC | Facility: CLINIC | Age: 65
End: 2025-07-29
Payer: COMMERCIAL

## 2025-07-30 NOTE — TELEPHONE ENCOUNTER
LOV with Shana Jade MD , 6/10/2025  Pt would like to take otc magnesium supplementation for numbness in her toes. Read this could help. She is asking is this ok with her other meds and is there a particular formulation she should try  Pt has known peripheral neuropathy and is on Lyrica 50mg BID per chart note

## 2025-07-30 NOTE — TELEPHONE ENCOUNTER
It may not help with numbness. It can help with sleep, and muscle cramps.  I am ok with her trying some magnesium oxide 400 mg daily. She should watch for diarrhea with this and cut back if this occurs.   Dr. ROGERS

## 2025-08-03 NOTE — PROGRESS NOTES
"[facility] Note    Chief Complaint    No chief complaint on file.      History of Present Illness      Bree Gonzales is a 65 y.o.  F with HTN, Hypothyroidism, Depression, Anxiety, FM, OA, and Osteoporosis, Anemia of CKD III, HLD, Vit D def, FM, Peripheral neuropathy, Morbid Obesity, and fam h/o Breast Ca presents to fu chronic issues. Last virtual visit -6/10/25      Notes:   The patient location is: home  The chief complaint leading to consultation is: "fu HTN"     Visit type: audiovisual    Face to Face time with patient: 25 min  25 minutes of total time spent on the encounter, which includes face to face time and non-face to face time preparing to see the patient (eg, review of tests), Obtaining and/or reviewing separately obtained history, Documenting clinical information in the electronic or other health record, Independently interpreting results (not separately reported) and communicating results to the patient/family/caregiver, or Care coordination (not separately reported).         Each patient to whom he or she provides medical services by telemedicine is:  (1) informed of the relationship between the physician and patient and the respective role of any other health care provider with respect to management of the patient; and (2) notified that he or she may decline to receive medical services by telemedicine and may withdraw from such care at any time.    Notes:     HTN - Pt controlled on Toprol 100 mg/d, and Olmesartan 40 mg/d. Rec inc hydration.  Could be related to her claritin.  Offered to add astelin pt declines. Kidney function has decreased. Rec inc hydration. She will send me a copy of her BP log in 2 wks. We held her calcium suppl. and we stopped her  hydrochlorothiazide due to hypercalcemia.    BP remain high. I sent a new Rx to add to her current regimen. She can start taking amlodipine 2.5 mg at night. Inc to 5 mg/d.  No increased swelling. Set her up for a virtual in 4 wks " and we can discuss her BP further. She c/o flushing. Will check serum metanephrine and Renina/kalia and renal a U/s. Consider changing to aldactone 25 mg/d after she gets her labs.    Flushing -  Check Metanephrines. Could be a S.E of her Amlodipine.  Could be related to her uncontrolled HTN or of her DEXTER. Of note - pt also on CPAP for her DEXTER.       Fatty Liver - fibroscan done with Dr. Bansal - results were very good per pt.   FIB-4 Calculation: 1.12 at 4/2/2025 10:38 AM  Calculated from:  SGOT/AST: 19 unit/L at 4/2/2025 10:38 AM  SGPT/ALT: 18 unit/L at 4/2/2025 10:38 AM  Platelets: 251 K/uL at 4/2/2025 10:38 AM  Age: 64 years   FIB-4 below 1.30 is considered as low-risk for advanced fibrosis  FIB-4 over 2.67 is considered as high-risk for advanced fibrosis  FIB-4 values between 1.30 and 2.67 are considered as intermediate-risk of advanced fibrosis for ages 36-64.   For ages > 64 the cut-off for low-risk goes to < 2.    Very severe DEXTER - Snoring - Home sleep study-4/10/25-very severe DEXTER- AHI - 52. She scored 7 on ESS. She is doing well with her CPAP with a nasal mask nightly x 6-8hrs/night.  She feels a little claustrophobic sometimes. She has been sleeping sitting up because she's trying to get used to the mask. Some nights are better than others.  Could consider Zepbound when covered by insurance. We tried recently and it was denied.       Peroneal tendonintis - tx with medrol per Ortho - 9/24/24     5th Metatarsal Fx - MRI Foot - 7/19/23 - oblique fracture of her 5th metatarsal that does not go into the joint space. Fu by Ortho.        H/o Melanoma - Fu by Derm. Doing well. Had resection of lesion to Left cheek 6/17/21.       Hypothyroidism - Pt on Brand Name Synthroid 125 mcg/d. Pt is s/p Total Thyroidectomy due to thyroid Ca. Fu by Dr. Tri Haile.  TSH  target range (0.4-2) for thyroid cancer history.  Pt on synthroid 125 mcg QM-Sat and 1.5 tab on Sundays. Deferred neck US.     Depression - Pt on Venlafaxine  " mg +37.5 mg/d. She recently moved and cares for her elderly Mom. Stable.       Anxiety -  Pt on Venlafaxine  mg +37.5 mg/d. Her mom has dementia. Stable. No change.  "I'm just a worrier. My biggest concern is my son getting  and that's over with". She is expecting a new grand baby in Mar.      H/o Anemia of CKD III - Resolved. Fu by Dr. Sarmiento.        CKD III -  BUN/Cr - 1/1.10 GFR - 56- 5/27/25. Fu by Dr. Sarmiento.  Pt could not afford Farxiga 10 mg/d because insurance would not cover it. Stable.       H/o Hypercalcemia - Calcium is 9.6 down from 11.2.  PTH - 57  - wnl - 1/9/24. We held her calcium suppl. and we stopped her  hydrochlorothiazide due to hypercalcemia.  I had her inc Toprol to 100 mg daily.  Kidney function remains stable. Continue to work on adequate hydration.      HLD - Pt on atorvastatin 40 mg QHS. She c/o flatulence since starting this. She does not feel we need to stop the medication or try an alternate medication.  She will alert me if this changes.  Can take prn Gas-X. Cholesterol looked better in that  LDL (bad cholesterol) improved.Triglycerides were mildly elevated so we inc atorvastatin to 40 mg.     Vit D def - Pt is on a MVI and Ca+D.      OA - Fu by Rheum, Dr. Grimes. MRI Knee pain - 6/29/23. Fu by Ortho. S/p Euflexxa 8/10/23 and 8/24/23.      Osteoporosis - Pt on Prolia. Last inj was in Dec. Knuckles hurt. Can try Voltaren gel. Sched for DEXA in 1 wk.      FM - Pt on Lyrica 25 mg BID.       Peripheral neuropathy - Foot pain - She c/o numbness and throbbing at night. Wearing socks help.  Cymbalta 20 mg/d no help. Pt is on Lyrica 50mg BID.  Vitamin B12 and folic acid levels were both normal. Unclear etiol.  Can refer to Neurology in future for EMG.  Pt defers for now.  Stable.       Morbid Obesity - BMI -47.74 -She has not been exercising as much due to Foot and knee pain.  She has been cutting back on carbs/starches.   Recent normal HA1c. Pt tries to limit carbs. "  Rec goal 150 min/wk.       fam h/o Breast Ca - Pt is BRCA neg. Due for MGM in May      H/o Melanoma - Fu by derm regularly.      H/o Malignant Hyperthermia -s/p anesthesia     Lab review:   6/8/22 - BUN/Cr - 32/1.21 GFR 49  3/17/22 - BUN/Cr - 38/1.19 - GFR - 50;  H/H - 11.2/36; iPTH - 81; Fe - 68; TIBC 490 -elev; %Sat - 14%; Vit D - 52  12/6/21 - BUN/Cr - 33/1.10; H/H - 12/37.4.     HCM - Flu - 9/24/24;  RSV - 9/24/24; Tdap - 6/1/23;  PCV 13 - none;  PVX 23 - 1/25/17;  Shingrix - 8/26/20 and #2 - 11/11/21;  Zostavax - 2/24/15; COVID - 19 Vaccine (Pfiizer)  #1 2/26/21; #2 3/19/21; #3 - 10/26/21; # 4 8/1/22; # 5 6/1/23; MGM -6/2/25- repeat 1 yr;  DEXA - 11/2020 - +Osteoporosis- had recent DA [facility] Dr. Grimes - will obtain copy for review.;  PAP - 5/26/23 - neg. ; Hep C Screen - 12/6/21 - neg. ;  HIV Screen - 12/6/21 - neg. ; C-scope -5/20/25- Polyps, hemorrhoids - repeat 5 yrs per pt;   Prev PCP - Dr. Duckworth;  Rheum - Dr. Grimes; Renal - Dr. Sarmiento; Derm - Dr. Delahoussaye- delgado; Hand Sx - Dr. Quiroz; Ob/GYN - NP - Quiana Sotomayor; Endo - Dr. Vitale; Ophtho - Dr. Kennedy; GI - Dr. Bansal; Well visit - 4/2/25     Patient Care Team:  Shana Jade MD as PCP - General (Internal Medicine)  Praneeth Bansal MD as Consulting Physician (Gastroenterology)  Gatito Grimes Jr., MD (Rheumatology)     Health Maintenance:  Immunization History   Administered Date(s) Administered    COVID-19 Vaccine 09/21/2023    COVID-19, MRNA, LN-S, PF (Pfizer) (Gray Cap) 07/31/2022    COVID-19, MRNA, LN-S, PF (Pfizer) (Purple Cap) 02/26/2021, 03/19/2021, 10/26/2021, 08/01/2022    COVID-19, mRNA, LNP-S, PF (Moderna) Ages 12+ 09/21/2023, 09/24/2024    COVID-19, mRNA, LNP-S, bivalent booster, PF (Moderna Omicron)12 + YEARS 06/01/2023    Hepatitis B (recombinant) Adjuvanted, 2 dose 07/15/2024    Influenza - Quadrivalent 10/03/2019    Influenza - Quadrivalent - MDCK - PF 08/26/2020, 09/13/2022, 09/21/2023    Influenza -  Quadrivalent - PF *Preferred* (6 months and older) 10/02/2013, 10/07/2014, 09/30/2015, 10/20/2016, 10/05/2017, 10/03/2018, 10/12/2021    Influenza - Trivalent - Afluria, Fluzone MDV 10/06/2010, 10/10/2011, 10/02/2013, 10/07/2014, 10/05/2017    Influenza - Trivalent - Fluarix, Flulaval, Fluzone, Afluria - PF 09/30/2015, 10/20/2016, 09/24/2024    Pneumococcal Polysaccharide - 23 Valent 01/25/2017    Rsv, Bivalent, Rsvpref (Abrysvo) 09/24/2024    Tdap 04/30/2014, 05/02/2014, 06/01/2023    Zoster 02/24/2015, 08/26/2020, 11/11/2021    Zoster Recombinant 08/26/2020, 11/11/2021      Health Maintenance   Topic Date Due    Annual UACr  Never done    Pneumococcal Vaccines (Age 50+) (2 of 2 - PCV) 01/25/2018    COVID-19 Vaccine (8 - 2024-25 season) 11/19/2024    DEXA Scan  06/13/2025    Influenza Vaccine (1) 09/01/2025    Mammogram  06/02/2026    Hemoglobin A1c (Diabetic Prevention Screening)  06/06/2027    Colorectal Cancer Screening  05/20/2028    Cervical Cancer Screening  05/26/2028    Lipid Panel  04/02/2030    TETANUS VACCINE  06/01/2033    Hepatitis C Screening  Completed    Shingles Vaccine  Completed    HIV Screening  Completed    RSV Vaccine (Age 60+ and Pregnant patients)  Completed        Past Medical History:  Past Medical History:   Diagnosis Date    BRCA1 negative     BRCA2 negative     CKD (chronic kidney disease), stage III 10/12/2021    Family history of breast cancer     Fibromyalgia     Hypertension     Hypothyroidism     Malignant hyperthermia due to anesthesia     Malignant melanoma of skin, unspecified     Osteoporosis     Skin cancer 10/12/2021    Thyroid cancer 10/12/2021       Past Surgical History:   has a past surgical history that includes Cholecystectomy (05/1981); Total thyroidectomy (03/2002); Skin cancer excision; Dilation and curettage of uterus; Trigger finger release (Right, 11/2020); Cataract extraction, bilateral; and Eye surgery (4/2017).    Family History:  family history includes  Arthritis in her mother; Breast cancer in her maternal grandmother; Breast cancer (age of onset: 51) in her sister; Breast cancer (age of onset: 74) in her mother; Cancer in her mother and sister; Dementia in her mother; Hearing loss in her father; Melanoma in her father; Mental illness in her mother.     Social History:  Social History[1]    Review of Systems     Medications:  Current Medications[2]     Allergies:  Review of patient's allergies indicates:   Allergen Reactions    Nsaids (non-steroidal anti-inflammatory drug)     Neosporin (neomycin-polymyx) Rash       Physical Exam                    Physical Exam  Constitutional:       General: She is not in acute distress.     Appearance: Normal appearance. She is obese. She is not ill-appearing, toxic-appearing or diaphoretic.   HENT:      Head: Normocephalic and atraumatic.   Pulmonary:      Effort: No respiratory distress.   Neurological:      General: No focal deficit present.      Mental Status: She is alert and oriented to person, place, and time.   Psychiatric:         Mood and Affect: Mood normal.         Behavior: Behavior normal.          Laboratory:  CBC:  Recent Labs   Lab 11/14/22  1034 06/06/24  0922 04/02/25  1038   WBC 7.52 8.0 7.63   RBC 4.33 4.32 4.24   Hemoglobin 12.4 13.3  --    HGB  --   --  13.1   Hematocrit 38.7 40.9  --    HCT  --   --  41.5   Platelet Count  --   --  251   Platelets 275 285  --    MCV 89 94.7 98   MCH 28.6 30.8 30.9   MCHC 32.0 32.5 31.6 L       CMP:  Recent Labs   Lab 09/30/24  1123 04/02/25  1038 05/27/25  0939   Glucose 76 96 98   Calcium 9.8 9.7 9.6   Albumin 3.7 3.6  --    Albumin Level  --   --  4.2   Protein Total  --  6.8  --    Total Protein 6.5  --   --    Sodium 138 137 140   Potassium 4.2 3.9 4.0   CO2 32 H 30 H  --    Carbon Dioxide  --   --  30   Chloride 99 102  --    BUN 44 H 26 H  --    Blood Urea Nitrogen  --   --  21   Creatinine 1.3 1.1 1.10 H   Alkaline Phosphatase 87  --  99   ALP  --  85  --    ALT  19 18 15   AST 15 19 12   Total Bilirubin 0.5  --  0.6   Bilirubin Total  --  0.5  --             LIPIDS:  Recent Labs   Lab 06/01/23  0854 09/11/23  1255 06/06/24  0922 06/24/24  0902 09/30/24  1123 04/02/25  1038 06/02/25  1201 07/29/25  0906   TSH 0.04 L   < >  --    < > 0.431  --  3.631 0.93   HDL 64  --  59  --   --   --   --   --    HDL Cholesterol  --   --   --   --   --  47  --   --    Cholesterol Total  --   --   --   --   --  180  --   --    Cholesterol 181  --  203 H  --   --   --   --   --    Triglycerides 199 H  --  189 H  --   --   --   --   --    Triglyceride  --   --   --   --   --  237 H  --   --    LDL Cholesterol 87  --  114 H  --   --  85.6  --   --    HDL/Cholesterol Ratio 2.8  --  3.4  --   --  26.1  --   --    Non HDL Cholesterol  --   --   --   --   --  133  --   --    Non HDL Chol. (LDL+VLDL) 117  --  144 H  --   --   --   --   --    Cholesterol/HDL Ratio  --   --   --   --   --  3.8  --   --     < > = values in this interval not displayed.       TSH:  Recent Labs   Lab 09/30/24  1123 06/02/25  1201 07/29/25  0906   TSH 0.431 3.631 0.93       A1C:  Recent Labs   Lab 06/01/23  0854 06/06/24 0922   Hemoglobin A1C 4.9 4.9          Other:   Recent Labs   Lab 09/11/23  1255   Vitamin B-12 717           Assessment/Plan     Bree Gonzales is a 65 y.o.female with:    Hypertension, unspecified type  -     US Renal Artery Stenosis Hyperten (xpd); Future; Expected date: 08/05/2025  -     semaglutide, weight loss, (WEGOVY) 0.25 mg/0.5 mL PnIj; Inject 0.25 mg into the skin every 7 days.  Dispense: 2 mL; Refill: 0  -     Aldosterone; Future; Expected date: 08/05/2025  - Will temp inc amlodipine from 2.5 mg to 5 mg/d.  Will likely change to aldactone after labs.  Check Kalia, kalia/renin, Metanephrines, and Renal a u/s. Will see if wegovy covered.     Flushing  -     Aldosterone/Renin Activity Ratio; Future; Expected date: 08/05/2025  -     Metanephrines, Plasma Free; Future; Expected date: 08/05/2025  -  Check Metanephrines. Could be a S.E of her Amlodipine.  Could be related to her uncontrolled HTN or of her DEXTER. Of note - pt also on CPAP for her DEXTER.     Severe obstructive sleep apnea  - Cont CPAP.  Cont to work on diet and exercise as discussed for wt loss.      Hyperlipidemia, unspecified hyperlipidemia type  -     semaglutide, weight loss, (WEGOVY) 0.25 mg/0.5 mL PnIj; Inject 0.25 mg into the skin every 7 days.  Dispense: 2 mL; Refill: 0  - Stable.  Cont current regimen. Will see if wegovy covered.     Stage 3a chronic kidney disease  -     semaglutide, weight loss, (WEGOVY) 0.25 mg/0.5 mL PnIj; Inject 0.25 mg into the skin every 7 days.  Dispense: 2 mL; Refill: 0  - Cont to avoid NSAIDs. Rec adeq hydration. Fu by Renal.  Cont to work on BP control.        Chronic conditions status updated as per HPI.  Other than changes above, cont current medications and maintain follow up with specialists.   Follow up in about 6 weeks (around 9/16/2025).      Shana Jade MD  [facility]                  Answers submitted by the patient for this visit:  High Blood Pressure Questionnaire (Submitted on 7/29/2025)  Chief Complaint: Hypertension  Chronicity: chronic  Onset: more than 1 year ago  Progression since onset: waxing and waning  Condition status: resistant  Agents associated with hypertension: NSAIDs, thyroid hormones  CAD risks: dyslipidemia, obesity, post-menopausal state, sedentary lifestyle  Compliance problems: exercise  Improvement on treatment: mild         [1]   Social History  Tobacco Use    Smoking status: Never     Passive exposure: Never    Smokeless tobacco: Never   Substance Use Topics    Alcohol use: Never    Drug use: Never   [2]   Current Outpatient Medications:     amLODIPine (NORVASC) 2.5 MG tablet, 1 po QHS, Disp: 90 tablet, Rfl: 0    atorvastatin (LIPITOR) 40 MG tablet, Take 1 tablet (40 mg total) by mouth every evening., Disp: 90 tablet, Rfl: 1    cetirizine 10 mg Cap, Take by  mouth., Disp: , Rfl:     denosumab (PROLIA) 60 mg/mL Syrg, Inject 1 mL (60 mg total) into the skin every 6 (six) months., Disp: , Rfl:     levothyroxine (SYNTHROID) 125 MCG tablet, Take 1 tablet (125 mcg total) by mouth before breakfast. Take 1 tab 6 days a week and 1.5 tabs on Sundays for total of 7.5 tabs weekly, Disp: 96 tablet, Rfl: 3    metoprolol succinate (TOPROL-XL) 100 MG 24 hr tablet, Take 1 tablet (100 mg total) by mouth once daily., Disp: 90 tablet, Rfl: 3    olmesartan (BENICAR) 40 MG tablet, TAKE 1 TABLET BY MOUTH EVERY DAY, Disp: 90 tablet, Rfl: 3    pregabalin (LYRICA) 50 MG capsule, Take 50 mg by mouth 2 (two) times a day., Disp: , Rfl:     semaglutide, weight loss, (WEGOVY) 0.25 mg/0.5 mL PnIj, Inject 0.25 mg into the skin every 7 days., Disp: 2 mL, Rfl: 0    traMADoL (ULTRAM) 50 mg tablet, Take 1 tablet (50 mg total) by mouth every 8 (eight) hours as needed for Pain., Disp: 20 tablet, Rfl: 0    venlafaxine (EFFEXOR-XR) 150 MG Cp24, TAKE 1 CAPSULE (150 MG TOTAL) BY MOUTH EVERY EVENING. WITH 37.5MG CAPSULE. TOTAL DAILY DOSE 187.5MG, Disp: 90 capsule, Rfl: 2    venlafaxine (EFFEXOR-XR) 37.5 MG 24 hr capsule, TAKE 1 CAPSULE BY MOUTH EVERY DAY WITH 150 MG FOR A TOTAL DAILY DOSE .5, Disp: 90 capsule, Rfl: 2

## 2025-08-05 ENCOUNTER — OFFICE VISIT (OUTPATIENT)
Dept: PRIMARY CARE CLINIC | Facility: CLINIC | Age: 65
End: 2025-08-05
Payer: COMMERCIAL

## 2025-08-05 ENCOUNTER — PATIENT MESSAGE (OUTPATIENT)
Dept: PRIMARY CARE CLINIC | Facility: CLINIC | Age: 65
End: 2025-08-05

## 2025-08-05 DIAGNOSIS — G47.33 SEVERE OBSTRUCTIVE SLEEP APNEA: ICD-10-CM

## 2025-08-05 DIAGNOSIS — R23.2 FLUSHING: ICD-10-CM

## 2025-08-05 DIAGNOSIS — I10 HYPERTENSION, UNSPECIFIED TYPE: Primary | ICD-10-CM

## 2025-08-05 DIAGNOSIS — N18.31 STAGE 3A CHRONIC KIDNEY DISEASE: ICD-10-CM

## 2025-08-05 DIAGNOSIS — E78.5 HYPERLIPIDEMIA, UNSPECIFIED HYPERLIPIDEMIA TYPE: ICD-10-CM

## 2025-08-05 PROCEDURE — 1160F RVW MEDS BY RX/DR IN RCRD: CPT | Mod: CPTII,95,, | Performed by: INTERNAL MEDICINE

## 2025-08-05 PROCEDURE — 4010F ACE/ARB THERAPY RXD/TAKEN: CPT | Mod: CPTII,95,, | Performed by: INTERNAL MEDICINE

## 2025-08-05 PROCEDURE — 1159F MED LIST DOCD IN RCRD: CPT | Mod: CPTII,95,, | Performed by: INTERNAL MEDICINE

## 2025-08-05 PROCEDURE — 98006 SYNCH AUDIO-VIDEO EST MOD 30: CPT | Mod: 95,,, | Performed by: INTERNAL MEDICINE

## 2025-08-05 RX ORDER — SEMAGLUTIDE 0.25 MG/.5ML
0.25 INJECTION, SOLUTION SUBCUTANEOUS
Qty: 2 ML | Refills: 0 | Status: SHIPPED | OUTPATIENT
Start: 2025-08-05

## 2025-08-06 ENCOUNTER — LAB VISIT (OUTPATIENT)
Dept: LAB | Facility: HOSPITAL | Age: 65
End: 2025-08-06
Attending: INTERNAL MEDICINE
Payer: COMMERCIAL

## 2025-08-06 ENCOUNTER — PATIENT MESSAGE (OUTPATIENT)
Dept: PRIMARY CARE CLINIC | Facility: CLINIC | Age: 65
End: 2025-08-06
Payer: COMMERCIAL

## 2025-08-06 DIAGNOSIS — R23.2 FLUSHING: ICD-10-CM

## 2025-08-06 DIAGNOSIS — I10 HYPERTENSION, UNSPECIFIED TYPE: ICD-10-CM

## 2025-08-06 PROCEDURE — 82088 ASSAY OF ALDOSTERONE: CPT | Mod: 59

## 2025-08-06 PROCEDURE — 83835 ASSAY OF METANEPHRINES: CPT

## 2025-08-06 PROCEDURE — 82088 ASSAY OF ALDOSTERONE: CPT

## 2025-08-06 PROCEDURE — 36415 COLL VENOUS BLD VENIPUNCTURE: CPT

## 2025-08-07 ENCOUNTER — HOSPITAL ENCOUNTER (OUTPATIENT)
Dept: RADIOLOGY | Facility: HOSPITAL | Age: 65
Discharge: HOME | End: 2025-08-07
Attending: INTERNAL MEDICINE
Payer: COMMERCIAL

## 2025-08-07 DIAGNOSIS — I10 HYPERTENSION, UNSPECIFIED TYPE: ICD-10-CM

## 2025-08-07 PROCEDURE — 76770 US EXAM ABDO BACK WALL COMP: CPT | Mod: TC

## 2025-08-09 LAB
ALDOST SERPL-MCNC: 10.4 NG/DL
ALDOST/RENIN PLAS-RTO: 6.9 RATIO
RENIN PLAS-CCNC: 1.5 NG/ML/HR

## 2025-08-11 LAB — W ALDOSTERONE: 10.1 NG/DL

## 2025-08-14 LAB
W METANEPHRINE, FREE: 39 PG/ML
W NORMETANEPHRINE, FREE: 287 PG/ML
W TOTAL, FREE (MN + NMN): 326 PG/ML

## 2025-08-17 ENCOUNTER — PATIENT MESSAGE (OUTPATIENT)
Dept: PRIMARY CARE CLINIC | Facility: CLINIC | Age: 65
End: 2025-08-17
Payer: COMMERCIAL

## 2025-08-17 DIAGNOSIS — I10 HYPERTENSION, UNSPECIFIED TYPE: ICD-10-CM

## 2025-08-18 RX ORDER — AMLODIPINE BESYLATE 5 MG/1
TABLET ORAL
Qty: 90 TABLET | Refills: 0 | Status: SHIPPED | OUTPATIENT
Start: 2025-08-18